# Patient Record
Sex: MALE | Race: WHITE | NOT HISPANIC OR LATINO | Employment: FULL TIME | URBAN - METROPOLITAN AREA
[De-identification: names, ages, dates, MRNs, and addresses within clinical notes are randomized per-mention and may not be internally consistent; named-entity substitution may affect disease eponyms.]

---

## 2017-11-18 ENCOUNTER — TRANSFERRED RECORDS (OUTPATIENT)
Dept: HEALTH INFORMATION MANAGEMENT | Facility: CLINIC | Age: 32
End: 2017-11-18

## 2019-10-02 ENCOUNTER — HEALTH MAINTENANCE LETTER (OUTPATIENT)
Age: 34
End: 2019-10-02

## 2021-01-15 ENCOUNTER — HEALTH MAINTENANCE LETTER (OUTPATIENT)
Age: 36
End: 2021-01-15

## 2021-07-07 ENCOUNTER — COMMUNICATION - HEALTHEAST (OUTPATIENT)
Dept: SCHEDULING | Facility: CLINIC | Age: 36
End: 2021-07-07

## 2021-07-11 PROBLEM — I16.0 HYPERTENSIVE URGENCY: Status: ACTIVE | Noted: 2021-07-06

## 2021-07-11 PROBLEM — I48.0 PAROXYSMAL A-FIB (H): Status: ACTIVE | Noted: 2021-07-08

## 2021-07-11 PROBLEM — I50.21 ACUTE SYSTOLIC CONGESTIVE HEART FAILURE (H): Status: ACTIVE | Noted: 2021-07-08

## 2021-07-11 PROBLEM — G47.33 OSA (OBSTRUCTIVE SLEEP APNEA): Status: ACTIVE | Noted: 2021-07-06

## 2021-07-11 PROBLEM — J81.0 ACUTE PULMONARY EDEMA (H): Status: ACTIVE | Noted: 2021-07-06

## 2021-07-11 PROBLEM — J96.01 ACUTE RESPIRATORY FAILURE WITH HYPOXIA (H): Status: ACTIVE | Noted: 2021-07-06

## 2021-07-13 ENCOUNTER — OFFICE VISIT (OUTPATIENT)
Dept: FAMILY MEDICINE | Facility: CLINIC | Age: 36
End: 2021-07-13
Payer: COMMERCIAL

## 2021-07-13 VITALS
WEIGHT: 298.6 LBS | OXYGEN SATURATION: 99 % | BODY MASS INDEX: 40.5 KG/M2 | SYSTOLIC BLOOD PRESSURE: 140 MMHG | HEART RATE: 79 BPM | DIASTOLIC BLOOD PRESSURE: 102 MMHG

## 2021-07-13 DIAGNOSIS — I10 BENIGN ESSENTIAL HYPERTENSION: ICD-10-CM

## 2021-07-13 DIAGNOSIS — G47.33 OSA (OBSTRUCTIVE SLEEP APNEA): ICD-10-CM

## 2021-07-13 DIAGNOSIS — E66.01 MORBID OBESITY (H): ICD-10-CM

## 2021-07-13 DIAGNOSIS — J81.0 ACUTE PULMONARY EDEMA (H): Primary | ICD-10-CM

## 2021-07-13 DIAGNOSIS — I50.21 ACUTE SYSTOLIC CONGESTIVE HEART FAILURE (H): ICD-10-CM

## 2021-07-13 PROBLEM — J96.01 ACUTE RESPIRATORY FAILURE WITH HYPOXIA (H): Status: RESOLVED | Noted: 2021-07-06 | Resolved: 2021-07-13

## 2021-07-13 PROBLEM — I16.0 HYPERTENSIVE URGENCY: Status: RESOLVED | Noted: 2021-07-06 | Resolved: 2021-07-13

## 2021-07-13 LAB
ANION GAP SERPL CALCULATED.3IONS-SCNC: 13 MMOL/L (ref 5–18)
BUN SERPL-MCNC: 23 MG/DL (ref 8–22)
CALCIUM SERPL-MCNC: 9.7 MG/DL (ref 8.5–10.5)
CHLORIDE BLD-SCNC: 103 MMOL/L (ref 98–107)
CO2 SERPL-SCNC: 22 MMOL/L (ref 22–31)
CREAT SERPL-MCNC: 1.14 MG/DL (ref 0.7–1.3)
GFR SERPL CREATININE-BSD FRML MDRD: 82 ML/MIN/1.73M2
GLUCOSE BLD-MCNC: 102 MG/DL (ref 70–125)
POTASSIUM BLD-SCNC: 4.4 MMOL/L (ref 3.5–5)
SODIUM SERPL-SCNC: 138 MMOL/L (ref 136–145)

## 2021-07-13 PROCEDURE — 99495 TRANSJ CARE MGMT MOD F2F 14D: CPT | Performed by: FAMILY MEDICINE

## 2021-07-13 PROCEDURE — 80048 BASIC METABOLIC PNL TOTAL CA: CPT | Performed by: FAMILY MEDICINE

## 2021-07-13 PROCEDURE — 36415 COLL VENOUS BLD VENIPUNCTURE: CPT | Performed by: FAMILY MEDICINE

## 2021-07-15 NOTE — PROGRESS NOTES
Hospital Follow-up Visit:    Hospital/Nursing Home/IP Rehab Facility: [facility]  Date of Admission: 7/6/2021  Date of Discharge: 7/8/2021  Reason(s) for Admission: pulmonary edema      Was your hospitalization related to COVID-19? No   Problems taking medications regularly:  None  Medication changes since discharge: None  Problems adhering to non-medication therapy:  None    Summary of hospitalization:  [facility] discharge summary reviewed  Diagnostic Tests/Treatments reviewed.  Follow up needed: none  Other Healthcare Providers Involved in Patient s Care:         None  Update since discharge: improved. Post Discharge Medication Reconciliation: discharge medications reconciled, continue medications without change.  Plan of care communicated with patient         Assessment & Plan     Acute pulmonary edema (H)    This is essentially resolved at this point.  He is breathing very normally and I hear no evidence of fluid in his lungs.  We will continue to observe him for now.  He continues on the furosemide 20 mg just as needed.  And is not really requiring any of that right now.    Acute systolic congestive heart failure (H)    I did do basic profile today.  He will continue the carvedilol at 37.5 mg twice a day as well as the losartan that he is using for this purpose and he can follow-up with a cardiologist as scheduled here soon.      - Basic metabolic panel    Benign essential hypertension    Again as above, with the medications that will also help with his blood pressure.  It is pretty high today even on recheck so we will need to keep an eye on that and the cardiology visit in the near future will also no doubt that take look at that number as well.    LEE ANN (obstructive sleep apnea)      Morbid obesity (H)    We briefly talked about weight today and has been struggling with that much of his adult life.  We talked briefly about trying to eat right with lower carbohydrate  intake and decreased fat and salt intake, as well as trying to exercise at some point every day if he can.      Review of external notes as documented elsewhere in note  Review of the result(s) of each unique test - labs  Ordering of each unique test  Prescription drug management  45 minutes spent on the date of the encounter doing chart review, review of outside records, review of test results, interpretation of tests, patient visit and documentation        BMI:   Estimated body mass index is 40.5 kg/m  as calculated from the following:    Height as of 7/6/21: 1.829 m (6').    Weight as of this encounter: 135.4 kg (298 lb 9.6 oz).   Weight management plan: Discussed healthy diet and exercise guidelines      Nathanael Boswell MD  [facility]    Gomez Simon is a 36 year old who presents for the following health issues     HPI     Patient is here today as a new patient who is following up from recent hospitalization.  He was seen in the hospital from July 6 to July 8 with pulmonary edema and heart failure.  He is obviously rather young for this but has had some issues with this in the past.  He had some atrial fibrillation before but this time he was not in atrial fibrillation but had an EF that was done around 30% and this affected his breathing and he went into the hospital and was found to be in heart failure and was diuresed and had his medications adjusted and he went home a couple of days later.  Is feeling much better at this point.  He is not really having any shortness of breath or chest pain or any trouble like that.  He is following up with our cardiology team here in the next couple of weeks.  He is here to get some blood checked today as well.    Patient is new patient to the clinic. Please see past medical history, surgical history, social history and family history, all of which were completed in their entirety today.   Review of Systems   Constitutional, HEENT,  cardiovascular, pulmonary, gi and gu systems are negative, except as otherwise noted.      Objective    BP (!) 140/102 (BP Location: Left arm, Patient Position: Sitting, Cuff Size: Adult Large)   Pulse 79   Wt 135.4 kg (298 lb 9.6 oz)   SpO2 99%   BMI 40.50 kg/m    Body mass index is 40.5 kg/m .  Physical Exam     Obese gentleman in no acute distress.  Vital signs as noted.  Blood pressure is still elevated.  Chest clear to auscultation today.  Heart regular rate and rhythm.  Extremities do not show any significant edema.    Results for orders placed or performed in visit on 07/13/21   Basic metabolic panel     Status: Abnormal   Result Value Ref Range    Sodium 138 136 - 145 mmol/L    Potassium 4.4 3.5 - 5.0 mmol/L    Chloride 103 98 - 107 mmol/L    Carbon Dioxide (CO2) 22 22 - 31 mmol/L    Anion Gap 13 5 - 18 mmol/L    Urea Nitrogen 23 (H) 8 - 22 mg/dL    Creatinine 1.14 0.70 - 1.30 mg/dL    Calcium 9.7 8.5 - 10.5 mg/dL    Glucose 102 70 - 125 mg/dL    GFR Estimate 82 >60 mL/min/1.73m2

## 2021-08-24 ENCOUNTER — OFFICE VISIT (OUTPATIENT)
Dept: CARDIOLOGY | Facility: CLINIC | Age: 36
End: 2021-08-24
Payer: COMMERCIAL

## 2021-08-24 VITALS
SYSTOLIC BLOOD PRESSURE: 150 MMHG | RESPIRATION RATE: 16 BRPM | BODY MASS INDEX: 41.09 KG/M2 | WEIGHT: 303 LBS | HEART RATE: 78 BPM | DIASTOLIC BLOOD PRESSURE: 102 MMHG

## 2021-08-24 DIAGNOSIS — I42.0 DILATED CARDIOMYOPATHY (H): ICD-10-CM

## 2021-08-24 DIAGNOSIS — I10 HYPERTENSION, UNCONTROLLED: Primary | ICD-10-CM

## 2021-08-24 DIAGNOSIS — I48.0 PAROXYSMAL A-FIB (H): ICD-10-CM

## 2021-08-24 DIAGNOSIS — G47.33 OSA (OBSTRUCTIVE SLEEP APNEA): ICD-10-CM

## 2021-08-24 DIAGNOSIS — I50.21 ACUTE SYSTOLIC CONGESTIVE HEART FAILURE (H): ICD-10-CM

## 2021-08-24 DIAGNOSIS — E66.01 MORBID OBESITY (H): ICD-10-CM

## 2021-08-24 PROCEDURE — 99204 OFFICE O/P NEW MOD 45 MIN: CPT | Performed by: INTERNAL MEDICINE

## 2021-08-24 RX ORDER — VALSARTAN 320 MG/1
320 TABLET ORAL DAILY
Qty: 10 TABLET | Refills: 1 | Status: SHIPPED | OUTPATIENT
Start: 2021-08-24 | End: 2021-09-14

## 2021-08-24 NOTE — PROGRESS NOTES
[facility] Heart Care Office Consult     Assessment:   (I10) Hypertension, uncontrolled  (primary encounter diagnosis)  Comment: Suspect his biggest issue, possibly from medication noncompliance.  Will check renal artery ultrasound since he has a history of renal dysfunction following ACE inhibitor administration.  If needed can do renal intervention.  We will switch losartan over to valsartan 320 mg for better efficacy.  If blood pressure not under better control would switch Toprol back over to carvedilol 37.5 mg twice a day.  Might need to add Aldactone 25 mg twice a day.    (G47.33) LEE ANN (obstructive sleep apnea)  Comment: Nightly CPAP.  Current weight loss.      (E66.01) Morbid obesity (H)  Comment: As above work on weight loss.      (I42.0) Dilated cardiomyopathy (H)  Comment: In past ejection fraction 25% with atrial fibrillation, most recently calculated 49 but estimated 40% with us, prior to that at [facility] was 60 to 65%.  Once blood pressure stabilizes we will arrange for repeat echo.    (I50.21) Acute systolic congestive heart failure (H)  Comment: No significant signs or symptoms currently but suggest furosemide as needed.  Might consider adding Aldactone in future.    (I48.0) Paroxysmal A-fib (H)  Comment: In sinus rhythm currently, had cardioversion some years ago, [id number] is at least 2, chronic Xarelto therapy.     Plan:   1.  Change losartan over to valsartan, 10-day prescription given, if he tolerates it we will get him a 90-day prescription.  2.  Renal artery ultrasound looking for renal artery stenosis and address if found.  3.  Consider the Raptor study for blood pressure management.  4.  Consider changing Toprol over to carvedilol.  5.  Follow-up with me in 3 months or sooner if needed.    History of Present Illness:   Thank you for asking the [facility] Heart Care team to see Alfred MOORE Abraham a 36 year old   male  in consultation  to evaluate heart failure and  cardiomyopathy.   Several years ago patient had a foot injury while at work and was noted have a mild cardiomyopathy with ejection fraction of 45 to 50%.  This was in 2009 and since then he is been hospitalized at least once around 2013 with atrial fibrillation and ejection fraction of 25%.  His ejection fraction was around 45 to 50% in around 2016 when he underwent coronary angiography and showing no structural lesions.  Just recently, he started CPAP, and has been feeling so well he discontinued his antihypertensive therapy.  He presented to the hospital on heart failure with pulmonary edema without any extremity edema.  He was appropriately diuresed and is here today to establish relationship with a cardiologist since he is now moved to [place].  He currently is getting along well. Patient complains of no syncope, dizziness, fatigue, fevers, chest pain, palpitations, shortness of breath, PND, orthopnea, nausea, vomiting, or edema.    Past Medical History:     Past Medical History:   Diagnosis Date     Acute respiratory failure with hypoxia (H) 7/6/2021     Atrial fibrillation (H) acceptable not valvular      Congestive heart failure (H) in the setting of mid range ejection fraction      Hypertension      Hypertensive urgency  Struct of sleep apnea  History of acute kidney injury 7/6/2021     Past history is negative for cancer, tuberculosis, diabetes mellitus, myocardial infarction,  rheumatic fever, cerebrovascular accident, chronic kidney disease, peptic ulcer disease, chronic obstructive pulmonary disease, or thyroid disorder  and no lipid disorder.    Past Surgical History:     Past Surgical History:   Procedure Laterality Date     ORTHOPEDIC SURGERY  2011    L foot metatarsal     Family History:   Family history positive hypertension in his father otherwise negative for coronary artery disease.    Social History:   He is , lives at home independently with his wife and 2 daughters, works as a   for [employer], reports that he has never smoked. He has never used smokeless tobacco. He reports previous alcohol use. He reports that he does not use drugs. The primary care physician is Nathanael Boswell    Meds:   Scheduled Meds:  Current Outpatient Medications   Medication Sig Dispense Refill     amoxicillin-clavulanate (AUGMENTIN) 875-125 MG per tablet Take 1 tablet by mouth daily       aspirin 81 mg chewable tablet [ASPIRIN 81 MG CHEWABLE TABLET] Chew 81 mg daily.       docosahexaenoic acid-epa 120-180 mg cap [DOCOSAHEXAENOIC ACID--180 MG CAP] Take 2 g by mouth daily.       furosemide (LASIX) 20 MG tablet [FUROSEMIDE (LASIX) 20 MG TABLET] Take 1 tablet (20 mg total) by mouth daily as needed (for shortness of breath or weight gain of 2 lbs or greater). 30 tablet 0     losartan (COZAAR) 100 MG tablet [LOSARTAN (COZAAR) 100 MG TABLET] Take 1 tablet (100 mg total) by mouth daily. 30 tablet 0     LOSARTAN POTASSIUM PO Take 50 mg by mouth daily       METOPROLOL TARTRATE PO Take 100 mg by mouth daily       multivitamin (MEN'S MULTI-VITAMIN) per tablet [MULTIVITAMIN (MEN'S MULTI-VITAMIN) PER TABLET] Take 1 tablet by mouth daily.       Rivaroxaban (XARELTO PO) Take 20 mg by mouth daily       rivaroxaban ANTICOAGULANT (XARELTO) 20 mg tablet [RIVAROXABAN ANTICOAGULANT (XARELTO) 20 MG TABLET] Take 1 tablet (20 mg total) by mouth daily. 30 tablet 0     valsartan (DIOVAN) 320 MG tablet Take 1 tablet (320 mg) by mouth daily 10 tablet 1     carvediloL (COREG) 25 MG tablet [CARVEDILOL (COREG) 25 MG TABLET] Take 1.5 tablets (37.5 mg total) by mouth 2 (two) times a day with meals. 90 tablet 0       PRN Meds:.    Allergies:   Patient has no known allergies.    Objective:      Physical Exam  137.4 kg (303 lb)     Body mass index is 41.09 kg/m .  BP (!) 150/102 (BP Location: Left arm, Patient Position: Sitting, Cuff Size: Adult Large)   Pulse 78   Resp 16   Wt 137.4 kg (303 lb)   BMI 41.09 kg/m      General Appearance:    Alert, cooperative and in no acute distress.   HEENT:  No scleral icterus; the mucous membranes were pink and moist.   Neck: JVP normal. No thyromegaly. No HJR   Chest: The spine was straight. The chest was symmetric.   Lungs:   Respirations unlabored; the lungs are clear to auscultation.   Cardiovascular:   S1 and S2 without murmur, clicks or rubs. Brachial, radial, carotid and posterior tibial pulses are intact and symetrical.  No carotid bruits noted   Abdomen:  No organomegaly, masses, bruits, or tenderness. Bowels sounds are present   Extremities: No cyanosis, clubbing, or edema.   Skin: No xanthelasma.   Neurologic: Mood and affect are appropriate.         Lab Reviewed Personally by myself  Lab Results   Component Value Date     07/13/2021     03/27/2012    CO2 22 07/13/2021    CO2 29 03/27/2012    BUN 23 07/13/2021    BUN 18.9 03/27/2012    BUN 19 03/27/2012     Lab Results   Component Value Date    WBC 11.7 07/07/2021    WBC 10.8 02/09/2011    HGB 15.6 07/07/2021    HGB 13.5 02/09/2011    HCT 45.4 07/07/2021    HCT 39.7 02/09/2011    MCV 86 07/07/2021    MCV 93 02/09/2011     07/07/2021     02/09/2011     Lab Results   Component Value Date    CHOL 191 02/09/2011    TRIG 162 03/27/2012    HDL 43 03/27/2012     Lab Results   Component Value Date     07/06/2021       ECG personally reviewed by myself shows this rhythm, leftward axis, left ventricular hypertrophy with repolarization changes.     Review of Systems:     Review of Systems:   Enc Vitals  BP: (!) 150/102  Pulse: 78  Resp: 16  Weight: 137.4 kg (303 lb)

## 2021-08-24 NOTE — PATIENT INSTRUCTIONS
Alfred OSCAR Rosario,  It certainly was nice to meet you today.  Per our conversation you having high blood pressures which leads to heart failure and abnormal heartbeat and the reason I am checking the ultrasound of the kidney arteries heart.  Try the VALSARTAN in place of the LOSARTAN and if no issues call [phone] to get 90 day supply.  We will call you the results of these tests.   I will plan on seeing you in 1-2 months or sooner if need be.  Harinder Fisher

## 2021-08-24 NOTE — LETTER
8/24/2021    Nathanael Boswell MD  [address] [facility] [address]    RE: Alfred Rosario       Dear Colleague,    I had the pleasure of seeing Alfred S Abraham in the [facility] Heart Care.      [facility] Heart Care Office Consult     Assessment:   (I10) Hypertension, uncontrolled  (primary encounter diagnosis)  Comment: Suspect his biggest issue, possibly from medication noncompliance.  Will check renal artery ultrasound since he has a history of renal dysfunction following ACE inhibitor administration.  If needed can do renal intervention.  We will switch losartan over to valsartan 320 mg for better efficacy.  If blood pressure not under better control would switch Toprol back over to carvedilol 37.5 mg twice a day.  Might need to add Aldactone 25 mg twice a day.    (G47.33) LEE ANN (obstructive sleep apnea)  Comment: Nightly CPAP.  Current weight loss.      (E66.01) Morbid obesity (H)  Comment: As above work on weight loss.      (I42.0) Dilated cardiomyopathy (H)  Comment: In past ejection fraction 25% with atrial fibrillation, most recently calculated 49 but estimated 40% with us, prior to that at [facility] was 60 to 65%.  Once blood pressure stabilizes we will arrange for repeat echo.    (I50.21) Acute systolic congestive heart failure (H)  Comment: No significant signs or symptoms currently but suggest furosemide as needed.  Might consider adding Aldactone in future.    (I48.0) Paroxysmal A-fib (H)  Comment: In sinus rhythm currently, had cardioversion some years ago, [id number] is at least 2, chronic Xarelto therapy.     Plan:   1.  Change losartan over to valsartan, 10-day prescription given, if he tolerates it we will get him a 90-day prescription.  2.  Renal artery ultrasound looking for renal artery stenosis and address if found.  3.  Consider the Raptor study for blood pressure management.  4.  Consider changing Toprol over to  carvedilol.  5.  Follow-up with me in 3 months or sooner if needed.    History of Present Illness:   Thank you for asking the [facility] Heart Care team to see Alfred Rosario a 36 year old   male  in consultation  to evaluate heart failure and cardiomyopathy.   Several years ago patient had a foot injury while at work and was noted have a mild cardiomyopathy with ejection fraction of 45 to 50%.  This was in 2009 and since then he is been hospitalized at least once around 2013 with atrial fibrillation and ejection fraction of 25%.  His ejection fraction was around 45 to 50% in around 2016 when he underwent coronary angiography and showing no structural lesions.  Just recently, he started CPAP, and has been feeling so well he discontinued his antihypertensive therapy.  He presented to the hospital on heart failure with pulmonary edema without any extremity edema.  He was appropriately diuresed and is here today to establish relationship with a cardiologist since he is now moved to [place].  He currently is getting along well. Patient complains of no syncope, dizziness, fatigue, fevers, chest pain, palpitations, shortness of breath, PND, orthopnea, nausea, vomiting, or edema.    Past Medical History:     Past Medical History:   Diagnosis Date     Acute respiratory failure with hypoxia (H) 7/6/2021     Atrial fibrillation (H) acceptable not valvular      Congestive heart failure (H) in the setting of mid range ejection fraction      Hypertension      Hypertensive urgency  Struct of sleep apnea  History of acute kidney injury 7/6/2021     Past history is negative for cancer, tuberculosis, diabetes mellitus, myocardial infarction,  rheumatic fever, cerebrovascular accident, chronic kidney disease, peptic ulcer disease, chronic obstructive pulmonary disease, or thyroid disorder  and no lipid disorder.    Past Surgical History:     Past Surgical History:   Procedure Laterality Date     ORTHOPEDIC SURGERY  2011    L  foot metatarsal     Family History:   Family history positive hypertension in his father otherwise negative for coronary artery disease.    Social History:   He is , lives at home independently with his wife and 2 daughters, works as a  for [employer], reports that he has never smoked. He has never used smokeless tobacco. He reports previous alcohol use. He reports that he does not use drugs. The primary care physician is Nathanael Boswell    Meds:   Scheduled Meds:  Current Outpatient Medications   Medication Sig Dispense Refill     amoxicillin-clavulanate (AUGMENTIN) 875-125 MG per tablet Take 1 tablet by mouth daily       aspirin 81 mg chewable tablet [ASPIRIN 81 MG CHEWABLE TABLET] Chew 81 mg daily.       docosahexaenoic acid-epa 120-180 mg cap [DOCOSAHEXAENOIC ACID--180 MG CAP] Take 2 g by mouth daily.       furosemide (LASIX) 20 MG tablet [FUROSEMIDE (LASIX) 20 MG TABLET] Take 1 tablet (20 mg total) by mouth daily as needed (for shortness of breath or weight gain of 2 lbs or greater). 30 tablet 0     losartan (COZAAR) 100 MG tablet [LOSARTAN (COZAAR) 100 MG TABLET] Take 1 tablet (100 mg total) by mouth daily. 30 tablet 0     LOSARTAN POTASSIUM PO Take 50 mg by mouth daily       METOPROLOL TARTRATE PO Take 100 mg by mouth daily       multivitamin (MEN'S MULTI-VITAMIN) per tablet [MULTIVITAMIN (MEN'S MULTI-VITAMIN) PER TABLET] Take 1 tablet by mouth daily.       Rivaroxaban (XARELTO PO) Take 20 mg by mouth daily       rivaroxaban ANTICOAGULANT (XARELTO) 20 mg tablet [RIVAROXABAN ANTICOAGULANT (XARELTO) 20 MG TABLET] Take 1 tablet (20 mg total) by mouth daily. 30 tablet 0     valsartan (DIOVAN) 320 MG tablet Take 1 tablet (320 mg) by mouth daily 10 tablet 1     carvediloL (COREG) 25 MG tablet [CARVEDILOL (COREG) 25 MG TABLET] Take 1.5 tablets (37.5 mg total) by mouth 2 (two) times a day with meals. 90 tablet 0       PRN Meds:.    Allergies:   Patient has no known allergies.    Objective:       Physical Exam  137.4 kg (303 lb)     Body mass index is 41.09 kg/m .  BP (!) 150/102 (BP Location: Left arm, Patient Position: Sitting, Cuff Size: Adult Large)   Pulse 78   Resp 16   Wt 137.4 kg (303 lb)   BMI 41.09 kg/m      General Appearance:   Alert, cooperative and in no acute distress.   HEENT:  No scleral icterus; the mucous membranes were pink and moist.   Neck: JVP normal. No thyromegaly. No HJR   Chest: The spine was straight. The chest was symmetric.   Lungs:   Respirations unlabored; the lungs are clear to auscultation.   Cardiovascular:   S1 and S2 without murmur, clicks or rubs. Brachial, radial, carotid and posterior tibial pulses are intact and symetrical.  No carotid bruits noted   Abdomen:  No organomegaly, masses, bruits, or tenderness. Bowels sounds are present   Extremities: No cyanosis, clubbing, or edema.   Skin: No xanthelasma.   Neurologic: Mood and affect are appropriate.         Lab Reviewed Personally by myself  Lab Results   Component Value Date     07/13/2021     03/27/2012    CO2 22 07/13/2021    CO2 29 03/27/2012    BUN 23 07/13/2021    BUN 18.9 03/27/2012    BUN 19 03/27/2012     Lab Results   Component Value Date    WBC 11.7 07/07/2021    WBC 10.8 02/09/2011    HGB 15.6 07/07/2021    HGB 13.5 02/09/2011    HCT 45.4 07/07/2021    HCT 39.7 02/09/2011    MCV 86 07/07/2021    MCV 93 02/09/2011     07/07/2021     02/09/2011     Lab Results   Component Value Date    CHOL 191 02/09/2011    TRIG 162 03/27/2012    HDL 43 03/27/2012     Lab Results   Component Value Date     07/06/2021       ECG personally reviewed by myself shows this rhythm, leftward axis, left ventricular hypertrophy with repolarization changes.     Review of Systems:     Review of Systems:   Enc Vitals  BP: (!) 150/102  Pulse: 78  Resp: 16  Weight: 137.4 kg (303 lb)                                              Thank you for allowing me to participate in the care of your  patient.      Sincerely,     OLLIE WONG MD     [facility]  cc:   No referring provider defined for this encounter.

## 2021-09-04 ENCOUNTER — HEALTH MAINTENANCE LETTER (OUTPATIENT)
Age: 36
End: 2021-09-04

## 2021-09-14 DIAGNOSIS — I16.0 HYPERTENSIVE URGENCY: ICD-10-CM

## 2021-09-14 DIAGNOSIS — I50.21 ACUTE SYSTOLIC CONGESTIVE HEART FAILURE (H): ICD-10-CM

## 2021-09-14 DIAGNOSIS — I48.0 PAROXYSMAL A-FIB (H): Primary | ICD-10-CM

## 2021-09-14 DIAGNOSIS — I10 HYPERTENSION, UNCONTROLLED: ICD-10-CM

## 2021-09-14 RX ORDER — VALSARTAN 320 MG/1
320 TABLET ORAL DAILY
Qty: 90 TABLET | Refills: 1 | Status: SHIPPED | OUTPATIENT
Start: 2021-09-14 | End: 2022-02-28

## 2021-09-14 RX ORDER — CARVEDILOL 25 MG/1
37.5 TABLET ORAL 2 TIMES DAILY WITH MEALS
Qty: 270 TABLET | Refills: 1 | Status: SHIPPED | OUTPATIENT
Start: 2021-09-14 | End: 2022-02-28

## 2021-09-14 RX ORDER — FUROSEMIDE 20 MG
20 TABLET ORAL DAILY PRN
Qty: 30 TABLET | Refills: 0 | Status: SHIPPED | OUTPATIENT
Start: 2021-09-14 | End: 2021-10-07

## 2021-10-07 DIAGNOSIS — I50.21 ACUTE SYSTOLIC CONGESTIVE HEART FAILURE (H): ICD-10-CM

## 2021-10-07 RX ORDER — FUROSEMIDE 20 MG
20 TABLET ORAL DAILY PRN
Qty: 30 TABLET | Refills: 0 | Status: SHIPPED | OUTPATIENT
Start: 2021-10-07 | End: 2021-11-01

## 2021-11-01 DIAGNOSIS — I50.21 ACUTE SYSTOLIC CONGESTIVE HEART FAILURE (H): ICD-10-CM

## 2021-11-01 RX ORDER — FUROSEMIDE 20 MG
20 TABLET ORAL DAILY PRN
Qty: 30 TABLET | Refills: 0 | Status: SHIPPED | OUTPATIENT
Start: 2021-11-01 | End: 2021-11-24

## 2022-02-19 ENCOUNTER — HEALTH MAINTENANCE LETTER (OUTPATIENT)
Age: 37
End: 2022-02-19

## 2022-05-03 ENCOUNTER — HOSPITAL ENCOUNTER (INPATIENT)
Facility: CLINIC | Age: 37
LOS: 3 days | Discharge: HOME | DRG: 871 | End: 2022-05-06
Attending: EMERGENCY MEDICINE | Admitting: INTERNAL MEDICINE
Payer: COMMERCIAL

## 2022-05-03 ENCOUNTER — APPOINTMENT (OUTPATIENT)
Dept: CT IMAGING | Facility: CLINIC | Age: 37
DRG: 871 | End: 2022-05-03
Attending: EMERGENCY MEDICINE
Payer: COMMERCIAL

## 2022-05-03 ENCOUNTER — TRANSFERRED RECORDS (OUTPATIENT)
Dept: CT IMAGING | Facility: HOSPITAL | Age: 37
End: 2022-05-03

## 2022-05-03 ENCOUNTER — APPOINTMENT (OUTPATIENT)
Dept: RADIOLOGY | Facility: CLINIC | Age: 37
DRG: 871 | End: 2022-05-03
Attending: EMERGENCY MEDICINE
Payer: COMMERCIAL

## 2022-05-03 DIAGNOSIS — R50.9 FEVER, UNSPECIFIED FEVER CAUSE: ICD-10-CM

## 2022-05-03 DIAGNOSIS — J15.9 COMMUNITY ACQUIRED BACTERIAL PNEUMONIA: ICD-10-CM

## 2022-05-03 DIAGNOSIS — R78.81 GRAM-NEGATIVE BACTEREMIA: ICD-10-CM

## 2022-05-03 DIAGNOSIS — I48.0 PAROXYSMAL A-FIB (H): Primary | ICD-10-CM

## 2022-05-03 DIAGNOSIS — A41.9 SEPSIS, DUE TO UNSPECIFIED ORGANISM, UNSPECIFIED WHETHER ACUTE ORGAN DYSFUNCTION PRESENT (H): ICD-10-CM

## 2022-05-03 DIAGNOSIS — R05.9 COUGH: ICD-10-CM

## 2022-05-03 LAB
ANION GAP SERPL CALCULATED.3IONS-SCNC: 15 MMOL/L (ref 5–18)
BNP SERPL-MCNC: 45 PG/ML (ref 0–35)
BUN SERPL-MCNC: 18 MG/DL (ref 8–22)
CALCIUM SERPL-MCNC: 9.6 MG/DL (ref 8.5–10.5)
CHLORIDE BLD-SCNC: 103 MMOL/L (ref 98–107)
CO2 SERPL-SCNC: 21 MMOL/L (ref 22–31)
CREAT SERPL-MCNC: 1.15 MG/DL (ref 0.7–1.3)
ERYTHROCYTE [DISTWIDTH] IN BLOOD BY AUTOMATED COUNT: 13.7 % (ref 10–15)
FLUAV RNA SPEC QL NAA+PROBE: NEGATIVE
FLUBV RNA RESP QL NAA+PROBE: NEGATIVE
GFR SERPL CREATININE-BSD FRML MDRD: 84 ML/MIN/1.73M2
GLUCOSE BLD-MCNC: 109 MG/DL (ref 70–125)
HCT VFR BLD AUTO: 44.4 % (ref 40–53)
HGB BLD-MCNC: 15 G/DL (ref 13.3–17.7)
HOLD SPECIMEN: NORMAL
HOLD SPECIMEN: NORMAL
LACTATE SERPL-SCNC: 2.3 MMOL/L (ref 0.7–2)
MCH RBC QN AUTO: 29.4 PG (ref 26.5–33)
MCHC RBC AUTO-ENTMCNC: 33.8 G/DL (ref 31.5–36.5)
MCV RBC AUTO: 87 FL (ref 78–100)
PLATELET # BLD AUTO: 301 10E3/UL (ref 150–450)
POTASSIUM BLD-SCNC: 3.7 MMOL/L (ref 3.5–5)
RBC # BLD AUTO: 5.1 10E6/UL (ref 4.4–5.9)
SARS-COV-2 RNA RESP QL NAA+PROBE: NEGATIVE
SODIUM SERPL-SCNC: 139 MMOL/L (ref 136–145)
TROPONIN I SERPL-MCNC: 0.01 NG/ML (ref 0–0.29)
WBC # BLD AUTO: 26.7 10E3/UL (ref 4–11)

## 2022-05-03 PROCEDURE — 258N000003 HC RX IP 258 OP 636: Performed by: EMERGENCY MEDICINE

## 2022-05-03 PROCEDURE — 87486 CHLMYD PNEUM DNA AMP PROBE: CPT | Performed by: INTERNAL MEDICINE

## 2022-05-03 PROCEDURE — 96367 TX/PROPH/DG ADDL SEQ IV INF: CPT

## 2022-05-03 PROCEDURE — 99223 1ST HOSP IP/OBS HIGH 75: CPT | Mod: AI | Performed by: INTERNAL MEDICINE

## 2022-05-03 PROCEDURE — 84145 PROCALCITONIN (PCT): CPT | Performed by: EMERGENCY MEDICINE

## 2022-05-03 PROCEDURE — 250N000011 HC RX IP 250 OP 636: Performed by: EMERGENCY MEDICINE

## 2022-05-03 PROCEDURE — 99291 CRITICAL CARE FIRST HOUR: CPT

## 2022-05-03 PROCEDURE — 96365 THER/PROPH/DIAG IV INF INIT: CPT | Mod: 59

## 2022-05-03 PROCEDURE — 83880 ASSAY OF NATRIURETIC PEPTIDE: CPT | Performed by: EMERGENCY MEDICINE

## 2022-05-03 PROCEDURE — C9803 HOPD COVID-19 SPEC COLLECT: HCPCS

## 2022-05-03 PROCEDURE — 120N000001 HC R&B MED SURG/OB

## 2022-05-03 PROCEDURE — 36415 COLL VENOUS BLD VENIPUNCTURE: CPT | Performed by: EMERGENCY MEDICINE

## 2022-05-03 PROCEDURE — 87633 RESP VIRUS 12-25 TARGETS: CPT | Performed by: INTERNAL MEDICINE

## 2022-05-03 PROCEDURE — 80048 BASIC METABOLIC PNL TOTAL CA: CPT | Performed by: EMERGENCY MEDICINE

## 2022-05-03 PROCEDURE — 93005 ELECTROCARDIOGRAM TRACING: CPT | Performed by: EMERGENCY MEDICINE

## 2022-05-03 PROCEDURE — 71046 X-RAY EXAM CHEST 2 VIEWS: CPT

## 2022-05-03 PROCEDURE — 83605 ASSAY OF LACTIC ACID: CPT | Performed by: EMERGENCY MEDICINE

## 2022-05-03 PROCEDURE — 74177 CT ABD & PELVIS W/CONTRAST: CPT

## 2022-05-03 PROCEDURE — 87077 CULTURE AEROBIC IDENTIFY: CPT | Performed by: EMERGENCY MEDICINE

## 2022-05-03 PROCEDURE — 83735 ASSAY OF MAGNESIUM: CPT | Performed by: INTERNAL MEDICINE

## 2022-05-03 PROCEDURE — 87636 SARSCOV2 & INF A&B AMP PRB: CPT | Performed by: EMERGENCY MEDICINE

## 2022-05-03 PROCEDURE — 87149 DNA/RNA DIRECT PROBE: CPT | Performed by: EMERGENCY MEDICINE

## 2022-05-03 PROCEDURE — 84484 ASSAY OF TROPONIN QUANT: CPT | Performed by: EMERGENCY MEDICINE

## 2022-05-03 PROCEDURE — 85027 COMPLETE CBC AUTOMATED: CPT | Performed by: EMERGENCY MEDICINE

## 2022-05-03 PROCEDURE — 96366 THER/PROPH/DIAG IV INF ADDON: CPT

## 2022-05-03 PROCEDURE — 250N000013 HC RX MED GY IP 250 OP 250 PS 637: Performed by: EMERGENCY MEDICINE

## 2022-05-03 RX ORDER — BENZONATATE 100 MG/1
100 CAPSULE ORAL ONCE
Status: COMPLETED | OUTPATIENT
Start: 2022-05-03 | End: 2022-05-03

## 2022-05-03 RX ORDER — PIPERACILLIN SODIUM, TAZOBACTAM SODIUM 3; .375 G/15ML; G/15ML
3.38 INJECTION, POWDER, LYOPHILIZED, FOR SOLUTION INTRAVENOUS ONCE
Status: COMPLETED | OUTPATIENT
Start: 2022-05-03 | End: 2022-05-03

## 2022-05-03 RX ORDER — IOPAMIDOL 755 MG/ML
100 INJECTION, SOLUTION INTRAVASCULAR ONCE
Status: COMPLETED | OUTPATIENT
Start: 2022-05-03 | End: 2022-05-03

## 2022-05-03 RX ORDER — ACETAMINOPHEN 325 MG/1
975 TABLET ORAL ONCE
Status: COMPLETED | OUTPATIENT
Start: 2022-05-03 | End: 2022-05-03

## 2022-05-03 RX ADMIN — PIPERACILLIN AND TAZOBACTAM 3.38 G: 3; .375 INJECTION, POWDER, LYOPHILIZED, FOR SOLUTION INTRAVENOUS at 23:04

## 2022-05-03 RX ADMIN — IOPAMIDOL 100 ML: 755 INJECTION, SOLUTION INTRAVENOUS at 22:21

## 2022-05-03 RX ADMIN — SODIUM CHLORIDE 500 ML: 9 INJECTION, SOLUTION INTRAVENOUS at 21:13

## 2022-05-03 RX ADMIN — BENZONATATE 100 MG: 100 CAPSULE ORAL at 21:12

## 2022-05-03 RX ADMIN — VANCOMYCIN HYDROCHLORIDE 2500 MG: 5 INJECTION, POWDER, LYOPHILIZED, FOR SOLUTION INTRAVENOUS at 23:42

## 2022-05-03 RX ADMIN — Medication 1 LOZENGE: at 22:32

## 2022-05-03 RX ADMIN — ACETAMINOPHEN 975 MG: 325 TABLET ORAL at 20:37

## 2022-05-03 ASSESSMENT — ENCOUNTER SYMPTOMS
COUGH: 1
HEMATURIA: 0
NAUSEA: 0
VOMITING: 0
FEVER: 1
FATIGUE: 1
SHORTNESS OF BREATH: 1
DYSURIA: 0
FREQUENCY: 0
ABDOMINAL PAIN: 0
MYALGIAS: 1
DIARRHEA: 0

## 2022-05-03 NOTE — LETTER
[address]  [address] [place] [address]  Phone: [phone]  Fax: [phone]    May 6, 2022        Alfred Rosario  [facility] [facility] [address]          To whom it may concern:    RE: Alfred Rosario was hospitalized from 5/3/22 through 5/6/22.   He may return to work on 5/16/22 Monday.    Please contact me for questions or concerns.      Sincerely,      Joni Newman MD  Internal Medicine  Hospitalist  [facility]  Phone: #[phone]  .

## 2022-05-04 ENCOUNTER — APPOINTMENT (OUTPATIENT)
Dept: CT IMAGING | Facility: CLINIC | Age: 37
DRG: 871 | End: 2022-05-04
Attending: INTERNAL MEDICINE
Payer: COMMERCIAL

## 2022-05-04 ENCOUNTER — APPOINTMENT (OUTPATIENT)
Dept: CARDIOLOGY | Facility: CLINIC | Age: 37
DRG: 871 | End: 2022-05-04
Attending: INTERNAL MEDICINE
Payer: COMMERCIAL

## 2022-05-04 ENCOUNTER — APPOINTMENT (OUTPATIENT)
Dept: ULTRASOUND IMAGING | Facility: CLINIC | Age: 37
DRG: 871 | End: 2022-05-04
Attending: EMERGENCY MEDICINE
Payer: COMMERCIAL

## 2022-05-04 LAB
ALBUMIN SERPL-MCNC: 3.5 G/DL (ref 3.5–5)
ALBUMIN UR-MCNC: 20 MG/DL
ALP SERPL-CCNC: 53 U/L (ref 45–120)
ALT SERPL W P-5'-P-CCNC: 23 U/L (ref 0–45)
ANION GAP SERPL CALCULATED.3IONS-SCNC: 13 MMOL/L (ref 5–18)
APPEARANCE UR: CLEAR
AST SERPL W P-5'-P-CCNC: 21 U/L (ref 0–40)
ATRIAL RATE - MUSE: 120 BPM
BACTERIA SPT CULT: NORMAL
BACTERIA SPT CULT: NORMAL
BASOPHILS # BLD AUTO: 0.1 10E3/UL (ref 0–0.2)
BASOPHILS NFR BLD AUTO: 0 %
BILIRUB DIRECT SERPL-MCNC: 0.3 MG/DL
BILIRUB SERPL-MCNC: 1 MG/DL (ref 0–1)
BILIRUB UR QL STRIP: NEGATIVE
BUN SERPL-MCNC: 21 MG/DL (ref 8–22)
C PNEUM DNA SPEC QL NAA+PROBE: NOT DETECTED
CALCIUM SERPL-MCNC: 8.8 MG/DL (ref 8.5–10.5)
CHLORIDE BLD-SCNC: 104 MMOL/L (ref 98–107)
CO2 SERPL-SCNC: 18 MMOL/L (ref 22–31)
COLOR UR AUTO: ABNORMAL
CREAT SERPL-MCNC: 1.54 MG/DL (ref 0.7–1.3)
DIASTOLIC BLOOD PRESSURE - MUSE: NORMAL MMHG
EOSINOPHIL # BLD AUTO: 0 10E3/UL (ref 0–0.7)
EOSINOPHIL NFR BLD AUTO: 0 %
ERYTHROCYTE [DISTWIDTH] IN BLOOD BY AUTOMATED COUNT: 14.2 % (ref 10–15)
FLUAV H1 2009 PAND RNA SPEC QL NAA+PROBE: NOT DETECTED
FLUAV H1 RNA SPEC QL NAA+PROBE: NOT DETECTED
FLUAV H3 RNA SPEC QL NAA+PROBE: NOT DETECTED
FLUAV RNA SPEC QL NAA+PROBE: NOT DETECTED
FLUBV RNA SPEC QL NAA+PROBE: NOT DETECTED
GFR SERPL CREATININE-BSD FRML MDRD: 59 ML/MIN/1.73M2
GLUCOSE BLD-MCNC: 108 MG/DL (ref 70–125)
GLUCOSE UR STRIP-MCNC: NEGATIVE MG/DL
GRAM STAIN RESULT: NORMAL
HADV DNA SPEC QL NAA+PROBE: NOT DETECTED
HCOV PNL SPEC NAA+PROBE: NOT DETECTED
HCT VFR BLD AUTO: 44.3 % (ref 40–53)
HGB BLD-MCNC: 14.5 G/DL (ref 13.3–17.7)
HGB UR QL STRIP: NEGATIVE
HMPV RNA SPEC QL NAA+PROBE: NOT DETECTED
HPIV1 RNA SPEC QL NAA+PROBE: NOT DETECTED
HPIV2 RNA SPEC QL NAA+PROBE: NOT DETECTED
HPIV3 RNA SPEC QL NAA+PROBE: NOT DETECTED
HPIV4 RNA SPEC QL NAA+PROBE: NOT DETECTED
IMM GRANULOCYTES # BLD: 1.4 10E3/UL
IMM GRANULOCYTES NFR BLD: 4 %
INTERPRETATION ECG - MUSE: NORMAL
KETONES UR STRIP-MCNC: NEGATIVE MG/DL
L PNEUMO1 AG UR QL IA: NEGATIVE
LACTATE SERPL-SCNC: 1.5 MMOL/L (ref 0.7–2)
LACTATE SERPL-SCNC: 2.2 MMOL/L (ref 0.7–2)
LEUKOCYTE ESTERASE UR QL STRIP: NEGATIVE
LVEF ECHO: NORMAL
LYMPHOCYTES # BLD AUTO: 1.6 10E3/UL (ref 0.8–5.3)
LYMPHOCYTES NFR BLD AUTO: 5 %
M PNEUMO DNA SPEC QL NAA+PROBE: NOT DETECTED
MAGNESIUM SERPL-MCNC: 1.6 MG/DL (ref 1.8–2.6)
MAGNESIUM SERPL-MCNC: 2 MG/DL (ref 1.8–2.6)
MAGNESIUM SERPL-MCNC: 2 MG/DL (ref 1.8–2.6)
MCH RBC QN AUTO: 29.7 PG (ref 26.5–33)
MCHC RBC AUTO-ENTMCNC: 32.7 G/DL (ref 31.5–36.5)
MCV RBC AUTO: 91 FL (ref 78–100)
MONOCYTES # BLD AUTO: 1.9 10E3/UL (ref 0–1.3)
MONOCYTES NFR BLD AUTO: 6 %
MRSA DNA SPEC QL NAA+PROBE: NEGATIVE
NEUTROPHILS # BLD AUTO: 29.7 10E3/UL (ref 1.6–8.3)
NEUTROPHILS NFR BLD AUTO: 85 %
NITRATE UR QL: NEGATIVE
NRBC # BLD AUTO: 0 10E3/UL
NRBC BLD AUTO-RTO: 0 /100
P AXIS - MUSE: 64 DEGREES
PH UR STRIP: 5.5 [PH] (ref 5–7)
PLATELET # BLD AUTO: 257 10E3/UL (ref 150–450)
POTASSIUM BLD-SCNC: 4.5 MMOL/L (ref 3.5–5)
PR INTERVAL - MUSE: 160 MS
PROCALCITONIN SERPL-MCNC: 13.69 NG/ML (ref 0–0.49)
PROCALCITONIN SERPL-MCNC: 28 NG/ML (ref 0–0.49)
PROT SERPL-MCNC: 6.2 G/DL (ref 6–8)
QRS DURATION - MUSE: 72 MS
QT - MUSE: 288 MS
QTC - MUSE: 407 MS
R AXIS - MUSE: 16 DEGREES
RBC # BLD AUTO: 4.88 10E6/UL (ref 4.4–5.9)
RBC URINE: 2 /HPF
RSV RNA SPEC QL NAA+PROBE: NOT DETECTED
RSV RNA SPEC QL NAA+PROBE: NOT DETECTED
RV+EV RNA SPEC QL NAA+PROBE: DETECTED
S PNEUM AG SPEC QL: NEGATIVE
SA TARGET DNA: NEGATIVE
SODIUM SERPL-SCNC: 135 MMOL/L (ref 136–145)
SP GR UR STRIP: >1.05 (ref 1–1.03)
SYSTOLIC BLOOD PRESSURE - MUSE: NORMAL MMHG
T AXIS - MUSE: 69 DEGREES
TROPONIN I SERPL-MCNC: 0.02 NG/ML (ref 0–0.29)
UROBILINOGEN UR STRIP-MCNC: <2 MG/DL
VENTRICULAR RATE- MUSE: 120 BPM
WBC # BLD AUTO: 34.8 10E3/UL (ref 4–11)
WBC URINE: 3 /HPF

## 2022-05-04 PROCEDURE — 85025 COMPLETE CBC W/AUTO DIFF WBC: CPT | Performed by: INTERNAL MEDICINE

## 2022-05-04 PROCEDURE — 81001 URINALYSIS AUTO W/SCOPE: CPT | Performed by: INTERNAL MEDICINE

## 2022-05-04 PROCEDURE — 999N000208 ECHOCARDIOGRAM COMPLETE

## 2022-05-04 PROCEDURE — 250N000013 HC RX MED GY IP 250 OP 250 PS 637: Performed by: FAMILY MEDICINE

## 2022-05-04 PROCEDURE — 96375 TX/PRO/DX INJ NEW DRUG ADDON: CPT

## 2022-05-04 PROCEDURE — 80053 COMPREHEN METABOLIC PANEL: CPT | Performed by: INTERNAL MEDICINE

## 2022-05-04 PROCEDURE — 87070 CULTURE OTHR SPECIMN AEROBIC: CPT | Performed by: INTERNAL MEDICINE

## 2022-05-04 PROCEDURE — 36415 COLL VENOUS BLD VENIPUNCTURE: CPT | Performed by: INTERNAL MEDICINE

## 2022-05-04 PROCEDURE — 83605 ASSAY OF LACTIC ACID: CPT | Performed by: FAMILY MEDICINE

## 2022-05-04 PROCEDURE — 87205 SMEAR GRAM STAIN: CPT | Performed by: INTERNAL MEDICINE

## 2022-05-04 PROCEDURE — 84145 PROCALCITONIN (PCT): CPT | Performed by: INTERNAL MEDICINE

## 2022-05-04 PROCEDURE — 87205 SMEAR GRAM STAIN: CPT | Performed by: FAMILY MEDICINE

## 2022-05-04 PROCEDURE — 36415 COLL VENOUS BLD VENIPUNCTURE: CPT | Performed by: FAMILY MEDICINE

## 2022-05-04 PROCEDURE — 99233 SBSQ HOSP IP/OBS HIGH 50: CPT | Performed by: FAMILY MEDICINE

## 2022-05-04 PROCEDURE — 120N000001 HC R&B MED SURG/OB

## 2022-05-04 PROCEDURE — 5A09357 ASSISTANCE WITH RESPIRATORY VENTILATION, LESS THAN 24 CONSECUTIVE HOURS, CONTINUOUS POSITIVE AIRWAY PRESSURE: ICD-10-PCS | Performed by: INTERNAL MEDICINE

## 2022-05-04 PROCEDURE — 82040 ASSAY OF SERUM ALBUMIN: CPT | Performed by: INTERNAL MEDICINE

## 2022-05-04 PROCEDURE — 250N000013 HC RX MED GY IP 250 OP 250 PS 637: Performed by: EMERGENCY MEDICINE

## 2022-05-04 PROCEDURE — 250N000011 HC RX IP 250 OP 636: Performed by: INTERNAL MEDICINE

## 2022-05-04 PROCEDURE — 87899 AGENT NOS ASSAY W/OPTIC: CPT | Performed by: INTERNAL MEDICINE

## 2022-05-04 PROCEDURE — 258N000003 HC RX IP 258 OP 636: Performed by: INTERNAL MEDICINE

## 2022-05-04 PROCEDURE — 255N000002 HC RX 255 OP 636: Performed by: FAMILY MEDICINE

## 2022-05-04 PROCEDURE — 96368 THER/DIAG CONCURRENT INF: CPT

## 2022-05-04 PROCEDURE — 87641 MR-STAPH DNA AMP PROBE: CPT | Performed by: INTERNAL MEDICINE

## 2022-05-04 PROCEDURE — 83735 ASSAY OF MAGNESIUM: CPT | Performed by: INTERNAL MEDICINE

## 2022-05-04 PROCEDURE — 93306 TTE W/DOPPLER COMPLETE: CPT | Mod: 26 | Performed by: GENERAL ACUTE CARE HOSPITAL

## 2022-05-04 PROCEDURE — 93970 EXTREMITY STUDY: CPT

## 2022-05-04 PROCEDURE — 250N000013 HC RX MED GY IP 250 OP 250 PS 637: Performed by: INTERNAL MEDICINE

## 2022-05-04 PROCEDURE — 83735 ASSAY OF MAGNESIUM: CPT | Performed by: FAMILY MEDICINE

## 2022-05-04 PROCEDURE — 96367 TX/PROPH/DG ADDL SEQ IV INF: CPT

## 2022-05-04 PROCEDURE — 94660 CPAP INITIATION&MGMT: CPT

## 2022-05-04 PROCEDURE — 71250 CT THORAX DX C-: CPT

## 2022-05-04 PROCEDURE — 83605 ASSAY OF LACTIC ACID: CPT | Performed by: INTERNAL MEDICINE

## 2022-05-04 PROCEDURE — 84484 ASSAY OF TROPONIN QUANT: CPT | Performed by: INTERNAL MEDICINE

## 2022-05-04 PROCEDURE — 82248 BILIRUBIN DIRECT: CPT | Performed by: INTERNAL MEDICINE

## 2022-05-04 PROCEDURE — 96361 HYDRATE IV INFUSION ADD-ON: CPT

## 2022-05-04 PROCEDURE — 999N000157 HC STATISTIC RCP TIME EA 10 MIN

## 2022-05-04 RX ORDER — MAGNESIUM SULFATE HEPTAHYDRATE 40 MG/ML
2 INJECTION, SOLUTION INTRAVENOUS ONCE
Status: COMPLETED | OUTPATIENT
Start: 2022-05-04 | End: 2022-05-04

## 2022-05-04 RX ORDER — ACETAMINOPHEN 650 MG/1
650 SUPPOSITORY RECTAL EVERY 6 HOURS PRN
Status: DISCONTINUED | OUTPATIENT
Start: 2022-05-04 | End: 2022-05-06 | Stop reason: HOSPADM

## 2022-05-04 RX ORDER — CARVEDILOL 12.5 MG/1
12.5 TABLET ORAL 2 TIMES DAILY WITH MEALS
Status: DISCONTINUED | OUTPATIENT
Start: 2022-05-04 | End: 2022-05-05

## 2022-05-04 RX ORDER — ENOXAPARIN SODIUM 100 MG/ML
40 INJECTION SUBCUTANEOUS EVERY 12 HOURS
Status: DISCONTINUED | OUTPATIENT
Start: 2022-05-04 | End: 2022-05-04

## 2022-05-04 RX ORDER — ACETAMINOPHEN 325 MG/1
650 TABLET ORAL EVERY 4 HOURS PRN
Status: DISCONTINUED | OUTPATIENT
Start: 2022-05-04 | End: 2022-05-06 | Stop reason: HOSPADM

## 2022-05-04 RX ORDER — LIDOCAINE 40 MG/G
CREAM TOPICAL
Status: DISCONTINUED | OUTPATIENT
Start: 2022-05-04 | End: 2022-05-06 | Stop reason: HOSPADM

## 2022-05-04 RX ORDER — LANOLIN ALCOHOL/MO/W.PET/CERES
3 CREAM (GRAM) TOPICAL
Status: DISCONTINUED | OUTPATIENT
Start: 2022-05-04 | End: 2022-05-06 | Stop reason: HOSPADM

## 2022-05-04 RX ORDER — VALSARTAN 80 MG/1
320 TABLET ORAL DAILY
Status: DISCONTINUED | OUTPATIENT
Start: 2022-05-04 | End: 2022-05-06 | Stop reason: HOSPADM

## 2022-05-04 RX ORDER — BENZONATATE 100 MG/1
200 CAPSULE ORAL 3 TIMES DAILY
Status: DISCONTINUED | OUTPATIENT
Start: 2022-05-04 | End: 2022-05-06 | Stop reason: HOSPADM

## 2022-05-04 RX ORDER — SODIUM CHLORIDE 9 MG/ML
INJECTION, SOLUTION INTRAVENOUS CONTINUOUS
Status: DISCONTINUED | OUTPATIENT
Start: 2022-05-04 | End: 2022-05-05

## 2022-05-04 RX ORDER — ACETAMINOPHEN 325 MG/1
650 TABLET ORAL ONCE
Status: COMPLETED | OUTPATIENT
Start: 2022-05-04 | End: 2022-05-04

## 2022-05-04 RX ORDER — PIPERACILLIN SODIUM, TAZOBACTAM SODIUM 3; .375 G/15ML; G/15ML
3.38 INJECTION, POWDER, LYOPHILIZED, FOR SOLUTION INTRAVENOUS EVERY 8 HOURS
Status: DISCONTINUED | OUTPATIENT
Start: 2022-05-04 | End: 2022-05-06 | Stop reason: HOSPADM

## 2022-05-04 RX ORDER — ASPIRIN 81 MG/1
81 TABLET, CHEWABLE ORAL DAILY
Status: DISCONTINUED | OUTPATIENT
Start: 2022-05-04 | End: 2022-05-06 | Stop reason: HOSPADM

## 2022-05-04 RX ORDER — HYDROMORPHONE HYDROCHLORIDE 1 MG/ML
0.3 INJECTION, SOLUTION INTRAMUSCULAR; INTRAVENOUS; SUBCUTANEOUS EVERY 6 HOURS PRN
Status: DISCONTINUED | OUTPATIENT
Start: 2022-05-04 | End: 2022-05-06 | Stop reason: HOSPADM

## 2022-05-04 RX ORDER — FUROSEMIDE 20 MG
20 TABLET ORAL DAILY PRN
Status: DISCONTINUED | OUTPATIENT
Start: 2022-05-04 | End: 2022-05-06 | Stop reason: HOSPADM

## 2022-05-04 RX ORDER — AZITHROMYCIN 500 MG/5ML
500 INJECTION, POWDER, LYOPHILIZED, FOR SOLUTION INTRAVENOUS EVERY 24 HOURS
Status: DISCONTINUED | OUTPATIENT
Start: 2022-05-04 | End: 2022-05-06 | Stop reason: HOSPADM

## 2022-05-04 RX ORDER — HYDROCODONE BITARTRATE AND ACETAMINOPHEN 5; 325 MG/1; MG/1
1-2 TABLET ORAL EVERY 4 HOURS PRN
Status: DISCONTINUED | OUTPATIENT
Start: 2022-05-04 | End: 2022-05-06 | Stop reason: HOSPADM

## 2022-05-04 RX ADMIN — GUAIFENESIN 10 ML: 100 SOLUTION ORAL at 02:57

## 2022-05-04 RX ADMIN — HYDROCODONE BITARTRATE AND ACETAMINOPHEN 1 TABLET: 5; 325 TABLET ORAL at 06:02

## 2022-05-04 RX ADMIN — ACETAMINOPHEN 650 MG: 325 TABLET ORAL at 02:55

## 2022-05-04 RX ADMIN — HYDROCODONE BITARTRATE AND ACETAMINOPHEN 2 TABLET: 5; 325 TABLET ORAL at 20:04

## 2022-05-04 RX ADMIN — CARVEDILOL 12.5 MG: 12.5 TABLET, FILM COATED ORAL at 17:46

## 2022-05-04 RX ADMIN — MAGNESIUM SULFATE HEPTAHYDRATE 2 G: 40 INJECTION, SOLUTION INTRAVENOUS at 05:13

## 2022-05-04 RX ADMIN — SODIUM CHLORIDE 100 ML/HR: 9 INJECTION, SOLUTION INTRAVENOUS at 12:10

## 2022-05-04 RX ADMIN — HYDROCODONE BITARTRATE AND ACETAMINOPHEN 2 TABLET: 5; 325 TABLET ORAL at 15:59

## 2022-05-04 RX ADMIN — Medication 1 LOZENGE: at 00:52

## 2022-05-04 RX ADMIN — GUAIFENESIN 10 ML: 100 SOLUTION ORAL at 20:06

## 2022-05-04 RX ADMIN — BENZONATATE 200 MG: 100 CAPSULE ORAL at 22:08

## 2022-05-04 RX ADMIN — ACETAMINOPHEN 650 MG: 325 TABLET ORAL at 00:51

## 2022-05-04 RX ADMIN — Medication 1 LOZENGE: at 03:05

## 2022-05-04 RX ADMIN — SODIUM CHLORIDE 500 ML: 9 INJECTION, SOLUTION INTRAVENOUS at 06:21

## 2022-05-04 RX ADMIN — ASPIRIN 81 MG CHEWABLE TABLET 81 MG: 81 TABLET CHEWABLE at 07:52

## 2022-05-04 RX ADMIN — RIVAROXABAN 20 MG: 20 TABLET, FILM COATED ORAL at 05:57

## 2022-05-04 RX ADMIN — BENZONATATE 200 MG: 100 CAPSULE ORAL at 07:52

## 2022-05-04 RX ADMIN — CARVEDILOL 12.5 MG: 12.5 TABLET, FILM COATED ORAL at 12:07

## 2022-05-04 RX ADMIN — PIPERACILLIN AND TAZOBACTAM 3.38 G: 3; .375 INJECTION, POWDER, LYOPHILIZED, FOR SOLUTION INTRAVENOUS at 22:08

## 2022-05-04 RX ADMIN — PIPERACILLIN AND TAZOBACTAM 3.38 G: 3; .375 INJECTION, POWDER, LYOPHILIZED, FOR SOLUTION INTRAVENOUS at 12:09

## 2022-05-04 RX ADMIN — PERFLUTREN 2 ML: 6.52 INJECTION, SUSPENSION INTRAVENOUS at 09:50

## 2022-05-04 RX ADMIN — AZITHROMYCIN MONOHYDRATE 500 MG: 500 INJECTION, POWDER, LYOPHILIZED, FOR SOLUTION INTRAVENOUS at 04:01

## 2022-05-04 RX ADMIN — Medication 3 MG: at 03:03

## 2022-05-04 RX ADMIN — BENZONATATE 200 MG: 100 CAPSULE ORAL at 13:45

## 2022-05-04 RX ADMIN — SODIUM CHLORIDE: 9 INJECTION, SOLUTION INTRAVENOUS at 07:25

## 2022-05-04 RX ADMIN — PIPERACILLIN AND TAZOBACTAM 3.38 G: 3; .375 INJECTION, POWDER, LYOPHILIZED, FOR SOLUTION INTRAVENOUS at 06:49

## 2022-05-04 ASSESSMENT — ACTIVITIES OF DAILY LIVING (ADL)
ADLS_ACUITY_SCORE: 14
ADLS_ACUITY_SCORE: 12
ADLS_ACUITY_SCORE: 14
ADLS_ACUITY_SCORE: 12
ADLS_ACUITY_SCORE: 14
ADLS_ACUITY_SCORE: 5
ADLS_ACUITY_SCORE: 14
ADLS_ACUITY_SCORE: 14
CHANGE_IN_FUNCTIONAL_STATUS_SINCE_ONSET_OF_CURRENT_ILLNESS/INJURY: NO
WEAR_GLASSES_OR_BLIND: NO
ADLS_ACUITY_SCORE: 14
DIFFICULTY_EATING/SWALLOWING: NO
ADLS_ACUITY_SCORE: 14
CONCENTRATING,_REMEMBERING_OR_MAKING_DECISIONS_DIFFICULTY: NO
ADLS_ACUITY_SCORE: 12
ADLS_ACUITY_SCORE: 14
WALKING_OR_CLIMBING_STAIRS_DIFFICULTY: NO
ADLS_ACUITY_SCORE: 12
ADLS_ACUITY_SCORE: 14
ADLS_ACUITY_SCORE: 5
TOILETING_ISSUES: NO
DOING_ERRANDS_INDEPENDENTLY_DIFFICULTY: NO
ADLS_ACUITY_SCORE: 14
FALL_HISTORY_WITHIN_LAST_SIX_MONTHS: NO
DRESSING/BATHING_DIFFICULTY: NO
ADLS_ACUITY_SCORE: 14
ADLS_ACUITY_SCORE: 14

## 2022-05-04 NOTE — PLAN OF CARE
Goal Outcome Evaluation:    Plan of Care Reviewed With: patient     Overall Patient Progress: improving    Outcome Evaluation: Remains on room air.  Some abdominal pain from coughing, controlled with prn pain medications.  Independent in room.  Procal, WBC, Creat elevated.      Problem: Plan of Care - These are the overarching goals to be used throughout the patient stay.    Goal: Plan of Care Review/Shift Note  Description: The Plan of Care Review/Shift note should be completed every shift.  The Outcome Evaluation is a brief statement about your assessment that the patient is improving, declining, or no change.  This information will be displayed automatically on your shift note.  Outcome: Ongoing, Progressing  Flowsheets (Taken 5/4/2022 1838)  Plan of Care Reviewed With: patient  Outcome Evaluation: Remains on room air.  Some abdominal pain from coughing, controlled with prn pain medications.  Independent in room.  Procal, WBC, Creat elevated.  Overall Patient Progress: improving  Goal: Absence of Hospital-Acquired Illness or Injury  Outcome: Met  Intervention: Identify and Manage Fall Risk  Recent Flowsheet Documentation  Taken 5/4/2022 1703 by Jennifer Salinas, RN  Safety Promotion/Fall Prevention: clutter free environment maintained  Goal: Optimal Comfort and Wellbeing  Outcome: Ongoing, Progressing  Goal: Readiness for Transition of Care  Intervention: Mutually Develop Transition Plan  Recent Flowsheet Documentation  Taken 5/4/2022 1800 by Jennifer Salinas, RN  Equipment Currently Used at Home: none

## 2022-05-04 NOTE — PLAN OF CARE
Problem: Infection (Pneumonia)  Goal: Resolution of Infection Signs and Symptoms  Outcome: Ongoing, Progressing     Problem: Respiratory Compromise (Pneumonia)  Goal: Effective Oxygenation and Ventilation  Outcome: Ongoing, Progressing    Patient boarding down in ED. A&Ox4, pleasant. C/o generalized achy pain and ABD pain. PRN Tylenol and one tab of Norco given. Febrile @ 100.5 on shift. Recheck= 97.9. On RA sating in mid 90's. Soft  BP's on shift, but is asymptomatic. MD notified, bolus given. Does wear home Cpap @ night. Frequent productive (at times) strong cough. Sputum sample collected on shift. Robitussin and cough drop given x1. Mag and K protocol; one bump of Mag given. Both to be rechecked this AM. Up independently. Using urinal @ bedside. Urine sample sent on shift. PIV infusing with intermittent antibiotics. LS with fine crackles in MACI/LLL. Educated on use of IS. Tele: ST on shift. Discharge pending.

## 2022-05-04 NOTE — PROGRESS NOTES
PRIMARY DIAGNOSIS: Rhinovirus  OUTPATIENT/OBSERVATION GOALS TO BE MET BEFORE DISCHARGE:  1. ADLs back to baseline: No    2. Activity and level of assistance: Ambulating independently.    3. Pain status: Improved-controlled with oral pain medications.    4. Return to near baseline physical activity: No     Discharge Planner Nurse   Safe discharge environment identified: Yes  Barriers to discharge: Yes       Entered by: Ashlie Mckinley RN 05/04/2022 4:23 PM   Pt is getting IV antibiotics.  He has pain in his abdomen and back from coughing so much 5/3.

## 2022-05-04 NOTE — PROGRESS NOTES
PRIMARY DIAGNOSIS: Rhinovirus  OUTPATIENT/OBSERVATION GOALS TO BE MET BEFORE DISCHARGE:  1. ADLs back to baseline: Yes    2. Activity and level of assistance: Ambulating independently.    3. Pain status: Improved-controlled with oral pain medications.    4. Return to near baseline physical activity: Yes     Discharge Planner Nurse   Safe discharge environment identified: Yes  Barriers to discharge: Yes       Entered by: Ashlie Mckinley RN 05/04/2022 4:26 PM   Pt is getting IV antibiotics.

## 2022-05-04 NOTE — PROVIDER NOTIFICATION
Paged MD regarding: ED 13- Patient with soft BP's, high 80's to low 90's. Had pt get up to side of bed and urinate. No c/o dizziness or lightheadedness. Asymptomatic. Drinking water well, but urine is jaime. Any new orders? Thanks! [id number]    Addedndum: 500 mL bolus. Labs ordered.

## 2022-05-04 NOTE — PLAN OF CARE
Problem: Infection (Pneumonia)  Goal: Resolution of Infection Signs and Symptoms  Outcome: Ongoing, Progressing     Problem: Respiratory Compromise (Pneumonia)  Goal: Effective Oxygenation and Ventilation  Outcome: Ongoing, Progressing  Intervention: Promote Airway Secretion Clearance  Recent Flowsheet Documentation  Taken 5/4/2022 1210 by Ashlie Segovia RN  Cough And Deep Breathing: done independently per patient  Taken 5/4/2022 0745 by Ashlie Segovia RN  Cough And Deep Breathing: done independently per patient  Intervention: Optimize Oxygenation and Ventilation  Recent Flowsheet Documentation  Taken 5/4/2022 1210 by Ashlie Segovia RN  Head of Bed (HOB) Positioning: HOB at 20-30 degrees  Taken 5/4/2022 0745 by Ashlie Segovia RN  Head of Bed (HOB) Positioning: HOB at 20-30 degrees   Goal Outcome Evaluation:    No complaints of pain.  Pt is independent in his room.  He is getting IV fluids and IV antibiotics.  He is on droplet precautions for Rhinovirus.  His tele is  to [facility].  He uses his call light appropriately for assistance.

## 2022-05-04 NOTE — PHARMACY-VANCOMYCIN DOSING SERVICE
Pharmacy Vancomycin Initial Note  Date of Service May 3, 2022  Patient's  1985  36 year old, male    Indication: Sepsis    Current estimated CrCl = Estimated Creatinine Clearance: 127.9 mL/min (based on SCr of 1.15 mg/dL).    Creatinine for last 3 days  5/3/2022:  8:16 PM Creatinine 1.15 mg/dL    Recent Vancomycin Level(s) for last 3 days  No results found for requested labs within last 72 hours.      Vancomycin IV Administrations (past 72 hours)      No vancomycin orders with administrations in past 72 hours.                Nephrotoxins and other renal medications (From now, onward)    Start     Dose/Rate Route Frequency Ordered Stop    22 220  piperacillin-tazobactam (ZOSYN) 3.375 g vial to attach to  mL bag         3.375 g  over 30 Minutes Intravenous ONCE 22  vancomycin (VANCOCIN) 2,500 mg in sodium chloride 0.9 % 500 mL intermittent infusion         2,500 mg  over 120 Minutes Intravenous ONCE 22            Contrast Orders - past 72 hours (72h ago, onward)    Start     Dose/Rate Route Frequency Stop    22 220  iopamidol (ISOVUE-370) solution 100 mL         100 mL Intravenous ONCE              Plan:  1. Start vancomycin  2500mg x1 dose in ER    Prakash Black [facility]

## 2022-05-04 NOTE — ED TRIAGE NOTES
Pt arrives with complaints of fast heart rate, fever, and cough. Cough the last few weeks. Fast heart rate and fever started today. History of a.fib and heart failure. Took Ibuprofen around 1700. Fever of 101.8 at home.

## 2022-05-04 NOTE — ED PROVIDER NOTES
"EMERGENCY DEPARTMENT ENCOUNTER      NAME: Alfred Rosario  YOB: 1985  MRN: [id number]      FINAL IMPRESSION  1. Sepsis, due to unspecified organism, unspecified whether acute organ dysfunction present (H)    2. Cough    3. Fever, unspecified fever cause        MEDICAL DECISION MAKING   Pertinent Labs & Imaging studies reviewed. (See chart for details)    Alfred Rosario is a 36 year old male who presents for evaluation of a cough, fever, elevated HR, and dyspnea. Records reviewed.  Patient has relatively complicated past medical history including cardiomyopathy with most recent EF of 40%, hypertension, paroxysmal atrial fibrillation throat.  He follows with cardiology open wound.  Today, patient presents with 2 week history of symptoms starting with a cough that has been persistent.  This evening, he spiked a fever and also noticed that his heart rate was elevated.  He endorses mild associated dyspnea and abdominal \"bloating.\"  He admits that he has not been taking his Lasix as prescribed.  Patient is fully vaccinated for COVID and did receive a booster.  He has also been vaccinated for influenza.  [place] so is not sure if he has been around anyone who is sick.  Vitals on arrival notable for low-grade temperature, and tachycardia with heart rate in the 130s.  Blood pressure stable.  No hypoxia on room air. Remainder of history and exam, as below.     I considered viral upper or lower respiratory infection, pneumonia, pulmonary edema/CHF, ACS/ischemia, anemia, electrolyte derangement, sepsis/bacteremia, occult infection, medication noncompliance. Although patient was complaining of some abdominal bloating, he has a benign abdominal exam so I have lower suspicion for acute intra-abdominal infectious process.  Outside of respiratory symptoms, have no localizing symptoms of infection to explain fever and tachycardia.  Certainly, however, vitals and history are concerning for " sepsis/bacteremia.  Discussed options for work-up and management with patient and his significant other.  We have agreed on plan to start with labs, chest x-ray, EKG,  and management of symptoms with a small fluid bolus (given history of CHF), and Tylenol for low-grade fever.  EKG obtained shortly after arrival revealed sinus tachycardia but no evidence of acute ischemia or atrial fibrillation.    ED Course as of 05/04/22 0046   Tue May 03, 2022   2112 WBC(!): 26.7  CBC notable for leukocytosis with WBC of 26.7. HGB stable. PLTs wnle.    2113 BNP(!): 45  Would expect BNP to be more elevated if symptoms 2/2 to acute CHF exacerbation.   2113 Basic metabolic panel(!)  BMP reassuring. No evidence of PALMER, acidosis, or significant electrolyte derangement.   2114 Troponin I: 0.01  Troponin negative. ACS less likely in the setting of ECG without acute ischemic changes and I do not feel delta troponin necessary given timing of symptoms and absence of CP.   2129 Lactic Acid(!): 2.3  Elevated and concerning for infection, especially given leukocytosis, fever, and tachycardia. Will plan to draw cultures, check procalcitonin, and start antibiotics.   2148 XR Chest 2 Views  Chest x-ray was unremarkable and revealed no evidence of widened mediastinum, pneumothorax, mediastinal air, infiltrate, large pleural effusion, significantly enlarged heart, pulmonary edema, or displaced rib fractures.    2148 Symptomatic; Yes; 4/19/2022 Influenza A/B & SARS-CoV2 (COVID-19) Virus PCR Multiplex Nasopharyngeal  Both influenza and COVID swabs negative.      I rechecked the patient multiple times.  He did have ongoing tachycardia and tachypnea but did not appear to be in acute distress.  He requested something to help with the cough and with this, did order Tessalon Perles as well as cough drops.  At this point, source of infection is unclear.  With this, I did add on CT abdomen/pelvis to rule out intra-abdominal process.  This was unremarkable.   Viral illness certainly possible.  I discussed options for further work-up and management with patient and his significant other.  I recommended admission given concern for sepsis/bacteremia of unclear etiology.  They were agreeable and would feel more comfortable with him staying in the hospital overnight.    When pharmacist reviewed medications with patient, he admitted that he had not been taking his Xarelto which she is on his medication list.  With this, do have some concern for PE as etiology of ongoing tachycardia and tachypnea as well as low-grade fever.  Unfortunately, patient is already received contrast for CT abdomen/pelvis so will not be able to have CT of chest this evening.    Discussed the case with Dr. Mcdonnell of [facility] medicine team who agreed to facilitate admission.  We have agreed to add on ultrasound of lower extremities given inability to get CT PE study this evening.   Unfortunately, we do not have any beds available at this time so patient will likely need to sleep in the department overnight.  [facility] medicine team has taken over management.  No acute events under my care.    Critical care: 60 minutes excluding separately billable procedures.  Includes bedside management, time reviewing test results, review of records, discussing the case with staff, documenting the medical record and time spent with family members (or surrogate decision makers) discussing specific treatment issues.        ED COURSE  8:15 PM I met with the patient, obtained history, performed an initial exam, and discussed options and plan for diagnostics and treatment here in the ED.  9:48 PM I rechecked and updated patient.  10:00 PM I rechecked and updated patient.  11:19 PM I discussed case with Dr. Mcdonnell, hospitalist, who accepts patient for admission.    MEDICATIONS GIVEN IN THE ED  Medications   vancomycin (VANCOCIN) 2,500 mg in sodium chloride 0.9 % 500 mL intermittent infusion (2,500 mg Intravenous New Bag  "5/3/22 2342)   benzocaine-menthol (CEPACOL) 15-3.6 MG lozenge 1 lozenge (1 lozenge Buccal Given 5/3/22 2232)   acetaminophen (TYLENOL) tablet 650 mg (has no administration in time range)   acetaminophen (TYLENOL) tablet 975 mg (975 mg Oral Given 5/3/22 2037)   0.9% sodium chloride BOLUS (0 mLs Intravenous Stopped 5/3/22 2231)   benzonatate (TESSALON) capsule 100 mg (100 mg Oral Given 5/3/22 2112)   piperacillin-tazobactam (ZOSYN) 3.375 g vial to attach to  mL bag (0 g Intravenous Stopped 5/3/22 2349)   iopamidol (ISOVUE-370) solution 100 mL (100 mLs Intravenous Given 5/3/22 2221)       NEW PRESCRIPTIONS STARTED AT TODAY'S VISIT  New Prescriptions    No medications on file          =================================================================    Chief Complaint   Patient presents with     Fever     Tachycardia         HPI:    Patient information was obtained from: Patient    Use of : N/A     Alfred Rosario is a 36 year old male with a history of HTN, paroxysmal atrial fibrillation, dyslipidemia, dilated cardiomyopathy, LEE ANN, morbid obesity, who presents to the ED via walk-in for the evaluation of fever and cough.    Patient reports a cough for the past two weeks and increased shortness of breath. He states that he gets a \"tickle\" in his throat then starts having coughing fits. Patient reports some associating shortness of breath but states that cough and shortness of breath improve at night with his C-pap but worsens during the day. Patient reports that he had not had any fever with symptoms until tonight, with measured temperature of 101.9 degrees. He states he took some Ibuprofen around 1700 but denies taking any other medications. Patient also notes that he had been monitoring his heart rate on his watch recently and states that his heart rate was up but does not believe he is in atrial fibrillation. He states that in the past, his cough and respiratory issues was thought to be related to " atrial fibrillation but patient does not believe that current symptoms are attributed to this.     Patient reports associating fatigue and body aches. Otherwise, he denies any chest pain, leg swelling, abdominal pain, nausea, vomiting, diarrhea, and urinary symptoms. He does admit to not taking his Lasix. He is on Xaralto. Patient's significant other believes his symptoms are similar to when he was admitted last summer for pulmonary edema. She also notes that patient seems more stressed out with work. Patient is fully vaccinated against COVID plus booster. 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 Of note, patient had COVID in fall 2021. No other complaints at this time.    RELEVANT HISTORY, MEDICATIONS, & ALLERGIES   Past medical history, surgical history, family history, medications, and allergies reviewed and pertinent noted in HPI. See end of note for comprehensive list.    REVIEW OF SYSTEMS:  Review of Systems   Constitutional: Positive for fatigue and fever.   Respiratory: Positive for cough and shortness of breath.    Cardiovascular: Negative for chest pain and leg swelling.   Gastrointestinal: Negative for abdominal pain, diarrhea, nausea and vomiting.   Genitourinary: Negative for dysuria, frequency, hematuria and urgency.   Musculoskeletal: Positive for myalgias.   All other systems reviewed and are negative.      PHYSICAL EXAM:    Vitals: BP (!) 173/92   Pulse (!) 126   Temp 100.3  F (37.9  C) (Oral)   Resp (!) 32   Ht 1.829 m (6')   Wt 140.6 kg (310 lb)   SpO2 97%   BMI 42.04 kg/m     General: Alert and interactive, comfortable appearing.  HENT: Oropharynx without erythema or exudates. MMM.   Eyes: Pupils mid-sized and equally reactive.   Neck: Full AROM.   Cardiovascular: Tachycardic, regular. Peripheral pulses 2+ bilaterally.  Chest/Pulmonary: Slightly tachypneic but no acute distress.  Intermittent dry cough. Lung sounds clear and equal throughout, no wheezes or crackles. No  chest wall tenderness or deformities.  Abdomen: Soft, nondistended. Nontender without guarding or rebound.  Back/Spine: No CVA or midline tenderness.  Extremities: Normal ROM of all major joints. No lower extremity edema.   Skin: Warm and dry. Normal skin color.   Neuro: Speech clear. CNs grossly intact. Moves all extremities appropriately. Strength and sensation grossly intact to all extremities.   Psych: Normal affect/mood, cooperative, memory appropriate.     LAB  Labs Ordered and Resulted from Time of ED Arrival to Time of ED Departure   LACTIC ACID WHOLE BLOOD - Abnormal       Result Value    Lactic Acid 2.3 (*)    BASIC METABOLIC PANEL - Abnormal    Sodium 139      Potassium 3.7      Chloride 103      Carbon Dioxide (CO2) 21 (*)     Anion Gap 15      Urea Nitrogen 18      Creatinine 1.15      Calcium 9.6      Glucose 109      GFR Estimate 84     B-TYPE NATRIURETIC PEPTIDE ([facility] ONLY) - Abnormal    BNP 45 (*)    CBC WITH PLATELETS - Abnormal    WBC Count 26.7 (*)     RBC Count 5.10      Hemoglobin 15.0      Hematocrit 44.4      MCV 87      MCH 29.4      MCHC 33.8      RDW 13.7      Platelet Count 301     TROPONIN I - Normal    Troponin I 0.01     INFLUENZA A/B & SARS-COV2 PCR MULTIPLEX - Normal    Influenza A PCR Negative      Influenza B PCR Negative      SARS CoV2 PCR Negative     PROCALCITONIN   BLOOD CULTURE   BLOOD CULTURE       RADIOLOGY  US Lower Extremity Venous Duplex Bilateral   Final Result   IMPRESSION:   1.  No deep venous thrombosis in the bilateral lower extremities.      CT Abdomen Pelvis w Contrast   Final Result   IMPRESSION:    1.  Left lower lobe pneumonia versus aspiration, recommend follow-up to resolution.   2.  Normal appendix. No obstruction, colitis, or diverticulitis.   3.  Mild atherosclerotic vascular calcifications.      XR Chest 2 Views   Final Result   IMPRESSION: Negative chest. Resolution of the previous infiltrates.          EKG  Performed at: [facility]  Emergency Department. 03-May-2022, 20:13:38.   Impression: Sinus tachycardia.  No acute ischemic changes.  Rate: 120 BPM  Rhythm: Sinus   QRS Interval: 72 ms  QTc Interval: 407 ms  Comparison: 08-Feb-2011 , 20:42.    All laboratory and imaging results and EKG's were personally reviewed and interpreted by myself prior to disposition decision.         Comprehensive outline of EPIC chart Hx  PAST MEDICAL HISTORY    Past Medical History:   Diagnosis Date     Acute respiratory failure with hypoxia (H) 7/6/2021     Atrial fibrillation (H)      Congestive heart failure (H)      Hypertension      Hypertensive urgency 7/6/2021     Past Surgical History:   Procedure Laterality Date     ORTHOPEDIC SURGERY  2011    L foot metatarsal       CURRENT MEDICATIONS    Current Outpatient Medications   Medication Instructions     amoxicillin-clavulanate (AUGMENTIN) 875-125 MG per tablet 1 tablet, Oral, DAILY     aspirin (ASA) 81 mg, DAILY     carvedilol (COREG) 37.5 mg, Oral, 2 TIMES DAILY WITH MEALS     Docosahexaenoic Acid (DHA) 2 g, DAILY     furosemide (LASIX) 20 mg, Oral, DAILY PRN     METOPROLOL TARTRATE  mg, Oral, DAILY     multivitamin (MEN'S MULTI-VITAMIN) per tablet 1 tablet, DAILY     rivaroxaban ANTICOAGULANT (XARELTO ANTICOAGULANT) 20 mg, Oral, DAILY     valsartan (DIOVAN) 320 MG tablet TAKE 1 TABLET BY MOUTH EVERY DAY       ALLERGIES    No Known Allergies    FAMILY HISTORY    Family History   Problem Relation Age of Onset     No Known Problems Mother      Hypertension Father      No Known Problems Paternal Grandfather        SOCIAL HISTORY    Social History     Socioeconomic History     Marital status:    Tobacco Use     Smoking status: Never Smoker     Smokeless tobacco: Never Used   Substance and Sexual Activity     Alcohol use: Not Currently     Drug use: Never     Sexual activity: Yes     Partners: Female   Other Topics Concern     Parent/sibling w/ CABG, MI or angioplasty before 65F 55M? No   Social  History Narrative    ** Merged History Encounter **            I, Nicky Ortega, am serving as a scribe to document services personally performed by Dr. Shelley Garcia based on my observation and the provider's statements to me. I, Shelley Garcia MD attest that Nicky Ortega is acting in a scribe capacity, has observed my performance of the services and has documented them in accordance with my direction.    Shelley Garcia M.D.  Emergency Medicine  [facility] EMERGENCY ROOM  [id number] [id number]  [phone]  Dept: [phone]     Shelley Garcia MD  05/04/22 9212

## 2022-05-04 NOTE — PHARMACY-ADMISSION MEDICATION HISTORY
Pharmacy Note - Admission Medication History    Pertinent Provider Information: patient stopped taking xarelto, his last pharmacy refill was august of 2021 for 90 days     ______________________________________________________________________    Prior To Admission (PTA) med list completed and updated in EMR.       PTA Med List   Medication Sig Last Dose     aspirin 81 mg chewable tablet [ASPIRIN 81 MG CHEWABLE TABLET] Chew 81 mg daily. 5/3/2022 at Unknown time     carvedilol (COREG) 25 MG tablet TAKE 1.5 TABLETS (37.5 MG) BY MOUTH 2 TIMES DAILY (WITH MEALS) 5/3/2022 at Unknown time     docosahexaenoic acid-epa 120-180 mg cap [DOCOSAHEXAENOIC ACID--180 MG CAP] Take 2 g by mouth daily. 5/3/2022 at Unknown time     furosemide (LASIX) 20 MG tablet TAKE 1 TABLET (20 MG) BY MOUTH DAILY AS NEEDED (INCREASE WT GAIN) Unknown at Unknown time     melatonin 5 MG tablet Take 5 mg by mouth nightly as needed for sleep 5/2/2022 at Unknown time     multivitamin (MEN'S MULTI-VITAMIN) per tablet [MULTIVITAMIN (MEN'S MULTI-VITAMIN) PER TABLET] Take 1 tablet by mouth daily. 5/3/2022 at Unknown time     valsartan (DIOVAN) 320 MG tablet TAKE 1 TABLET BY MOUTH EVERY DAY 5/3/2022 at Unknown time       Information source(s): Patient  Method of interview communication: in-person    Summary of Changes to PTA Med List  New: melatonin  Discontinued: xarelto      Patient was asked about OTC/herbal products specifically.  PTA med list reflects this.    .    Allergies were reviewed, assessed, and updated with the patient.      Patient does not anticipate needing any multi-use medications during admission.    The information provided in this note is only as accurate as the sources available at the time of the update(s).    Thank you for the opportunity to participate in the care of this patient.    Bradley Rodriguez RPH  5/3/2022 9:56 PM

## 2022-05-04 NOTE — H&P
[facility] MEDICINE ADMISSION HISTORY AND PHYSICAL       Assessment & Plan      1. Left lower lobe PNA r/o aspiration  - denies chocking. No issues with swallowing. Concerns for sepsis     - Will get dedicated CT chest to further evaluate, his chest XR - unremarkable.   - Continue Zosyn, add azithro Holding further vanco - no true MRSA risk. Will check for MRSA nasal  - AM labs  - Sputum culture, urinary antigens and respiratory panel    2. Known CHF on cardiac meds, but not sure how regular and compliant. His EF last 2021 was 40%. He also has history of afib and he stopped his xarelto last year. He has mild BNP elev. Currently in SR     Drop in EF not really sure of the cause - has history of Angiogram in 2016 showed minimal CAD, No info if need further ischemic work up    His CHADS vasc score is 2 - HTN and CHF. Prior records indicate he should be on chronic xarelto therapy. Will resume this. No contraindications from what I see. He is aware without it, he is at risk for thromboembolism.     - resume cardiac meds  - ECHO to eval EF    3. HTN - resume BP meds    4. LEE ANN on CPAP, has BMI of 42 -- The patient has morbid obesity with BMI > 40, affecting the patient s current medical condition. This has required use of extra resources, including treatment, assistance from extra staff, and the usage of bariatric equipment (wheelchair, bed, BP cuff, scales, etc.).    5. Abdominal pain  -- abdominal CT scan is negative. He said, its more on abdominal wall from coughing. Pain meds        550A -- His HR improved to 105 from 120s last night. But this AM Soft BP, low 90s to high 80s per RN. CT chest no clear evidence of CHF. BNP elev mild and could be false elev with obesity. We did talk earlier about risks of thromboembolism without xarelto, and he is OK to resume. He was informed to be compliant.     Will give 500 ml bolus now. Hold BP lowering meds due this AM. IVF rate inc to 100 ml/hr. Urine was  reported jaime, likely concentrated/dehydrated        Med reconciliation -- Done  VTE prophylaxis: SCDs  IV fluids: Per order set   Diet:  Cardiac   Code Status: Full,  COVID test result:  negative   COVID vaccination: completed   Barriers to discharge: admitting clinical condition  Discharge Disposition and goals:  Unable to determine at this point, pending clinical progress and response to treatment. Patient may need transfer to SNF or [facility] if unsafe to go home and needed treatment inappropriate at home setting OR may need home health care evaluation if care can be delivered at home settings. Consider referral to care manager/    PPE - I was wearing PPE when I met the patient - N95 mask, Surgical mask, Isolation gown, Gloves, Safety glasses.      Care plan was created based on available information provided, including patient's condition at the time of encounter.   This plan was discussed with patient and/or family members using layman's terms and have agreed to proceed.   At the end of night shift (9PM - 730A), this case will be presented to the AM Hospitalist.    It is recommended to revise care plan and review history if there is change in condition and/or new clinical information is not available during my encounter.     All or some of home medication/s were not resumed on admission due to safety reasons or contraindications. Dosing and frequency may also have been modified. Please resume/review them during your visit.     70 minutes of total visit duration and greater than 50% was spent in direct evaluation of patient and coordination of care including discussion of diagnostic test results and recommended treatment. .      Quang Mcdonnell MD, MPH, FACP, [facility]  Internal Medicine - Hospitalist        Chief Complaint SOB      HISTORY     - Met him in the ED. He was coughing incessantly. He was SOB. Its been going on for 2 weeks. He works at [employer]. He did test himself for COVID and was negative. His  cough was dry. No runny nose. He did not develop fever until in ED. I did check his temp and still 100.5 despite earlier tylenol and he is tachy to 120s in sinus rhythm    - He denies CP. Has abdominal pain but no vomiting or diarrhea. He said its from coughing. He feels its worse at night. But when he wears his CPAP he is good.      - In the ED, CT showed -                                                                  IMPRESSION:   1.  Left lower lobe pneumonia versus aspiration, recommend follow-up to resolution.  2.  Normal appendix. No obstruction, colitis, or diverticulitis.  3.  Mild atherosclerotic vascular calcifications.    - His WBC was 26 thou. He was given Vanco, Zosyn     - ROS --- No headache. No dizziness. No weakness.  No palpitations. . No nausea or vomiting. No urinary symptoms. No bleeding symptoms. No weight loss. Rest of 12 point ROS was reviewed and negative.       Past Medical History     Past Medical History:   Diagnosis Date     Acute respiratory failure with hypoxia (H) 7/6/2021     Atrial fibrillation (H)      Congestive heart failure (H)      Hypertension      Hypertensive urgency 7/6/2021         Surgical History     Past Surgical History:   Procedure Laterality Date     ORTHOPEDIC SURGERY  2011    L foot metatarsal        Family History      Family History   Problem Relation Age of Onset     No Known Problems Mother      Hypertension Father      No Known Problems Paternal Grandfather          Social History      .  Social History     Socioeconomic History     Marital status:      Spouse name: Not on file     Number of children: Not on file     Years of education: Not on file     Highest education level: Not on file   Occupational History     Not on file   Tobacco Use     Smoking status: Never Smoker     Smokeless tobacco: Never Used   Substance and Sexual Activity     Alcohol use: Not Currently     Drug use: Never     Sexual activity: Yes     Partners: Female   Other Topics  Concern     Parent/sibling w/ CABG, MI or angioplasty before 65F 55M? No   Social History Narrative    ** Merged History Encounter **          Social Determinants of Health     Financial Resource Strain: Not on file   Food Insecurity: Not on file   Transportation Needs: Not on file   Physical Activity: Not on file   Stress: Not on file   Social Connections: Not on file   Intimate Partner Violence: Not on file   Housing Stability: Not on file          Allergies      No Known Allergies      Prior to Admission Medications      No current facility-administered medications on file prior to encounter.  aspirin 81 mg chewable tablet, [ASPIRIN 81 MG CHEWABLE TABLET] Chew 81 mg daily.  carvedilol (COREG) 25 MG tablet, TAKE 1.5 TABLETS (37.5 MG) BY MOUTH 2 TIMES DAILY (WITH MEALS)  docosahexaenoic acid-epa 120-180 mg cap, [DOCOSAHEXAENOIC ACID--180 MG CAP] Take 2 g by mouth daily.  furosemide (LASIX) 20 MG tablet, TAKE 1 TABLET (20 MG) BY MOUTH DAILY AS NEEDED (INCREASE WT GAIN)  melatonin 5 MG tablet, Take 5 mg by mouth nightly as needed for sleep  multivitamin (MEN'S MULTI-VITAMIN) per tablet, [MULTIVITAMIN (MEN'S MULTI-VITAMIN) PER TABLET] Take 1 tablet by mouth daily.  valsartan (DIOVAN) 320 MG tablet, TAKE 1 TABLET BY MOUTH EVERY DAY            Review of Systems     A 12 point comprehensive review of systems was negative except as noted above in HPI.    PHYSICAL EXAMINATION       Vitals      Vitals: BP (!) 173/92   Pulse (!) 126   Temp 100.3  F (37.9  C) (Oral)   Resp (!) 32   Ht 1.829 m (6')   Wt 140.6 kg (310 lb)   SpO2 97%   BMI 42.04 kg/m    BMI= Body mass index is 42.04 kg/m .      Examination     General Appearance:  Alert, cooperative, no distress  Head:    Normocephalic, without obvious abnormality, atraumatic  EENT:  PERRL, conjunctiva/corneas clear, EOM's intact.   Neck:   Supple, symmetrical, trachea midline, no adenopathy; no NVE  Back:  Symmetric, no curvature, no CVA tenderness  Chest/Lungs:   Decreased air entry, (+) basal rales, (+) rhonchi,  No tenderness or deformity. No abdominal breathing or use of accessory muscles.   Heart:    Regular rate and rhythm, S1 and S2 normal, no murmur, rub   or gallop  Abdomen: Soft, non-tender, bowel sounds active all four quadrants, not peritoneal on palpation. Not distended  Extremities:  Extremities normal, atraumatic, no swelling   Skin:  Skin color, texture, turgor normal, no rashes or lesion  Neurologic:  Awake and alert, No lateralizing or localizing signs             Pertinent Lab     Results for orders placed or performed during the hospital encounter of 05/03/22   XR Chest 2 Views    Impression    IMPRESSION: Negative chest. Resolution of the previous infiltrates.   CT Abdomen Pelvis w Contrast    Impression    IMPRESSION:   1.  Left lower lobe pneumonia versus aspiration, recommend follow-up to resolution.  2.  Normal appendix. No obstruction, colitis, or diverticulitis.  3.  Mild atherosclerotic vascular calcifications.   Extra Green Top (Lithium Heparin) Tube   Result Value Ref Range    Hold Specimen JIC    Extra Purple Top Tube   Result Value Ref Range    Hold Specimen JIC    Lactic acid whole blood   Result Value Ref Range    Lactic Acid 2.3 (H) 0.7 - 2.0 mmol/L   Result Value Ref Range    Troponin I 0.01 0.00 - 0.29 ng/mL   Basic metabolic panel   Result Value Ref Range    Sodium 139 136 - 145 mmol/L    Potassium 3.7 3.5 - 5.0 mmol/L    Chloride 103 98 - 107 mmol/L    Carbon Dioxide (CO2) 21 (L) 22 - 31 mmol/L    Anion Gap 15 5 - 18 mmol/L    Urea Nitrogen 18 8 - 22 mg/dL    Creatinine 1.15 0.70 - 1.30 mg/dL    Calcium 9.6 8.5 - 10.5 mg/dL    Glucose 109 70 - 125 mg/dL    GFR Estimate 84 >60 mL/min/1.73m2   B-Type Natriuretic Peptide ([facility] Only)   Result Value Ref Range    BNP 45 (H) 0 - 35 pg/mL   CBC (+ platelets, no diff)   Result Value Ref Range    WBC Count 26.7 (H) 4.0 - 11.0 10e3/uL    RBC Count 5.10 4.40 - 5.90 10e6/uL    Hemoglobin 15.0 13.3  - 17.7 g/dL    Hematocrit 44.4 40.0 - 53.0 %    MCV 87 78 - 100 fL    MCH 29.4 26.5 - 33.0 pg    MCHC 33.8 31.5 - 36.5 g/dL    RDW 13.7 10.0 - 15.0 %    Platelet Count 301 150 - 450 10e3/uL   Symptomatic; Yes; 4/19/2022 Influenza A/B & SARS-CoV2 (COVID-19) Virus PCR Multiplex Nasopharyngeal    Specimen: Nasopharyngeal; Swab   Result Value Ref Range    Influenza A PCR Negative Negative    Influenza B PCR Negative Negative    SARS CoV2 PCR Negative Negative           Pertinent Radiology

## 2022-05-04 NOTE — PROGRESS NOTES
[facility] Medicine PROGRESS NOTE      Identification/Summary: Alfred Rosario is a 36 year old male with a past medical history of HTN, paroxysmal atrial fibrillation, dyslipidemia, dilated cardiomyopathy, LEE ANN, morbid obesity who presented to the ED on 5/3/22 with cough for the past 2 weeks and increasing shortness of breath. CT of the chest showed left lower lobe consolidation compatible with pneumonia or aspiration. Respiratory panel positive for rhinovirus. WBC 34.8 on admission. Procalcitonin 28. Patient on IV zosyn and azithromycin. Blood cultures pending.     Assessment and Plan:    Left lower lobe pneumonia  Rhinovirus with likely secondary bacterial infection  WBC 34.8  Procalcitonin 28  Chest CT showed left lower lobe consolidation  Pending MRSA nasal swab  Sputum culture contaminated, patient not having much sputum. Will not recollect.   Continue IV z piperacillin-tazobactam, azithro   Blood cultures pending    Sepsis, now improved with normalization of lactic acid  Secondary to pneumonia with white blood count 34,800, tachycardia and hypotension and acute kidney injury  Lactic acid has now normalized  Patient responded to fluid boluses  He reports he is feeling much improved since time of admission  Plan: Continue IV fluids and IV piperacillin-tazobactam and IV azithromycin    Chronic CHF  Not sure how compliant he is with medications  EF 2021 40%, echocardiogram today shows normal EF  BNP mildly elevated, likely falsely elevated due to obesity  Aspirin 81 mg daily  Lasix 20 mg daily prn with increase weight gain by history but with hypotension will not give this  Supposed to be on carvedilol 37.5 mg twice daily  Plan: With borderline blood pressures will restart carvedilol 12.5 mg twice daily and reassess in the morning    Paroxysmal atrial fibrillation history.  Currently in sinus rhythm  Patient stopped taking rivaroxaban last year  Nocturnist restarted rivaroxaban  CHADS vasc score 2  (HTN and CHF)    Essential HTN, with current hypotension  Patient's blood pressures low in ED  On valsartan 320 mg QD, carvedilol 37.5 mg BID PTA  Decreased carvedilol to 12.5 mg BID  Hold valsartan    LEE ANN on CPAP  Morbid obesity, BMI 42    Abdominal pain proved from admission  Abdominal CT negative  Likely abdominal wall pain from coughing      Diet: Combination Diet Low Saturated Fat Na <2400mg Diet, No Caffeine Diet  DVT Prophylaxis:   Rivaroxaban  Code Status: Full Code    Anticipated possible discharge in about 2 days once clinical improvement milestones are met.    Interval History/Subjective:  Patient reports he is feeling better. No shortness of breath, just tightness/sore. He denies chest pain. Abdominal pain from coughing improved. He notes he is coughing less frequently.  Cough started 2 weeks ago.  Denies any nausea.    Physical Exam/Objective:  Vitals I/O   Temp: 98.2  F (36.8  C) Temp  Min: 97.9  F (36.6  C)  Max: 100.5  F (38.1  C)  Resp: (!) 36   Resp  Min: 20  Max: 42  SpO2: 95 % SpO2  Min: 93 %  Max: 97 %  Pulse: 103 Pulse  Min: 101  Max: 128    No data recorded  BP: 114/58 Systolic (24hrs), Av , Min:81 , Max:173   Diastolic (24hrs), Av, Min:49, Max:108    I/O last 3 completed shifts:  In: 600 [IV Piggyback:600]  Out: 200 [Urine:200]     Body mass index is 42.04 kg/m .    General Appearance:  Alert, cooperative, no distress   Head:  Normocephalic, atraumatic   Eyes:  PERRL    Throat:  mucosa; moist   Neck: No JVD, thyromegaly   Lungs:   Decreased breath sounds in left lower, mild rales at the left base, no wheezing   Chest Wall:  No deformity   Heart:  Regular rate and rhythm, S1, S2 normal,no murmur   Abdomen:   Soft, non tender, non distended, bowel sounds present, no guarding or rigidity   Extremities: No edema, no joint swelling   Skin: Skin color, texture, turgor normal, no rashes or lesions   Neurologic: Alert and appears cognitively intact, Moves all 4 extremities       Medications:    Personally Reviewed.    aspirin  81 mg Oral Daily     azithromycin  500 mg Intravenous Q24H     benzonatate  200 mg Oral TID     carvedilol  12.5 mg Oral BID w/meals     piperacillin-tazobactam  3.375 g Intravenous Q8H     [START ON 5/5/2022] rivaroxaban ANTICOAGULANT  20 mg Oral Daily with supper     sodium chloride (PF)  3 mL Intracatheter Q8H     [Held by provider] valsartan  320 mg Oral Daily       Data reviewed today: I personally reviewed all new medications, labs, imaging/diagnostics reports over the past 24 hours.     Labs:  Most Recent 3 CBC's:Recent Labs   Lab Test 05/04/22  0629 05/03/22 2016 07/07/21  0517   WBC 34.8* 26.7* 11.7*   HGB 14.5 15.0 15.6   MCV 91 87 86    301 379     Most Recent 3 BMP's:Recent Labs   Lab Test 05/04/22  0629 05/03/22 2016 07/13/21  1542   * 139 138   POTASSIUM 4.5 3.7 4.4   CHLORIDE 104 103 103   CO2 18* 21* 22   BUN 21 18 23*   CR 1.54* 1.15 1.14   ANIONGAP 13 15 13   TAMEKA 8.8 9.6 9.7    109 102     Procalcitonin: 13.69 (5/3/22) --> 28.00 (5/4)    Imaging:   Recent Results (from the past 24 hour(s))   XR Chest 2 Views    Narrative    EXAM: XR CHEST 2 VW  LOCATION: [facility]  DATE/TIME: 5/3/2022 9:18 PM    INDICATION: cough  COMPARISON: 07/06/2021      Impression    IMPRESSION: Negative chest. Resolution of the previous infiltrates.   CT Abdomen Pelvis w Contrast    Narrative    EXAM: CT ABDOMEN PELVIS W CONTRAST  LOCATION: [facility]  DATE/TIME: 5/3/2022 10:20 PM    INDICATION: Fever, distention, bloating  COMPARISON: 07/06/2021  TECHNIQUE: CT scan of the abdomen and pelvis was performed following injection of IV contrast. Multiplanar reformats were obtained. Dose reduction techniques were used.  CONTRAST: Isovue 370 100mL    FINDINGS:   LOWER CHEST: Left lower lobe focal consolidation, may reflect pneumonia in the proper clinical context, recommend follow-up to resolution.    HEPATOBILIARY:  Normal.    PANCREAS: Normal.    SPLEEN: Normal.    ADRENAL GLANDS: Normal.    KIDNEYS/BLADDER: Normal.    BOWEL: Normal appendix. No bowel obstruction, colitis, or diverticulitis.    LYMPH NODES: Normal.    VASCULATURE: No aneurysm. Mild atherosclerotic vascular calcifications.    PELVIC ORGANS: Normal.    MUSCULOSKELETAL: Mild to moderate multilevel discogenic degenerative change. L5 spondylolysis without significant anterolisthesis.      Impression    IMPRESSION:   1.  Left lower lobe pneumonia versus aspiration, recommend follow-up to resolution.  2.  Normal appendix. No obstruction, colitis, or diverticulitis.  3.  Mild atherosclerotic vascular calcifications.   US Lower Extremity Venous Duplex Bilateral    Narrative    EXAM: US LOWER EXTREMITY VENOUS DUPLEX BILATERAL  LOCATION: [facility]  DATE/TIME: 5/3/2022 11:59 PM    INDICATION: Shortness of breath  COMPARISON: None.  TECHNIQUE: Venous Duplex ultrasound of bilateral lower extremities with and without compression, augmentation and duplex. Color flow and spectral Doppler with waveform analysis performed.    FINDINGS: Exam includes the common femoral, femoral, popliteal veins as well as segmentally visualized deep calf veins and greater saphenous vein.     RIGHT: No deep vein thrombosis. No superficial thrombophlebitis. No popliteal cyst.    LEFT: No deep vein thrombosis. No superficial thrombophlebitis. No popliteal cyst.      Impression    IMPRESSION:  1.  No deep venous thrombosis in the bilateral lower extremities.   CT Chest w/o Contrast    Narrative    EXAM: CT CHEST W/O CONTRAST  LOCATION: [facility]  DATE/TIME: 5/4/2022 1:44 AM    INDICATION: Cough, persistent  COMPARISON: Abdominal CT dated 05/03/2022, chest CT dated 07/06/2021  TECHNIQUE: CT chest without IV contrast. Multiplanar reformats were obtained. Dose reduction techniques were used.  CONTRAST: None.    FINDINGS:   LUNGS AND PLEURA: Medial  segment prominent left lower lobe airspace disease compatible with pneumonia or aspiration redemonstrated. Left upper, right upper, right middle, and right lower lobes are clear. Patent central airways.    MEDIASTINUM/AXILLAE: No significant mediastinal or hilar adenopathy.    CORONARY ARTERY CALCIFICATION: None.    UPPER ABDOMEN: Unremarkable    MUSCULOSKELETAL: No acute bony abnormalities      Impression    IMPRESSION:   1.  Left lower lobe consolidation compatible with pneumonia or aspiration. Follow-up to resolution recommended.           KASSANDRA Turner Student  Hospitalist  [facility]     Patient seen and examined  Patient discussed with physician assistant student  Agree with assessment and plan as outlined above    Jamie Martínez MD  [facility] Medicine Service

## 2022-05-05 LAB
ACINETOBACTER SPECIES: NOT DETECTED
ANION GAP SERPL CALCULATED.3IONS-SCNC: 7 MMOL/L (ref 5–18)
BASOPHILS # BLD AUTO: 0.1 10E3/UL (ref 0–0.2)
BASOPHILS NFR BLD AUTO: 0 %
BUN SERPL-MCNC: 20 MG/DL (ref 8–22)
CALCIUM SERPL-MCNC: 8.9 MG/DL (ref 8.5–10.5)
CHLORIDE BLD-SCNC: 104 MMOL/L (ref 98–107)
CITROBACTER SPECIES: NOT DETECTED
CO2 SERPL-SCNC: 24 MMOL/L (ref 22–31)
CREAT SERPL-MCNC: 1.16 MG/DL (ref 0.7–1.3)
CTX-M: NORMAL
ENTEROBACTER SPECIES: NOT DETECTED
EOSINOPHIL # BLD AUTO: 0.2 10E3/UL (ref 0–0.7)
EOSINOPHIL NFR BLD AUTO: 1 %
ERYTHROCYTE [DISTWIDTH] IN BLOOD BY AUTOMATED COUNT: 14.6 % (ref 10–15)
ESCHERICHIA COLI: NOT DETECTED
GFR SERPL CREATININE-BSD FRML MDRD: 83 ML/MIN/1.73M2
GLUCOSE BLD-MCNC: 107 MG/DL (ref 70–125)
HCT VFR BLD AUTO: 39.6 % (ref 40–53)
HGB BLD-MCNC: 13.1 G/DL (ref 13.3–17.7)
IMM GRANULOCYTES # BLD: 0.5 10E3/UL
IMM GRANULOCYTES NFR BLD: 2 %
IMP: NORMAL
KLEBSIELLA OXYTOCA: NOT DETECTED
KLEBSIELLA PNEUMONIAE: NOT DETECTED
KPC: NORMAL
LACTATE SERPL-SCNC: 1 MMOL/L (ref 0.7–2)
LYMPHOCYTES # BLD AUTO: 2.7 10E3/UL (ref 0.8–5.3)
LYMPHOCYTES NFR BLD AUTO: 10 %
MAGNESIUM SERPL-MCNC: 2.2 MG/DL (ref 1.8–2.6)
MCH RBC QN AUTO: 29.6 PG (ref 26.5–33)
MCHC RBC AUTO-ENTMCNC: 33.1 G/DL (ref 31.5–36.5)
MCV RBC AUTO: 90 FL (ref 78–100)
MONOCYTES # BLD AUTO: 1.4 10E3/UL (ref 0–1.3)
MONOCYTES NFR BLD AUTO: 5 %
NDM: NORMAL
NEUTROPHILS # BLD AUTO: 23.1 10E3/UL (ref 1.6–8.3)
NEUTROPHILS NFR BLD AUTO: 82 %
NRBC # BLD AUTO: 0 10E3/UL
NRBC BLD AUTO-RTO: 0 /100
OXA (DETECTED/NOT DETECTED): NORMAL
PLATELET # BLD AUTO: 308 10E3/UL (ref 150–450)
POTASSIUM BLD-SCNC: 4.1 MMOL/L (ref 3.5–5)
PROTEUS SPECIES: NOT DETECTED
PSEUDOMONAS AERUGINOSA: NOT DETECTED
RBC # BLD AUTO: 4.42 10E6/UL (ref 4.4–5.9)
SODIUM SERPL-SCNC: 135 MMOL/L (ref 136–145)
VIM: NORMAL
WBC # BLD AUTO: 27.9 10E3/UL (ref 4–11)

## 2022-05-05 PROCEDURE — 120N000001 HC R&B MED SURG/OB

## 2022-05-05 PROCEDURE — 258N000003 HC RX IP 258 OP 636: Performed by: INTERNAL MEDICINE

## 2022-05-05 PROCEDURE — 36415 COLL VENOUS BLD VENIPUNCTURE: CPT | Performed by: FAMILY MEDICINE

## 2022-05-05 PROCEDURE — 250N000013 HC RX MED GY IP 250 OP 250 PS 637: Performed by: FAMILY MEDICINE

## 2022-05-05 PROCEDURE — 83735 ASSAY OF MAGNESIUM: CPT | Performed by: FAMILY MEDICINE

## 2022-05-05 PROCEDURE — 99233 SBSQ HOSP IP/OBS HIGH 50: CPT | Performed by: FAMILY MEDICINE

## 2022-05-05 PROCEDURE — 83605 ASSAY OF LACTIC ACID: CPT | Performed by: FAMILY MEDICINE

## 2022-05-05 PROCEDURE — 250N000011 HC RX IP 250 OP 636: Performed by: INTERNAL MEDICINE

## 2022-05-05 PROCEDURE — 80048 BASIC METABOLIC PNL TOTAL CA: CPT | Performed by: FAMILY MEDICINE

## 2022-05-05 PROCEDURE — 250N000013 HC RX MED GY IP 250 OP 250 PS 637: Performed by: INTERNAL MEDICINE

## 2022-05-05 PROCEDURE — 85025 COMPLETE CBC W/AUTO DIFF WBC: CPT | Performed by: FAMILY MEDICINE

## 2022-05-05 RX ORDER — VALSARTAN 80 MG/1
160 TABLET ORAL DAILY
Status: DISCONTINUED | OUTPATIENT
Start: 2022-05-05 | End: 2022-05-06 | Stop reason: HOSPADM

## 2022-05-05 RX ORDER — CARVEDILOL 12.5 MG/1
12.5 TABLET ORAL ONCE
Status: COMPLETED | OUTPATIENT
Start: 2022-05-05 | End: 2022-05-05

## 2022-05-05 RX ADMIN — BENZONATATE 200 MG: 100 CAPSULE ORAL at 08:37

## 2022-05-05 RX ADMIN — BENZONATATE 200 MG: 100 CAPSULE ORAL at 21:51

## 2022-05-05 RX ADMIN — HYDROCODONE BITARTRATE AND ACETAMINOPHEN 2 TABLET: 5; 325 TABLET ORAL at 20:23

## 2022-05-05 RX ADMIN — PIPERACILLIN AND TAZOBACTAM 3.38 G: 3; .375 INJECTION, POWDER, LYOPHILIZED, FOR SOLUTION INTRAVENOUS at 21:51

## 2022-05-05 RX ADMIN — VALSARTAN 160 MG: 80 TABLET, FILM COATED ORAL at 18:44

## 2022-05-05 RX ADMIN — PIPERACILLIN AND TAZOBACTAM 3.38 G: 3; .375 INJECTION, POWDER, LYOPHILIZED, FOR SOLUTION INTRAVENOUS at 04:10

## 2022-05-05 RX ADMIN — AZITHROMYCIN MONOHYDRATE 500 MG: 500 INJECTION, POWDER, LYOPHILIZED, FOR SOLUTION INTRAVENOUS at 01:07

## 2022-05-05 RX ADMIN — RIVAROXABAN 20 MG: 10 TABLET, FILM COATED ORAL at 17:04

## 2022-05-05 RX ADMIN — PIPERACILLIN AND TAZOBACTAM 3.38 G: 3; .375 INJECTION, POWDER, LYOPHILIZED, FOR SOLUTION INTRAVENOUS at 12:39

## 2022-05-05 RX ADMIN — BENZONATATE 200 MG: 100 CAPSULE ORAL at 13:48

## 2022-05-05 RX ADMIN — ASPIRIN 81 MG CHEWABLE TABLET 81 MG: 81 TABLET CHEWABLE at 08:37

## 2022-05-05 RX ADMIN — CARVEDILOL 37.5 MG: 25 TABLET, FILM COATED ORAL at 18:44

## 2022-05-05 RX ADMIN — CARVEDILOL 12.5 MG: 12.5 TABLET, FILM COATED ORAL at 08:37

## 2022-05-05 RX ADMIN — CARVEDILOL 12.5 MG: 12.5 TABLET, FILM COATED ORAL at 12:39

## 2022-05-05 RX ADMIN — GUAIFENESIN 10 ML: 100 SOLUTION ORAL at 20:20

## 2022-05-05 RX ADMIN — HYDROCODONE BITARTRATE AND ACETAMINOPHEN 2 TABLET: 5; 325 TABLET ORAL at 01:07

## 2022-05-05 ASSESSMENT — ACTIVITIES OF DAILY LIVING (ADL)
ADLS_ACUITY_SCORE: 5

## 2022-05-05 NOTE — PROGRESS NOTES
Chart review completed per CM protocol. Per chart review, patient admitted from home where he lives with his wife, Nicky, due to a persistent cough. Patient diagnosed with Human rhinovirus so in droplet precautions. COVID test was negative on 5/3/2022. At baseline, per chart review[place].     Anticipate patient will return home at discharge.      CM will continue to monitor progression of care, team recommendations and provide discharge planning assist as needed.

## 2022-05-05 NOTE — PROGRESS NOTES
No complaints of pain.  Pt is independent. Pt on IV fluids and IV antibiotics. Droplet precautions for Rhinovirus. On tele is ST to NSR. Call light appropriately for assistance.

## 2022-05-05 NOTE — PROGRESS NOTES
[facility] PROGRESS NOTE      Identification/Summary: Alfred Rosario is a 36 year old male with a past medical history of HTN, paroxysmal atrial fibrillation, dyslipidemia, dilated cardiomyopathy, LEE ANN, morbid obesity who presented to the ED on 5/3/22 with cough for the past 2 weeks and increasing shortness of breath. CT of the chest showed left lower lobe consolidation compatible with pneumonia or aspiration. Respiratory panel positive for rhinovirus. WBC 34.8 on admission. Procalcitonin 28. Patient on IV zosyn and azithromycin. Blood cultures positive for gram negative coccobacilli.     Assessment and Plan:    Left lower lobe pneumonia  Rhinovirus with likely secondary bacterial infection  WBC 34.8--> 27.9  Procalcitonin 28 on admission  Chest CT showed left lower lobe consolidation  MRSA nasal swab negative  Sputum culture contaminated, patient not having much sputum. Will not recollect.   Continue IV piperacillin-tazobactam, azithro   Discontinue IV fluids as patient is drinking adequately by mouth     Sepsis, now markedly improved with normalization of lactic acid  Bacteremia with blood growing gram-negative organism  Secondary to pneumonia with white blood count 34,800, tachycardia and hypotension and acute kidney injury  Lactic acid has now normalized  Patient responded to fluid boluses  He reports he is feeling much improved since time of admission  Blood cultures positive for gram negative coccobacill  Continue IV piperacillin-tazobactam and IV azithromycin  Await further culture results     Chronic CHF  Not sure how compliant he is with medications  EF 2021 40%, echocardiogram today shows normal EF  BNP mildly elevated, likely falsely elevated due to obesity  Aspirin 81 mg daily  Lasix 20 mg daily prn with increase weight gain PTA  Increased carvedilol back to PTA dose of 37.5 mg (was cut to 12.5 mg yesterday due to hypotension but now most recent BP at 149/89)     Paroxysmal atrial  "fibrillation history.  Currently in sinus rhythm  Patient stopped taking rivaroxaban last year  Nocturnist restarted rivaroxaban  CHADS vasc score 2 (HTN and CHF)     Essential HTN  Patient's blood pressures low in ED, now back up  On valsartan 320 mg QD, carvedilol 37.5 mg BID PTA  Increased carvedilol back to PTA dose, no longer hypotensive  With blood pressures creeping up we will restart his valsartan at half the home dose     LEE ANN on CPAP  Morbid obesity, BMI 42     Abdominal pain improved from admission  Abdominal CT negative  Likely abdominal wall pain from coughing    Diet: Combination Diet Low Saturated Fat Na <2400mg Diet, No Caffeine Diet  DVT Prophylaxis:   Rivaroxaban  Code Status: Full Code    Anticipated possible discharge tomorrow once clinical improvement milestones are met.     Interval History/Subjective:  Patient reports feeling much better today. He denies shortness of breath, chest pain. Tightness/soreness of chest as well as abdominal pain from coughing are \"pretty much gone.\" Coughing a lot less. Denies any nausea or vomiting. Drinking a lot of fluids.     Physical Exam/Objective:  Vitals I/O   Temp: 98.4  F (36.9  C) Temp  Min: 97.8  F (36.6  C)  Max: 98.6  F (37  C)  Resp: 16 Resp  Min: 12  Max: 45  SpO2: 95 % SpO2  Min: 92 %  Max: 98 %  Pulse: 86 Pulse  Min: 85  Max: 103    No data recorded  BP: (!) 149/89   Systolic (24hrs), Av , Min:110 , Max:149   Diastolic (24hrs), Av, Min:60, Max:89    I/O last 3 completed shifts:  In: 5403.33 [P.O.:3840; I.V.:1563.33]  Out: 1675 [Urine:1675]     Body mass index is 44.96 kg/m .    General Appearance:  Alert, cooperative, no distress   Head:  Normocephalic, atraumatic   Eyes:  PERRL    Throat:  mucosa; moist   Neck: No JVD, thyromegaly   Lungs:   Decreased breath sounds in left lower, mild rales at left base. No wheezing   Heart:  Regular rate and rhythm, S1, S2 normal,no murmur   Abdomen:   Soft, non tender, non distended, bowel sounds " present, no guarding or rigidity   Extremities: No edema, no joint swelling   Skin: Skin color, texture, turgor normal, no rashes or lesions   Neurologic: Alert and oriented X 3, Moves all 4 extremities       Medications:   Personally Reviewed.    aspirin  81 mg Oral Daily     azithromycin  500 mg Intravenous Q24H     benzonatate  200 mg Oral TID     carvedilol  12.5 mg Oral BID w/meals     piperacillin-tazobactam  3.375 g Intravenous Q8H     rivaroxaban ANTICOAGULANT  20 mg Oral Daily with supper     sodium chloride (PF)  3 mL Intracatheter Q8H     [Held by provider] valsartan  320 mg Oral Daily       Data reviewed today: I personally reviewed all new medications, labs, imaging/diagnostics reports over the past 24 hours.     Labs:  Most Recent 3 CBC's:Recent Labs   Lab Test 22   WBC 27.9* 34.8* 26.7*   HGB 13.1* 14.5 15.0   MCV 90 91 87    257 301     Most Recent 3 BMP's:Recent Labs   Lab Test 22   * 135* 139   POTASSIUM 4.1 4.5 3.7   CHLORIDE 104 104 103   CO2 24 18* 21*   BUN 20 21 18   CR 1.16 1.54* 1.15   ANIONGAP 7 13 15   TAMEKA 8.9 8.8 9.6    108 109     Imaging:   Recent Results (from the past 24 hour(s))   Echocardiogram Complete   Result Value    LVEF  50-55%    Narrative    [id number]  [id number]  [id number]  [id number]^HOLLY[place] [address]     Name: KACI PIPER  MRN: [id number]  : 1985  Study Date: 2022 09:18 AM  Age: 36 yrs  Gender: Male  Patient Location: [facility]  Reason For Study: SOB  Ordering Physician: SAGAR BARRERA  Performed By: MIRANDA     BSA: 2.6 m2  Height: 72 in  Weight: 310 lb  HR: 102  BP: 114/58 mmHg  ______________________________________________________________________________  Procedure  Complete Portable Echo Adult. Definity (NDC #58460-101) given intravenously.  Technically difficult study.Extremely difficult  acoustic windows despite the  use of contrast for endcardial border definition. A prior study was performed  on 7/6/2021. Images are unavailable for comparison.  ______________________________________________________________________________  Interpretation Summary     1. Left ventricular size is normal. Moderate concentric increase in wall  thickness. Systolic function is low normal. The estimated left ventricular  ejection fraction is 50-55%.  2. Right ventricular size and systolic function are normal.  3. Mild left atrial enlargement.  4. No hemodynamically significant valvular abnormalities.  5. A prior study was performed on 7/6/2021. Images are unavailable for  comparison.  ______________________________________________________________________________  I      WMSI = 1.00     % Normal = 100     X - Cannot   0 -                      (2) - Mildly 2 -          Segments  Size  Interpret    Hyperkinetic 1 - Normal  Hypokinetic  Hypokinetic  1-2     small                                                     7 -          3-5      moderate  3 - Akinetic 4 -          5 -         6 - Akinetic Dyskinetic   6-14    large               Dyskinetic   Aneurysmal  w/scar       w/scar       15-16   diffuse     Left Ventricle  The left ventricle is normal in size. There is moderate concentric left  ventricular hypertrophy. The visual ejection fraction is 50-55%. Systolic  function is low normal. Diastolic function not assessed due to tachycardia. No  regional wall motion abnormalities noted.     Right Ventricle  Normal right ventricle size and systolic function.     Atria  The left atrium is mildly dilated. Right atrial size is normal.     Mitral Valve  Mitral valve leaflets appear normal. There is trace mitral regurgitation.  There is no mitral valve stenosis.     Tricuspid Valve  Tricuspid valve leaflets appear normal. There is trace tricuspid  regurgitation. Right ventricular systolic pressure could not be approximated  due to  inadequate tricuspid regurgitation. There is no tricuspid stenosis.     Aortic Valve  Aortic valve leaflets appear normal. The aortic valve is trileaflet. No aortic  regurgitation is present. No aortic stenosis is present.     Pulmonic Valve  The pulmonic valve is not well visualized. There is trace pulmonic valvular  regurgitation. There is no pulmonic valvular stenosis.     Vessels  The aorta root is normal. The thoracic aorta is normal. IVC diameter and  respiratory changes fall into an intermediate range suggesting an RA pressure  of 8 mmHg.     Pericardium  There is no pericardial effusion.     Rhythm  The rhythm was sinus tachycardia.     ______________________________________________________________________________  MMode/2D Measurements & Calculations  IVSd: 1.3 cm  LVIDd: 5.2 cm  LVIDs: 3.6 cm  LVPWd: 1.3 cm  FS: 29.9 %     LV mass(C)d: 277.3 grams  LV mass(C)dI: 108.0 grams/m2  Ao root diam: 3.9 cm  LA dimension: 4.2 cm  asc Aorta Diam: 3.9 cm  LA/Ao: 1.1  LVOT diam: 2.9 cm  LVOT area: 6.6 cm2  LA Volume Index (BP): 36.0 ml/m2  RWT: 0.50     Time Measurements  MM HR: 102.0 BPM     Doppler Measurements & Calculations  MV E max naeem: 91.4 cm/sec  MV dec time: 0.25 sec  Ao V2 max: 97.7 cm/sec  Ao max P.0 mmHg  Ao V2 mean: 68.9 cm/sec  Ao mean P.1 mmHg  Ao V2 VTI: 19.6 cm  MARGRET(I,D): 4.7 cm2  MARGRET(V,D): 5.9 cm2  LV V1 max PG: 3.0 mmHg  LV V1 max: 87.3 cm/sec  LV V1 VTI: 14.0 cm  SV(LVOT): 92.7 ml  SI(LVOT): 36.1 ml/m2  PA acc time: 0.10 sec  AV Naeem Ratio (DI): 0.89  MARGRET Index (cm2/m2): 1.8  E/E' av.7  Lateral E/e': 6.7  Medial E/e': 8.7     ______________________________________________________________________________  Report approved by: Dez Amaral 2022 12:12 PM               KASSANDRA Turner Student  Hospitalist  [facility]     Patient seen and examined  Patient discussed with physician assistant student  Agree with assessment and plan as outlined above    Jamie Martínez,  MD  [facility]

## 2022-05-05 NOTE — PLAN OF CARE
Problem: Plan of Care - These are the overarching goals to be used throughout the patient stay.    Goal: Optimal Comfort and Wellbeing  Outcome: Ongoing, Progressing     Problem: Plan of Care - These are the overarching goals to be used throughout the patient stay.    Goal: Readiness for Transition of Care  Outcome: Ongoing, Progressing     Problem: Airway Clearance Ineffective  Goal: Effective Airway Clearance  Outcome: Ongoing, Progressing  Intervention: Promote Airway Secretion Clearance  Recent Flowsheet Documentation  Taken 5/5/2022 0841 by Claudia Miranda RN  Cough And Deep Breathing: done independently per patient     Problem: Respiratory Compromise (Pneumonia)  Goal: Effective Oxygenation and Ventilation  Outcome: Ongoing, Progressing  Intervention: Promote Airway Secretion Clearance  Recent Flowsheet Documentation  Taken 5/5/2022 0841 by Claudia Miranda RN  Cough And Deep Breathing: done independently per patient   Goal Outcome Evaluation:        VSS on RA, uses CPAP while sleeping. Discontinued tele and IV fluid. Cardiac diet tolerating well. No c/o of pain, N/v. Up indep in room. Discharge pending improvement

## 2022-05-06 VITALS
WEIGHT: 315 LBS | HEART RATE: 88 BPM | TEMPERATURE: 99.1 F | DIASTOLIC BLOOD PRESSURE: 99 MMHG | SYSTOLIC BLOOD PRESSURE: 159 MMHG | OXYGEN SATURATION: 95 % | HEIGHT: 72 IN | RESPIRATION RATE: 18 BRPM | BODY MASS INDEX: 42.66 KG/M2

## 2022-05-06 PROCEDURE — 250N000013 HC RX MED GY IP 250 OP 250 PS 637: Performed by: FAMILY MEDICINE

## 2022-05-06 PROCEDURE — 258N000003 HC RX IP 258 OP 636: Performed by: INTERNAL MEDICINE

## 2022-05-06 PROCEDURE — 250N000011 HC RX IP 250 OP 636: Performed by: INTERNAL MEDICINE

## 2022-05-06 PROCEDURE — 250N000013 HC RX MED GY IP 250 OP 250 PS 637: Performed by: INTERNAL MEDICINE

## 2022-05-06 PROCEDURE — 99239 HOSP IP/OBS DSCHRG MGMT >30: CPT | Performed by: HOSPITALIST

## 2022-05-06 PROCEDURE — 999N000157 HC STATISTIC RCP TIME EA 10 MIN

## 2022-05-06 RX ADMIN — ASPIRIN 81 MG CHEWABLE TABLET 81 MG: 81 TABLET CHEWABLE at 09:01

## 2022-05-06 RX ADMIN — PIPERACILLIN AND TAZOBACTAM 3.38 G: 3; .375 INJECTION, POWDER, LYOPHILIZED, FOR SOLUTION INTRAVENOUS at 04:35

## 2022-05-06 RX ADMIN — CARVEDILOL 37.5 MG: 25 TABLET, FILM COATED ORAL at 09:04

## 2022-05-06 RX ADMIN — AZITHROMYCIN MONOHYDRATE 500 MG: 500 INJECTION, POWDER, LYOPHILIZED, FOR SOLUTION INTRAVENOUS at 02:03

## 2022-05-06 RX ADMIN — VALSARTAN 160 MG: 80 TABLET, FILM COATED ORAL at 09:04

## 2022-05-06 RX ADMIN — BENZONATATE 200 MG: 100 CAPSULE ORAL at 09:00

## 2022-05-06 RX ADMIN — GUAIFENESIN 10 ML: 100 SOLUTION ORAL at 04:35

## 2022-05-06 ASSESSMENT — ACTIVITIES OF DAILY LIVING (ADL)
ADLS_ACUITY_SCORE: 5

## 2022-05-06 NOTE — PROGRESS NOTES
Patient discharged home with wife via private car.  PIV removed.  After visit summary gone through and all questions answered.  Patient left with all personal belongings.

## 2022-05-06 NOTE — DISCHARGE SUMMARY
[facility] MEDICINE  DISCHARGE SUMMARY     Primary Care Physician: Nathanael Boswell  Admission Date: 5/3/2022   Discharge Provider: Joni Newman MD Discharge Date: 5/6/2022   Diet:   Active Diet and Nourishment Order   Procedures     Combination Diet Low Saturated Fat Na <2400mg Diet, No Caffeine Diet     Diet       Code Status: Full Code   Activity: DCACTIVITY: Activity as tolerated        Condition at Discharge: Good     REASON FOR PRESENTATION(See Admission Note for Details)   Alfred Rosario is a 36 year old male with a past medical history of HTN, paroxysmal atrial fibrillation, dyslipidemia, dilated cardiomyopathy, LEE ANN, morbid obesity who presented to the ED on 5/3/22 with cough for the past 2 weeks and increasing shortness of breath. CT of the chest showed left lower lobe consolidation compatible with pneumonia or aspiration.     PRINCIPAL & ACTIVE DISCHARGE DIAGNOSES     Active Problems:    Cough    Fever, unspecified fever cause    Sepsis, due to unspecified organism, unspecified whether acute organ dysfunction present (H)      PENDING LABS     Unresulted Labs Ordered in the Past 30 Days of this Admission     Date and Time Order Name Status Description    5/3/2022  9:50 PM Blood Culture Peripheral Blood Preliminary     5/3/2022  9:50 PM Blood Culture Peripheral Blood Preliminary             PROCEDURES ( this hospitalization only)          RECOMMENDATIONS TO OUTPATIENT PROVIDER FOR F/U VISIT     Follow-up Appointments     Follow-up and recommended labs and tests      Follow up with primary care provider, Nathanael Boswell, within 7 days for   hospital follow- up.             DISPOSITION     Home    SUMMARY OF HOSPITAL COURSE:      Alfred Rosario is a 36 year old male with a past medical history of HTN, paroxysmal atrial fibrillation, dyslipidemia, dilated cardiomyopathy, LEE ANN, morbid obesity who presented to the ED on 5/3/22 with cough for the past 2 weeks and increasing  shortness of breath.     Started on IV zosyn and azithromycin as CT of the chest showed left lower lobe consolidation compatible with pneumonia or aspiration. Respiratory panel positive for rhinovirus. WBC 34.8 on admission. Procalcitonin 28. Patient improved clinically on IV zosyn and azithromycin. Blood cultures positive for gram negative coccobacilli which speciated as haemophilus influenzae consistent with community associated pneumonia. Transitioned to PO augmentin at discharge to complete total 10-day course given gram-negative bacteremia. Eliquis was restarted that he was previously prescribed but ran out of prescription; CHADs-VASc was 2 and patient wished to restart anticoagulation after discussion with [facility]. PCP follow-up.     Discharge Medications with Med changes:     Current Discharge Medication List      START taking these medications    Details   amoxicillin-clavulanate (AUGMENTIN) 875-125 MG tablet Take 1 tablet by mouth 2 times daily for 7 days  Qty: 14 tablet, Refills: 0    Associated Diagnoses: Community acquired bacterial pneumonia; Gram-negative bacteremia      rivaroxaban ANTICOAGULANT (XARELTO) 20 MG TABS tablet Take 1 tablet (20 mg) by mouth daily (with dinner) Recommend starting this until follow-up with your primary care physician.  Qty: 30 tablet, Refills: 0    Comments: Resumption of previous medication he apparently ran out of.  Associated Diagnoses: Paroxysmal A-fib (H)         CONTINUE these medications which have NOT CHANGED    Details   carvedilol (COREG) 25 MG tablet TAKE 1.5 TABLETS (37.5 MG) BY MOUTH 2 TIMES DAILY (WITH MEALS)  Qty: 270 tablet, Refills: 1    Associated Diagnoses: Hypertensive urgency      docosahexaenoic acid-epa 120-180 mg cap [DOCOSAHEXAENOIC ACID--180 MG CAP] Take 2 g by mouth daily.      furosemide (LASIX) 20 MG tablet TAKE 1 TABLET (20 MG) BY MOUTH DAILY AS NEEDED (INCREASE WT GAIN)  Qty: 90 tablet, Refills: 1    Associated Diagnoses: Acute systolic  congestive heart failure (H)      melatonin 5 MG tablet Take 5 mg by mouth nightly as needed for sleep      multivitamin (MEN'S MULTI-VITAMIN) per tablet [MULTIVITAMIN (MEN'S MULTI-VITAMIN) PER TABLET] Take 1 tablet by mouth daily.      valsartan (DIOVAN) 320 MG tablet TAKE 1 TABLET BY MOUTH EVERY DAY  Qty: 90 tablet, Refills: 1    Associated Diagnoses: Hypertension, uncontrolled         STOP taking these medications       aspirin 81 mg chewable tablet Comments:   Reason for Stopping:                     Rationale for medication changes:      Augmentin for CAP PNA with GN bacteremia        Consults   PHARMACY TO DOSE VANCO    Immunizations given this encounter     Most Recent Immunizations   Administered Date(s) Administered     COVID-19,PF,Main 11/18/2021     TDAP Vaccine (Boostrix) 07/18/2016           Anticoagulation Information      Recent INR results: No results for input(s): INR in the last 168 hours.  Warfarin doses (if applicable) or name of other anticoagulant: Restarted on home Eliquis DOAC      SIGNIFICANT IMAGING FINDINGS     Results for orders placed or performed during the hospital encounter of 05/03/22   XR Chest 2 Views    Impression    IMPRESSION: Negative chest. Resolution of the previous infiltrates.   CT Abdomen Pelvis w Contrast    Impression    IMPRESSION:   1.  Left lower lobe pneumonia versus aspiration, recommend follow-up to resolution.  2.  Normal appendix. No obstruction, colitis, or diverticulitis.  3.  Mild atherosclerotic vascular calcifications.   US Lower Extremity Venous Duplex Bilateral    Impression    IMPRESSION:  1.  No deep venous thrombosis in the bilateral lower extremities.   CT Chest w/o Contrast    Impression    IMPRESSION:   1.  Left lower lobe consolidation compatible with pneumonia or aspiration. Follow-up to resolution recommended.     Echocardiogram Complete   Result Value Ref Range    LVEF  50-55%        SIGNIFICANT LABORATORY FINDINGS     Most Recent 3  CBC's:Recent Labs   Lab Test 05/05/22 0622 05/04/22  0629 05/03/22 2016   WBC 27.9* 34.8* 26.7*   HGB 13.1* 14.5 15.0   MCV 90 91 87    257 301     Most Recent 3 BMP's:Recent Labs   Lab Test 05/05/22 0622 05/04/22  0629 05/03/22 2016   * 135* 139   POTASSIUM 4.1 4.5 3.7   CHLORIDE 104 104 103   CO2 24 18* 21*   BUN 20 21 18   CR 1.16 1.54* 1.15   ANIONGAP 7 13 15   TAMEKA 8.9 8.8 9.6    108 109     Most Recent 2 LFT's:Recent Labs   Lab Test 05/04/22 0629   AST 21   ALT 23   ALKPHOS 53   BILITOTAL 1.0     Most Recent 3 INR's:Recent Labs   Lab Test 07/06/21  1310   INR 0.99       Discharge Orders        Reason for your hospital stay    Community associated pneumonia.     Follow-up and recommended labs and tests    Follow up with primary care provider, Nathanael Boswell, within 7 days for hospital follow- up.     Activity    Your activity upon discharge: activity as tolerated and no driving for today     Diet    Follow this diet upon discharge: Orders Placed This Encounter      Combination Diet Low Saturated Fat Na <2400mg Diet, No Caffeine Diet         Examination   Physical Exam   Temp:  [98.4  F (36.9  C)-99.8  F (37.7  C)] 98.9  F (37.2  C)  Pulse:  [] 88  Resp:  [16-18] 18  BP: (134-165)/(76-98) 150/80  SpO2:  [93 %-95 %] 93 %  Wt Readings from Last 1 Encounters:   05/06/22 148.1 kg (326 lb 6.4 oz)       GENERAL:  Alert, appears comfortable, in no acute distress, appears stated age   HEAD:  Normocephalic, without obvious abnormality, atraumatic   EYES:  PERRL, conjunctiva/corneas clear, no scleral icterus, EOM's intact   NOSE: Nares normal, septum midline, mucosa normal, no drainage   THROAT: Lips, mucosa, and tongue normal; teeth and gums normal, mouth moist   NECK: Supple, symmetrical, trachea midline   BACK:   Symmetric, no curvature, ROM normal   LUNGS:   Clear to auscultation bilaterally anteriorly; poster LLL basilar rhonchi, no rales, or wheezing, symmetric chest rise on inhalation,  respirations unlabored   CHEST WALL:  No tenderness or deformity   HEART:  Regular rate and rhythm, S1 and S2 normal, no murmur, rub, or gallop    ABDOMEN:   Soft, non-tender, bowel sounds active all four quadrants, no masses, no organomegaly, no rebound or guarding   EXTREMITIES: Extremities normal, atraumatic, no cyanosis or edema    SKIN: Dry to touch, no exanthems in the visualized areas   NEURO: Alert, oriented x 4, moves all four extremities freely/spontaneously   PSYCH: Cooperative, behavior is appropriate          Please see EMR for more detailed significant labs, imaging, consultant notes etc.    I, Joni Newman MD, personally saw the patient today and spent greater than 30 minutes discharging this patient.    Joni Newman MD  Internal Medicine  [facility]  Phone: #[phone]    CC:Nathanael Boswell

## 2022-05-06 NOTE — PLAN OF CARE
Problem: Respiratory Compromise (Pneumonia)  Goal: Effective Oxygenation and Ventilation  Intervention: Promote Airway Secretion Clearance  Recent Flowsheet Documentation  Taken 5/5/2022 1705 by Mansoor Spivey RN  Cough And Deep Breathing: done independently per patient     Problem: Infection (Pneumonia)  Goal: Resolution of Infection Signs and Symptoms  Intervention: Prevent Infection Progression  Recent Flowsheet Documentation  Taken 5/5/2022 1705 by Mansoor Spivey RN  Isolation Precautions: droplet precautions maintained     Problem: Plan of Care - These are the overarching goals to be used throughout the patient stay.    Goal: Readiness for Transition of Care  Outcome: Ongoing, Progressing   Goal Outcome Evaluation: The patient's lung sounds were clear, but diminished. The patient did not require any supplemental oxygen during the evening shift, but is using his CPAP at night, though this is baseline. The patient was able to take a shower, and he is independent in the room. The patient continues to receive IV zosyn, and he was vitally stable.

## 2022-05-06 NOTE — PLAN OF CARE
Problem: Infection (Pneumonia)  Goal: Resolution of Infection Signs and Symptoms  Outcome: Ongoing, Progressing  Intervention: Prevent Infection Progression  Recent Flowsheet Documentation  Taken 5/5/2022 2345 by Cleve Sinclair, RN  Isolation Precautions: droplet precautions maintained   Goal Outcome Evaluation:      Pt afebrile.   BC positive.   Human Rhinovirus positive - continue droplet precautions  Continue with IV abx.       Problem: Respiratory Compromise (Pneumonia)  Goal: Effective Oxygenation and Ventilation  Outcome: Ongoing, Progressing  Intervention: Promote Airway Secretion Clearance  Recent Flowsheet Documentation  Taken 5/5/2022 2345 by Cleve Sinclair, RN  Cough And Deep Breathing: done independently per patient     Pt denies any SOB even with activity.   Lung sounds are diminished and course in the LLL  Pt on RA sating 93% or above.   Pt has productive cough - using IS can get up to 2500  Utilize prn guaffenasin to ease cough at night.

## 2022-05-06 NOTE — PROGRESS NOTES
Care Management Discharge Note    Discharge Date: 05/06/2022       Discharge Disposition: Home    Discharge Services: None    Discharge DME: None    Discharge Transportation: family or friend will provide (wife to transport)    Private pay costs discussed: None indicated.     PAS Confirmation Code: N/A  Patient/family educated on Medicare website which has current facility and service quality ratings:  (N/A)    Education Provided on the Discharge Plan:  Patient confirms plan to discharge home with wife. No CM needs. AVS per bedside RN.   Persons Notified of Discharge Plans: patient   Patient/Family in Agreement with the Plan: yes    Handoff Referral Completed: No CM needs. Wife to transport home.   Additional Information:  Chart reviewed. CM spoke with patient via phone due to droplet precautions. He denies needs from CM. States wife will be picking up from hospital about 11am.     [facility] referral sent per protocol.     Sharmaine Preciado RN

## 2022-05-09 ENCOUNTER — PATIENT OUTREACH (OUTPATIENT)
Dept: CARE COORDINATION | Facility: CLINIC | Age: 37
End: 2022-05-09
Payer: COMMERCIAL

## 2022-05-09 NOTE — LETTER
[facility] CARE COORDINATION  [address] [facility] [address]    May 11, 2022    Alfred Rosario  [address]   [facility] [address]      Dear Alfred,    I am a clinic community health worker who works with Nathanael Boswell MD at [facility]. I have been trying to reach you recently to introduce Clinic Care Coordination and to see if there was anything I could assist you with.  Below is a description of clinic care coordination and how I can further assist you.      The clinic care coordination team is made up of a registered nurse,  and community health worker who understand the health care system. The goal of clinic care coordination is to help you manage your health and improve access to the health care system in the most efficient manner. The team can assist you in meeting your health care goals by providing education, coordinating services, strengthening the communication among your providers and supporting you with any resource needs.    Please feel free to contact me at [phone] with any questions or concerns. We are focused on providing you with the highest-quality healthcare experience possible and that all starts with you.     Sincerely,     Sheridan Lopez  Community Health Worker  [facility]  Clinic Care Coordination   Office: [phone]

## 2022-05-09 NOTE — PROGRESS NOTES
Clinic Care Coordination Contact  [facility]/Voicemail    Clinical Data: Care Coordinator Outreach  Outreach attempted x 1.  Left message on patient's voicemail with call back information and requested return call.  Plan: Care Coordinator will try to reach patient again in 1-2 business days.    TCM Referral  5/3/2022 - 5/6/2022 (3 days)  [facility]    [facility]  [facility]  Clinic Care Coordination   Office: [phone]

## 2022-05-11 NOTE — PROGRESS NOTES
Clinic Care Coordination Contact  [facility]/Voicemail    Clinical Data: Care Coordinator Outreach  Outreach attempted x 2.  Left message on patient's voicemail with call back information and requested return call.  Plan: Care Coordinator sent care coordination introduction letter on 5/11/22 via [employer]. Care Coordinator will do no further outreaches at this time.    Sheridan Lopez  [facility] Health Worker  [facility] Care Coordination   Office: [phone]

## 2022-05-13 ENCOUNTER — OFFICE VISIT (OUTPATIENT)
Dept: FAMILY MEDICINE | Facility: CLINIC | Age: 37
End: 2022-05-13
Payer: COMMERCIAL

## 2022-05-13 VITALS
WEIGHT: 307 LBS | OXYGEN SATURATION: 99 % | RESPIRATION RATE: 18 BRPM | SYSTOLIC BLOOD PRESSURE: 134 MMHG | TEMPERATURE: 99 F | BODY MASS INDEX: 41.64 KG/M2 | HEART RATE: 85 BPM | DIASTOLIC BLOOD PRESSURE: 90 MMHG

## 2022-05-13 DIAGNOSIS — J18.9 PNEUMONIA DUE TO INFECTIOUS ORGANISM, UNSPECIFIED LATERALITY, UNSPECIFIED PART OF LUNG: Primary | ICD-10-CM

## 2022-05-13 DIAGNOSIS — I10 HYPERTENSION, UNCONTROLLED: ICD-10-CM

## 2022-05-13 DIAGNOSIS — E66.01 MORBID OBESITY (H): ICD-10-CM

## 2022-05-13 PROBLEM — I50.21 ACUTE SYSTOLIC CONGESTIVE HEART FAILURE (H): Status: RESOLVED | Noted: 2021-07-08 | Resolved: 2022-05-13

## 2022-05-13 PROBLEM — R05.9 COUGH: Status: RESOLVED | Noted: 2022-05-03 | Resolved: 2022-05-13

## 2022-05-13 PROBLEM — J81.0 ACUTE PULMONARY EDEMA (H): Status: RESOLVED | Noted: 2021-07-06 | Resolved: 2022-05-13

## 2022-05-13 PROBLEM — R50.9 FEVER, UNSPECIFIED FEVER CAUSE: Status: RESOLVED | Noted: 2022-05-03 | Resolved: 2022-05-13

## 2022-05-13 PROBLEM — A41.9 SEPSIS, DUE TO UNSPECIFIED ORGANISM, UNSPECIFIED WHETHER ACUTE ORGAN DYSFUNCTION PRESENT (H): Status: RESOLVED | Noted: 2022-05-03 | Resolved: 2022-05-13

## 2022-05-13 LAB
BACTERIA BLD CULT: ABNORMAL
BACTERIA BLD CULT: ABNORMAL

## 2022-05-13 PROCEDURE — 99495 TRANSJ CARE MGMT MOD F2F 14D: CPT | Performed by: FAMILY MEDICINE

## 2022-05-13 RX ORDER — IBUPROFEN 800 MG/1
TABLET, FILM COATED ORAL
COMMUNITY
Start: 2021-11-06 | End: 2022-06-07 | Stop reason: SINTOL

## 2022-05-13 RX ORDER — VALSARTAN 320 MG/1
320 TABLET ORAL DAILY
Qty: 90 TABLET | Refills: 3 | Status: SHIPPED | OUTPATIENT
Start: 2022-05-13 | End: 2023-06-15

## 2022-05-13 RX ORDER — CARVEDILOL 25 MG/1
37.5 TABLET ORAL 2 TIMES DAILY WITH MEALS
Qty: 270 TABLET | Refills: 3 | Status: SHIPPED | OUTPATIENT
Start: 2022-05-13 | End: 2023-06-14

## 2022-05-17 LAB
BACTERIA BLD CULT: ABNORMAL
BACTERIA BLD CULT: ABNORMAL

## 2022-05-17 NOTE — PROGRESS NOTES
Assessment & Plan     Pneumonia due to infectious organism, unspecified laterality, unspecified part of lung    Is doing better, now, off of the antibiotic and feels that he is getting back to normal.  He continues on the anticoagulant as he has had some history of atrial fibrillation in the past.  He continues with the other medications as noted.    Hypertension, uncontrolled    - valsartan (DIOVAN) 320 MG tablet; Take 1 tablet (320 mg) by mouth daily    Morbid obesity (H)    - Comprehensive Weight Management; Future    Review of external notes as documented elsewhere in note  Prescription drug management         BMI:   Estimated body mass index is 41.64 kg/m  as calculated from the following:    Height as of 5/3/22: 1.829 m (6').    Weight as of this encounter: 139.3 kg (307 lb).   Weight management plan: Patient referred to endocrine and/or weight management specialty        No follow-ups on file.    Nathanael Boswell MD  [facility] EDITA Simon is a 37 year old who presents for the following health issues     HPI       Hospital Follow-up Visit:    Hospital/Nursing Home/ Rehab Facility: [facility]  Date of Admission: 5/3/2022  Date of Discharge: 5/6/2022  Reason(s) for Admission: pneumonia, sepsis      Was your hospitalization related to COVID-19? No   Problems taking medications regularly:  None  Medication changes since discharge: None  Problems adhering to non-medication therapy:  None    Summary of hospitalization:  [facility] discharge summary reviewed  Diagnostic Tests/Treatments reviewed.  Follow up needed: none  Other Healthcare Providers Involved in Patient s Care:         None  Update since discharge: improved. Post Discharge Medication Reconciliation: discharge medications reconciled, continue medications without change.  Plan of care communicated with patient            Review of Systems   Constitutional, HEENT,  cardiovascular, pulmonary, gi and gu systems are negative, except as otherwise noted.      Objective    BP (!) 134/90 (BP Location: Left arm, Patient Position: Sitting, Cuff Size: Adult Large)   Pulse 85   Temp 99  F (37.2  C) (Oral)   Resp 18   Wt 139.3 kg (307 lb)   SpO2 99%   BMI 41.64 kg/m    Body mass index is 41.64 kg/m .  Physical Exam   GENERAL: healthy, alert and no distress  NECK: no adenopathy, no asymmetry, masses, or scars and thyroid normal to palpation  RESP: lungs clear to auscultation - no rales, rhonchi or wheezes  CV: regular rate and rhythm, normal S1 S2, no S3 or S4, no murmur, click or rub, no peripheral edema and peripheral pulses strong  ABDOMEN: soft, nontender, no hepatosplenomegaly, no masses and bowel sounds normal  MS: no gross musculoskeletal defects noted, no edema

## 2022-05-31 NOTE — PROGRESS NOTES
Alfred Rosario is 37 year old  male who presents for a billable video visit today.    How would you like to obtain your AVS? MyChart  If dropped from the video visit, the video invitation should be resent by: Text to cell phone: [phone]   Will anyone else be joining your video visit? No      Video Start Time: 10:30AM    Are there any specific questions or needs that you would like addressed at your visit today? New 24 wk program        New Medical Weight Management Consult    PATIENT:  Alfred Rosario  MRN:         [id number]  :         1985  STEVEN:         2022    Dear Dr. Boswell,    I had the pleasure of seeing your patient, Alfred Rosario. Full intake/assessment was done to determine barriers to weight loss success and develop a treatment plan. Alfred Rosario is a 37 year old male interested in treatment of medical problems associated with excess weight.  He is 6' tall and 307# and his BMI is 42.04  Mr. Rosario was diagnosed with atrial fibrillation and had cardioversion, subsequent CPAP for his previously undiagnosed sleep apnea and is on anticoagulation. He had a recent pneumonia and CT mentioned mild atherosclerotic vascular calcifications. His lipid panel in the remote past () showed low HDL 35 and mildly elevated . His triglycerides were normal. He has HTN and monitors his blood pressures at home-reports 120's systolic and high 80's diastolic. He has never smoked and he uses his treadmill 2X/wk at an incline for 30 minutes. [place] Half of his work day is in the office. He had an A1c of 5.8 last year. His renal studies have been normal alternating with GFRs in the 50's with creatinine 1.4-1.5 range.       ASSESSMENT  Snacking on cookies, pastas, bagel  Stress Eating  A little low on sleep chronically with weekend catch up  High Starch diet  Night Time eating    PLAN:  Discussed the importance of blood  "pressure, cholesterol and blood sugar control, knowing that at this young age he has early atherosclerotic changes despite being a never smoker and otherwise relatively healthy. This can be considered an advantage-having this information early and preventing future buildup with excellent preventive care. He will likely start a statin. Working with the dietitian will be helpful. His wife is also participating with the 24 week plan.  His father had stents placed.     Labs including A1c, Lipid panel, TSH, B-12, CMP, CBC  24 week program  Wellbutrin/Naltrexone combination  Discussed GLP-1 RA. He will read about them and consider  Metformin might be helpful long term for weight loss maintenance    He has the following co-morbidities:       6/1/2022   I have the following health issues associated with obesity: High Blood Pressure, Sleep Apnea   I have the following symptoms associated with obesity: None of the above       Patient Goals 6/1/2022   I am interested in having a healthier weight to diminish current health problems: Yes   I am interested in having a healthier weight in order to prevent future health problems: Yes   I am interested in having a healthier weight in order to have a future surgery: No       Referring Provider 6/1/2022   Please name the provider who referred you to Medical Weight Management.  If you do not know, please answer: \"I Don't Know\". Dr Nathanael Boswell       Weight History 6/1/2022   How concerned are you about your weight? Very Concerned   Would you describe your weight gain as gradual? Yes   I became overweight: As a Child   The following factors have contributed to my weight gain:  Eating Too Much, Lack of Exercise, Genetic (Runs in the Family), Stress   I have tried the following methods to lose weight: Watching Portions or Calories, Exercise   My lowest weight since age 17 was: 189   My highest weight since age 17 was: 315   The most weight I have ever lost was: (lbs) 80   I have the " following family history of obesity/being overweight:  Many of my relatives are overweight   Has anyone in your family had weight loss surgery? No   How has your weight changed over the last year?  Gained   How many pounds? 15-20       Diet Recall Review with Patient 6/1/2022   Do you typically eat breakfast? No   Do you typically eat lunch? Yes   If you do eat lunch, what types of food do you typically eat?  Chicken pasta rice   Do you typically eat supper? Yes   If you do eat supper, what types of food do you typically eat? meats and starches and some veggies   Do you typically eat snacks? Yes   If you do snack, what types of food do you typically eat? Cheese, chips   Do you like vegetables?  Yes   Do you drink water? Yes   How many glasses of juice do you drink in a typical day? 0   How many of glasses of milk do you drink in a typical day? 2   How many 8oz glasses of sugar containing drinks such as Chad-Aid/sweet tea do you drink in a day? 0   How many cans/bottles of sugar pop/soda/tea/sports drinks do you drink in a day? 0   How many cans/bottles of diet pop/soda/tea or sports drink do you drink in a day? 2   How often do you have a drink of alcohol? Never       Eating Habits 6/1/2022   Generally, my meals include foods like these: bread, pasta, rice, potatoes, corn, crackers, sweet dessert, pop, or juice. Almost Everyday   Generally, my meals include foods like these: fried meats, brats, burgers, french fries, pizza, cheese, chips, or ice cream. A Few Times a Week   Eat fast food (like McDonalds, Burger Enmanuel, Taco Bell). Less Than Weekly   Eat at a buffet or sit-down restaurant. Less Than Weekly   Eat most of my meals in front of the TV or computer. Less Than Weekly   Often skip meals, eat at random times, have no regular eating times. Almost Everyday   Rarely sit down for a meal but snack or graze throughout.  Everyday   Eat extra snacks between meals. Almost Everyday   Eat most of my food at the end of the  day. A Few Times a Week   Eat in the middle of the night or wake up at night to eat. Never   Eat extra snacks to prevent or correct low blood sugar. Never   Eat to prevent acid reflux or stomach pain. Never   Worry about not having enough food to eat. Never   Have you been to the food shelf at least a few times this year? No   I eat when I am depressed. Less Than Weekly   I eat when I am stressed. Everyday   I eat when I am bored. Once a Week   I eat when I am anxious. Everyday   I eat when I am happy or as a reward. Almost Everyday   I feel hungry all the time even if I just have eaten. Less Than Weekly   Feeling full is important to me. Less Than Weekly   I eat/snack without noticing that I am eating. Less Than Weekly   I eat when I am preparing the meal. Once a Week   I eat more than usual when I see others eating. Less Than Weekly   I have trouble not eating sweets, ice cream, cookies, or chips if they are around the house. Everyday   I think about food all day. Once a Week   What foods, if any, do you crave? Sweets/Candy/Chocolate       Amount of Food 6/1/2022   I make myself vomit what I have eaten or use laxatives to get rid of food. Never   I eat a large amount of food, like a loaf of bread, a box of cookies, a pint/quart of ice cream, all at once. Monthly   I eat a large amount of food even when I am not hungry. Monthly   I eat rapidly. Everyday   I eat alone because I feel embarrassed and do not want others to see how much I have eaten. Almost Everyday   I eat until I am uncomfortably full. Monthly   I feel bad, disgusted, or guilty after I overeat. Almost Everyday   I make myself vomit what I have eaten or use laxatives to get rid of food. Never       Activity/Exercise History 6/1/2022   How much of a typical 12 hour day do you spend sitting? Half the Day   How much of a typical 12 hour day do you spend lying down? Less Than Half the Day   How much of a typical day do you spend walking/standing? Half the  Day   How many hours (not including work) do you spend on the TV/Video Games/Computer/Tablet/Phone? 2-3 Hours   What keeps you from being more active? Too tired   How many total minutes do you spend doing some activity for the purpose of exercising when you exercise? 15-30 Minutes       PAST MEDICAL HISTORY:  Past Medical History:   Diagnosis Date     Acute respiratory failure with hypoxia (H) 07/06/2021     Atrial fibrillation (H) 2016    cardioversion. and CPAP     Congestive heart failure (H)      Dyslipidemia      Hypertension      Hypertensive urgency 07/06/2021     Metabolic syndrome      Morbid obesity with BMI of 40.0-44.9, adult (H)      Obstructive sleep apnea syndrome      Pre-diabetes        Work/Social History Reviewed With Patient 6/1/2022   My employment status is: Full-Time   My job is: [employer]    How much of your job is spent on the computer or phone? 50%   How many hours do you spend commuting to work daily?  1   What is your marital status? /In a Relationship   If in a relationship, is your significant other overweight? Yes   Do you have children? Yes   If you have children, are they overweight? No   Who do you live with?  spouse and children   Are they supportive of your health goals? Yes   Who does the food shopping?  both wife and I       Mental Health History Reviewed With Patient 6/1/2022   Have you ever been physically or sexually abused? No   How often in the past 2 weeks have you felt little interest or pleasure in doing things? Not at all   Over the past 2 weeks how often have you felt down, depressed, or hopeless? Not at all       Sleep History Reviewed With Patient 6/1/2022   How many hours do you sleep at night? 6   Do you think that you snore loudly or has anybody ever heard you snore loudly (louder than talking or so loud it can be heard behind a shut door)? No   Has anyone seen or heard you stop breathing during your sleep? Yes   Do you often feel tired,  fatigued, or sleepy during the day? No   Do you have a TV/Computer in your bedroom? No       MEDICATIONS:   Current Outpatient Medications   Medication Sig Dispense Refill     buPROPion (WELLBUTRIN SR) 150 MG 12 hr tablet Take 1 tablet (150 mg) by mouth 2 times daily 180 tablet 3     naltrexone (DEPADE/REVIA) 50 MG tablet Take 1 tablet (50 mg) by mouth daily 90 tablet 3     carvedilol (COREG) 25 MG tablet Take 1.5 tablets (37.5 mg) by mouth 2 times daily (with meals) 270 tablet 3     docosahexaenoic acid-epa 120-180 mg cap [DOCOSAHEXAENOIC ACID--180 MG CAP] Take 2 g by mouth daily.       furosemide (LASIX) 20 MG tablet TAKE 1 TABLET (20 MG) BY MOUTH DAILY AS NEEDED (INCREASE WT GAIN) 90 tablet 1     ibuprofen (ADVIL/MOTRIN) 800 MG tablet        melatonin 5 MG tablet Take 5 mg by mouth nightly as needed for sleep       multivitamin (MEN'S MULTI-VITAMIN) per tablet [MULTIVITAMIN (MEN'S MULTI-VITAMIN) PER TABLET] Take 1 tablet by mouth daily.       rivaroxaban ANTICOAGULANT (XARELTO) 20 MG TABS tablet Take 1 tablet (20 mg) by mouth daily (with dinner) Recommend starting this until follow-up with your primary care physician. 30 tablet 0     valsartan (DIOVAN) 320 MG tablet Take 1 tablet (320 mg) by mouth daily 90 tablet 3       ALLERGIES:   No Known Allergies    PHYSICAL EXAM:  Ht 1.829 m (6')   Wt 139.3 kg (307 lb)   BMI 41.64 kg/m    Pleasant and in no distress  Normal respiratory effort  Alert and oriented  Euthymic    FOLLOW-UP:   Labs -am draw for T and fasting for Lipids.  Dietitian monthly  Health  weekly  [employer] exercise program  August f/u naltrexone/wellbutrin/consider GLP-1 RA    TIME: 60 min spent on evaluation, management, counseling, education, & motivational interviewing with greater than 50 % of the total time was spent on counseling and coordinating care    Sincerely,    Bailey Knox MD          Video-Visit Details    Type of service:  Video Visit    Video End Time (time  video stopped): 11:30  Originating Location (pt. Location): Home    Distant Location (provider location):  [facility]     Platform used for Video Visit: Osorio

## 2022-06-01 ENCOUNTER — VIRTUAL VISIT (OUTPATIENT)
Dept: SURGERY | Facility: CLINIC | Age: 37
End: 2022-06-01
Payer: COMMERCIAL

## 2022-06-01 VITALS — HEIGHT: 72 IN | BODY MASS INDEX: 41.58 KG/M2 | WEIGHT: 307 LBS

## 2022-06-01 DIAGNOSIS — E66.01 MORBID OBESITY (H): ICD-10-CM

## 2022-06-01 PROBLEM — K76.0 HEPATIC STEATOSIS: Status: ACTIVE | Noted: 2022-06-01

## 2022-06-01 PROBLEM — E88.810 METABOLIC SYNDROME: Status: ACTIVE | Noted: 2022-06-01

## 2022-06-01 PROBLEM — E78.5 DYSLIPIDEMIA: Status: ACTIVE | Noted: 2022-06-01

## 2022-06-01 PROBLEM — R73.03 PRE-DIABETES: Status: ACTIVE | Noted: 2022-06-01

## 2022-06-01 PROCEDURE — 99215 OFFICE O/P EST HI 40 MIN: CPT | Mod: GT | Performed by: FAMILY MEDICINE

## 2022-06-01 RX ORDER — NALTREXONE HYDROCHLORIDE 50 MG/1
50 TABLET, FILM COATED ORAL DAILY
Qty: 90 TABLET | Refills: 3 | Status: SHIPPED | OUTPATIENT
Start: 2022-06-01 | End: 2022-10-12

## 2022-06-01 RX ORDER — BUPROPION HYDROCHLORIDE 150 MG/1
150 TABLET, EXTENDED RELEASE ORAL 2 TIMES DAILY
Qty: 180 TABLET | Refills: 3 | Status: SHIPPED | OUTPATIENT
Start: 2022-06-01 | End: 2022-10-12

## 2022-06-01 NOTE — PATIENT INSTRUCTIONS
[facility] Bariatric Basics    Remember to:    -Eat 3 meals a day (not 2, not 5) Chew your food well/SLOW down  -Eat your protein first  -Be a water drinker/Minize liquid calories (no regular pop, no juice) skim or 1% milk OK  -Sleep 7-8 hours each night. Address sleep if problematic  -Stress management is important. Address if problematic  -Move-8000 steps daily Muscle: maintain your muscle mass (strength training 2X/wk)  -Wheat, not white (bread, pasta, crackers, nik, bagels, tortillas, rice)  -Limit restaurant, cafeteria, take out, drive through to 2 times per week or less  -Minimize caffeine, alcohol, and night-time snacking  -Consider keeping a food diary (i.e. My Fitness Pal, Lose It, or other food tracker)  -Follow up with the dietitian    MEDICATIONS FOR WEIGHT LOSS    PHENTERMINE (Adipex): approved in 1959 for appetite suppression.  It has stimulant effects and cannot be used with Ritalin, Concerta, or other stimulants.  It is not addictive although it's chemically related to amphetamines.  Amphetamines are addictive. The most common side effects are dry mouth, increased energy and concentration, increased pulse, and constipation.  You should not take phentermine if you have glaucoma, hyperthyroidism, or uncontrolled/untreated hypertension.  $24-$30 for 90 tablets    PHENDIMETRAZINE (Bontril): Appetite suppressant/sympathomimetic.  Controlled substance.  Side effects and contraindications similar to phentermine.  $45-$60 for 3 month supply    TOPIRAMATE (Topamax): Anti-seizure medication, also used to prevent migraines.  Side effects include paresthesia, glaucoma, altered concentration, attention difficulties, memory and speech problems, metabolic acidosis, depression, increase in body temperature and decrease sweating, kidney stones, and weight loss.  Do not take Topamax while taking Depakote as this can cause high ammonia levels.  You must have reliable birth control as Topamax can cause birth defects.   Discontinue slowly to avoid seizure.  Insurance usually covers Topiramate.    QSYMIA (Phentermine + Topamax):  See above information about phentermine and Topamax.  Most common side effects are paresthesia, dizziness, distortion of taste, insomnia, constipation, and dry mouth.  $150-$220 per month    DIETHYLPROPION (Tenuate): Sympathomimetic amine.  Appetite suppressant.  Doses 25 mg before meals or 75 mg per day.  Most common side effects are hypertension, palpitations, EKG changes, and increased seizures in epileptics.  There can be a possible adverse reaction with alcohol.  $70-$90 per 3 months    XENICAL(Orlistat) (Samy OTC): Approved in 1999.  A fat-blocker.  It reduces absorption of fat by approximately 30%.  It has beneficial effects on lipid levels.  Side effects include diarrhea, abdominal cramping, fecal incontinence, oily spotting, and flatus with discharge.  Side effects are minimized if the patient limits their dietary fat to no more than 30% of their diet.  Patients must take a multivitamin daily to avoid vitamin D, E, A, and K deficiency.  $120 per month    CONTRAVE (naltrexone/bupropion): Approved in 2014.  It is a combination pill including an opioid receptor blocker and a long-standing antidepressant.  Most common side effects include nausea, constipation, headache, vomiting, dizziness, trouble sleeping, dry mouth, and diarrhea.  With all antidepressants watch for mood changes and suicide ideation.  Bupropion has been known to lower the seizure threshold in those prone to seizures.  It should not be used in a patient with a recent history of bulimia. It has been associated with liver damage from taking higher than recommended doses.  Do not use countrave if you have taken opioid medications or opioid street drugs in the past 7-10 days, if you are currently on opioids, methadone, or if you are pregnant.  Do not use contrave if you have recently stopped using alcohol or benzodiazepines.  Taper off  contrave slowly.  Dosing: titrate up to 2 tablets twice daily of the Naltrexone 8 mg/ Bupropion 90 mg tablets.  $200 for 90 tablets    SAXENDA (Liraglutide): A daily injectable (3mg daily) medication used for type 2 diabetes. Glucagon-like peptide-1 (GLP-1) agonist. Contraindications include personal or family history of medullary thyroid cancer or MEN type 2. Acute pancreatitis has been observed in patients taking liraglutide. Liraglutide causes C-cell tumors in rats and mice. It is unknown whether liraglutide causes tumors in humans. Start at 0.6mg, increasing the dose weekly up to 3mg.     VYVANSE (Lisdexamfetamine dimesylate): a CNS stimulant used to treat ADHD. Indicated for the treatment of moderate to severe Binge Eating Disorder in Adults. Contraindicated in patients with known heart disease, structural abnormalities of the heart, serious heart arrhythmias or unexplained syncope. CNS stimulants such as vyvanse may cause manic or psychotic symptoms in patients with BPAD or pre-existing psychosis. Use with caution in patients with Raynaud's phenomenon. Most common side effects include dry mouth, insomnia, decreased appetite, increased heart rate, jittery feeling, constipation and anxiety.     WEGOVY (Semaglutide): A weekly injectable (2.4mg weekly) medication used for type 2 diabetes. Glucagon-like peptide-1 (GLP-1) agonist. Contraindications include personal or family history of medullary thyroid cancer or MEN type 2. Acute pancreatitis has been observed in patients taking Semaglutide. Semaglutide causes C-cell tumors in rats and mice. It is unknown whether Semaglutide causes tumors in humans. Start at 0.25mg, increasing to 0.5, 1.0, 1.7 then 2.4 monthly.        **Some lean proteins: chicken, turkey, tuna, salmon, crab, fish, shrimp, scallops, lobster, lean cuts of beef and pork, luncheon meats, veggie burgers, beans (black, lima, garbanzo, orr, kidney, refried), chile, cottage cheese, string cheese, other  cheese, eggs, tofu, peanut butter, nuts, vegan crumbles, greek yogurt

## 2022-06-01 NOTE — LETTER
2022         RE: Alfred Rosario  [address] [place] [address]        Dear Colleague,    Thank you for referring your patient, Alfred Rosario, to the [facility] SURG[facility] AND BARIATRICS CARE [place]. Please see a copy of my visit note below.    Alfred Rosario is 37 year old  male who presents for a billable video visit today.    How would you like to obtain your AVS? MyChart  If dropped from the video visit, the video invitation should be resent by: Text to cell phone: [phone]   Will anyone else be joining your video visit? No      Video Start Time: 10:30AM    Are there any specific questions or needs that you would like addressed at your visit today? New 24 wk program        New Medical Weight Management Consult    PATIENT:  Alfred Rosario  MRN:         [id number]  :         1985  STEVEN:         2022    Dear Dr. Boswell,    I had the pleasure of seeing your patient, Alfred Rosario. Full intake/assessment was done to determine barriers to weight loss success and develop a treatment plan. Alfred Rosario is a 37 year old male interested in treatment of medical problems associated with excess weight.  He is 6' tall and 307# and his BMI is 42.04  Mr. Rosario was diagnosed with atrial fibrillation and had cardioversion, subsequent CPAP for his previously undiagnosed sleep apnea and is on anticoagulation. He had a recent pneumonia and CT mentioned mild atherosclerotic vascular calcifications. His lipid panel in the remote past () showed low HDL 35 and mildly elevated . His triglycerides were normal. He has HTN and monitors his blood pressures at home-reports 120's systolic and high 80's diastolic. He has never smoked and he uses his treadmill 2X/wk at an incline for 30 minutes. [place] Half of his work day is in the office. He had an A1c of 5.8 last year. His renal studies have  "been normal alternating with GFRs in the 50's with creatinine 1.4-1.5 range.       ASSESSMENT  Snacking on cookies, pastas, bagel  Stress Eating  A little low on sleep chronically with weekend catch up  High Starch diet  Night Time eating    PLAN:  Discussed the importance of blood pressure, cholesterol and blood sugar control, knowing that at this young age he has early atherosclerotic changes despite being a never smoker and otherwise relatively healthy. This can be considered an advantage-having this information early and preventing future buildup with excellent preventive care. He will likely start a statin. Working with the dietitian will be helpful. His wife is also participating with the 24 week plan.  His father had stents placed.     Labs including A1c, Lipid panel, TSH, B-12, CMP, CBC  24 week program  Wellbutrin/Naltrexone combination  Discussed GLP-1 RA. He will read about them and consider  Metformin might be helpful long term for weight loss maintenance    He has the following co-morbidities:       6/1/2022   I have the following health issues associated with obesity: High Blood Pressure, Sleep Apnea   I have the following symptoms associated with obesity: None of the above       Patient Goals 6/1/2022   I am interested in having a healthier weight to diminish current health problems: Yes   I am interested in having a healthier weight in order to prevent future health problems: Yes   I am interested in having a healthier weight in order to have a future surgery: No       Referring Provider 6/1/2022   Please name the provider who referred you to Medical Weight Management.  If you do not know, please answer: \"I Don't Know\". Dr Nathanael Boswell       Weight History 6/1/2022   How concerned are you about your weight? Very Concerned   Would you describe your weight gain as gradual? Yes   I became overweight: As a Child   The following factors have contributed to my weight gain:  Eating Too Much, Lack of Exercise, " Genetic (Runs in the Family), Stress   I have tried the following methods to lose weight: Watching Portions or Calories, Exercise   My lowest weight since age 17 was: 189   My highest weight since age 17 was: 315   The most weight I have ever lost was: (lbs) 80   I have the following family history of obesity/being overweight:  Many of my relatives are overweight   Has anyone in your family had weight loss surgery? No   How has your weight changed over the last year?  Gained   How many pounds? 15-20       Diet Recall Review with Patient 6/1/2022   Do you typically eat breakfast? No   Do you typically eat lunch? Yes   If you do eat lunch, what types of food do you typically eat?  Chicken pasta rice   Do you typically eat supper? Yes   If you do eat supper, what types of food do you typically eat? meats and starches and some veggies   Do you typically eat snacks? Yes   If you do snack, what types of food do you typically eat? Cheese, chips   Do you like vegetables?  Yes   Do you drink water? Yes   How many glasses of juice do you drink in a typical day? 0   How many of glasses of milk do you drink in a typical day? 2   How many 8oz glasses of sugar containing drinks such as Chad-Aid/sweet tea do you drink in a day? 0   How many cans/bottles of sugar pop/soda/tea/sports drinks do you drink in a day? 0   How many cans/bottles of diet pop/soda/tea or sports drink do you drink in a day? 2   How often do you have a drink of alcohol? Never       Eating Habits 6/1/2022   Generally, my meals include foods like these: bread, pasta, rice, potatoes, corn, crackers, sweet dessert, pop, or juice. Almost Everyday   Generally, my meals include foods like these: fried meats, brats, burgers, french fries, pizza, cheese, chips, or ice cream. A Few Times a Week   Eat fast food (like McDonalds, [employer], Taco Bell). Less Than Weekly   Eat at a buffet or sit-down restaurant. Less Than Weekly   Eat most of my meals in front of the TV or  computer. Less Than Weekly   Often skip meals, eat at random times, have no regular eating times. Almost Everyday   Rarely sit down for a meal but snack or graze throughout.  Everyday   Eat extra snacks between meals. Almost Everyday   Eat most of my food at the end of the day. A Few Times a Week   Eat in the middle of the night or wake up at night to eat. Never   Eat extra snacks to prevent or correct low blood sugar. Never   Eat to prevent acid reflux or stomach pain. Never   Worry about not having enough food to eat. Never   Have you been to the food shelf at least a few times this year? No   I eat when I am depressed. Less Than Weekly   I eat when I am stressed. Everyday   I eat when I am bored. Once a Week   I eat when I am anxious. Everyday   I eat when I am happy or as a reward. Almost Everyday   I feel hungry all the time even if I just have eaten. Less Than Weekly   Feeling full is important to me. Less Than Weekly   I eat/snack without noticing that I am eating. Less Than Weekly   I eat when I am preparing the meal. Once a Week   I eat more than usual when I see others eating. Less Than Weekly   I have trouble not eating sweets, ice cream, cookies, or chips if they are around the house. Everyday   I think about food all day. Once a Week   What foods, if any, do you crave? Sweets/Candy/Chocolate       Amount of Food 6/1/2022   I make myself vomit what I have eaten or use laxatives to get rid of food. Never   I eat a large amount of food, like a loaf of bread, a box of cookies, a pint/quart of ice cream, all at once. Monthly   I eat a large amount of food even when I am not hungry. Monthly   I eat rapidly. Everyday   I eat alone because I feel embarrassed and do not want others to see how much I have eaten. Almost Everyday   I eat until I am uncomfortably full. Monthly   I feel bad, disgusted, or guilty after I overeat. Almost Everyday   I make myself vomit what I have eaten or use laxatives to get rid of  food. Never       Activity/Exercise History 6/1/2022   How much of a typical 12 hour day do you spend sitting? Half the Day   How much of a typical 12 hour day do you spend lying down? Less Than Half the Day   How much of a typical day do you spend walking/standing? Half the Day   How many hours (not including work) do you spend on the TV/Video Games/Computer/Tablet/Phone? 2-3 Hours   What keeps you from being more active? Too tired   How many total minutes do you spend doing some activity for the purpose of exercising when you exercise? 15-30 Minutes       PAST MEDICAL HISTORY:  Past Medical History:   Diagnosis Date     Acute respiratory failure with hypoxia (H) 07/06/2021     Atrial fibrillation (H) 2016    cardioversion. and CPAP     Congestive heart failure (H)      Dyslipidemia      Hypertension      Hypertensive urgency 07/06/2021     Metabolic syndrome      Morbid obesity with BMI of 40.0-44.9, adult (H)      Obstructive sleep apnea syndrome      Pre-diabetes        Work/Social History Reviewed With Patient 6/1/2022   My employment status is: Full-Time   My job is: [employer]    How much of your job is spent on the computer or phone? 50%   How many hours do you spend commuting to work daily?  1   What is your marital status? /In a Relationship   If in a relationship, is your significant other overweight? Yes   Do you have children? Yes   If you have children, are they overweight? No   Who do you live with?  spouse and children   Are they supportive of your health goals? Yes   Who does the food shopping?  both wife and I       Mental Health History Reviewed With Patient 6/1/2022   Have you ever been physically or sexually abused? No   How often in the past 2 weeks have you felt little interest or pleasure in doing things? Not at all   Over the past 2 weeks how often have you felt down, depressed, or hopeless? Not at all       Sleep History Reviewed With Patient 6/1/2022   How many  hours do you sleep at night? 6   Do you think that you snore loudly or has anybody ever heard you snore loudly (louder than talking or so loud it can be heard behind a shut door)? No   Has anyone seen or heard you stop breathing during your sleep? Yes   Do you often feel tired, fatigued, or sleepy during the day? No   Do you have a TV/Computer in your bedroom? No       MEDICATIONS:   Current Outpatient Medications   Medication Sig Dispense Refill     buPROPion (WELLBUTRIN SR) 150 MG 12 hr tablet Take 1 tablet (150 mg) by mouth 2 times daily 180 tablet 3     naltrexone (DEPADE/REVIA) 50 MG tablet Take 1 tablet (50 mg) by mouth daily 90 tablet 3     carvedilol (COREG) 25 MG tablet Take 1.5 tablets (37.5 mg) by mouth 2 times daily (with meals) 270 tablet 3     docosahexaenoic acid-epa 120-180 mg cap [DOCOSAHEXAENOIC ACID--180 MG CAP] Take 2 g by mouth daily.       furosemide (LASIX) 20 MG tablet TAKE 1 TABLET (20 MG) BY MOUTH DAILY AS NEEDED (INCREASE WT GAIN) 90 tablet 1     ibuprofen (ADVIL/MOTRIN) 800 MG tablet        melatonin 5 MG tablet Take 5 mg by mouth nightly as needed for sleep       multivitamin (MEN'S MULTI-VITAMIN) per tablet [MULTIVITAMIN (MEN'S MULTI-VITAMIN) PER TABLET] Take 1 tablet by mouth daily.       rivaroxaban ANTICOAGULANT (XARELTO) 20 MG TABS tablet Take 1 tablet (20 mg) by mouth daily (with dinner) Recommend starting this until follow-up with your primary care physician. 30 tablet 0     valsartan (DIOVAN) 320 MG tablet Take 1 tablet (320 mg) by mouth daily 90 tablet 3       ALLERGIES:   No Known Allergies    PHYSICAL EXAM:  Ht 1.829 m (6')   Wt 139.3 kg (307 lb)   BMI 41.64 kg/m    Pleasant and in no distress  Normal respiratory effort  Alert and oriented  Euthymic    FOLLOW-UP:   Labs -am draw for T and fasting for Lipids.  Dietitian monthly  Health  weekly  Wellbeats exercise program  August f/u naltrexone/wellbutrin/consider GLP-1 RA    TIME: 60 min spent on evaluation,  management, counseling, education, & motivational interviewing with greater than 50 % of the total time was spent on counseling and coordinating care    Sincerely,    Bailey Knox MD          Video-Visit Details    Type of service:  Video Visit    Video End Time (time video stopped): 11:30  Originating Location (pt. Location): Home    Distant Location (provider location):  [facility] SURGERY [facility] AND BARIATRICS [facility]     Platform used for Video Visit: Osorio      Again, thank you for allowing me to participate in the care of your patient.        Sincerely,        Bailey Knox MD

## 2022-06-06 ENCOUNTER — VIRTUAL VISIT (OUTPATIENT)
Dept: SURGERY | Facility: CLINIC | Age: 37
End: 2022-06-06
Payer: COMMERCIAL

## 2022-06-06 DIAGNOSIS — E66.01 MORBID OBESITY WITH BMI OF 40.0-44.9, ADULT (H): Primary | ICD-10-CM

## 2022-06-06 PROCEDURE — 97802 MEDICAL NUTRITION INDIV IN: CPT | Mod: GT | Performed by: DIETITIAN, REGISTERED

## 2022-06-06 NOTE — PROGRESS NOTES
Alfred Rosario is a 37 year old who is being evaluated via a billable video visit.      How would you like to obtain your AVS? MyChart  If the video visit is dropped, the invitation should be resent by: Send to e-mail at: [email or web address]  Will anyone else be joining your video visit? No       Video Start Time: 2:29 PM    Medical Weight Loss Initial Diet Evaluation  Assessment:  Alfred is presenting today for a new weight management nutrition consultation. Pt has had an initial appointment with Dr. Knox.  Weight loss medication: Wellbutrin/Naltrexone combination.     Anthropometrics:  Initial weight: 307  BMI: There is no height or weight on file to calculate BMI.   Ideal body weight: 77.6 kg (171 lb 1.2 oz)  Adjusted ideal body weight: 102.3 kg (225 lb 7.1 oz)  Estimated RMR (Headrick-St Jeor equation):  2353 kcals x 1.2 (sedentary) = 2824 kcals (for weight maintenance)  2353 kcals x 1.3 (light active) = 3236 kcals (for weight maintenance)    Recommended Protein Intake: 120-140 grams of protein/day    Medical History:  Patient Active Problem List   Diagnosis     Hypertension, uncontrolled     Dilated cardiomyopathy (H)     LEE ANN (obstructive sleep apnea)     Morbid obesity with BMI of 40.0-44.9, adult (H)     Paroxysmal A-fib (H)     Benign essential hypertension     Dyslipidemia     Metabolic syndrome     Pre-diabetes     Hepatic steatosis      Diabetes: No  HbA1c:  No results found for: HGBA1C    Nutrition History:   Food allergies/intolerances/cultural or religous food customs: No  Weight loss history: Watching Portions or Calories, Exercise    Dietary Recall:  Starts work really early - starts work at 3 am  Breakfast: Caffeine - diet coke, 7-8 am chicken/beef dish  Lunch: snacks throughout the day  10-11 am   When he gets home (2 pm) will have a snack, usually carbs - bagel, chip  Dinner: 5 pm - sometimes a meal or grazing  Snacking after dinner: ice cream, chips  Tries to goes to sleep at 8 pm      Beverages:   Milk: whole milk  Water  Small can diet coke    Does not drink regular soda and juice    Exercise:   Not discussed this visit    Nutrition Diagnosis (PES statement):   Overweight/Obesity (NC 3.3) related to excessive calorie intake as evidenced by BMI 41    Nutrition Intervention  1. Food and/or Nutrient Delivery   a. Placed emphasis on importance of developing a healthy meal routine, aiming for 3 meals a day    2. Nutrition Education   a. Discussed with patient how to build a meal: the importance of including a lean/low fat protein at each meal.  b. Educated on sources of lean protein, portion sizes, the amount of grams found in each source. Recommend patient to aim for 30-40 g protein at each meal.  c. Discussed the importance of adequate hydration, with emphasis on drinking 64oz of water or zero calorie beverages per day.  3. Nutrition Counseling   a. Encouraged importance of developing routine exercise for health benefits and weight loss.  b. Discussed mindful eating techniques such as eating off smaller places/bowls, taking 20-30 minutes to eat in a calm/relaxed environment without distractions.    Goals established by patient:   1. Have a protein source at each meal  2. Meal prep for 3 meals per day  3. Limit sweets intake    Assessment/Plan:    Pt will follow up with health  and 1.5 month(s) with dietitian.     Video-Visit Details    Type of service:  Video Visit    Video End Time:2:47 pm    Originating Location (pt. Location): Home    Distant Location (provider location):  [facility] SURGERY CLINIC AND BARIATRICS CARE [place]     Platform used for Video Visit: Osorio Terrell

## 2022-06-06 NOTE — LETTER
6/6/2022         RE: Alfred Rosario  [address] [place] [address]        Dear Colleague,    Thank you for referring your patient, Alfred Rosario, to the [facility] SURGERY CLINIC AND BARIATRICS CARE [place]. Please see a copy of my visit note below.    Alfred Rosario is a 37 year old who is being evaluated via a billable video visit.      How would you like to obtain your AVS? MyChart  If the video visit is dropped, the invitation should be resent by: Send to e-mail at: [email or web address]  Will anyone else be joining your video visit? No       Video Start Time: 2:29 PM    Medical Weight Loss Initial Diet Evaluation  Assessment:  Alfred is presenting today for a new weight management nutrition consultation. Pt has had an initial appointment with Dr. Knox.  Weight loss medication: Wellbutrin/Naltrexone combination.     Anthropometrics:  Initial weight: 307  BMI: There is no height or weight on file to calculate BMI.   Ideal body weight: 77.6 kg (171 lb 1.2 oz)  Adjusted ideal body weight: 102.3 kg (225 lb 7.1 oz)  Estimated RMR (Colquitt-St Jeor equation):  2353 kcals x 1.2 (sedentary) = 2824 kcals (for weight maintenance)  2353 kcals x 1.3 (light active) = 3236 kcals (for weight maintenance)    Recommended Protein Intake: 120-140 grams of protein/day    Medical History:  Patient Active Problem List   Diagnosis     Hypertension, uncontrolled     Dilated cardiomyopathy (H)     LEE ANN (obstructive sleep apnea)     Morbid obesity with BMI of 40.0-44.9, adult (H)     Paroxysmal A-fib (H)     Benign essential hypertension     Dyslipidemia     Metabolic syndrome     Pre-diabetes     Hepatic steatosis      Diabetes: No  HbA1c:  No results found for: HGBA1C    Nutrition History:   Food allergies/intolerances/cultural or religous food customs: No  Weight loss history: Watching Portions or Calories, Exercise    Dietary Recall:  Starts work really early - starts work at 3 am  Breakfast:  Caffeine - diet coke, 7-8 am chicken/beef dish  Lunch: snacks throughout the day  10-11 am   When he gets home (2 pm) will have a snack, usually carbs - bagel, chip  Dinner: 5 pm - sometimes a meal or grazing  Snacking after dinner: ice cream, chips  Tries to goes to sleep at 8 pm     Beverages:   Milk: whole milk  Water  Small can diet coke    Does not drink regular soda and juice    Exercise:   Not discussed this visit    Nutrition Diagnosis (PES statement):   Overweight/Obesity (NC 3.3) related to excessive calorie intake as evidenced by BMI 41    Nutrition Intervention  1. Food and/or Nutrient Delivery   a. Placed emphasis on importance of developing a healthy meal routine, aiming for 3 meals a day    2. Nutrition Education   a. Discussed with patient how to build a meal: the importance of including a lean/low fat protein at each meal.  b. Educated on sources of lean protein, portion sizes, the amount of grams found in each source. Recommend patient to aim for 30-40 g protein at each meal.  c. Discussed the importance of adequate hydration, with emphasis on drinking 64oz of water or zero calorie beverages per day.  3. Nutrition Counseling   a. Encouraged importance of developing routine exercise for health benefits and weight loss.  b. Discussed mindful eating techniques such as eating off smaller places/bowls, taking 20-30 minutes to eat in a calm/relaxed environment without distractions.    Goals established by patient:   1. Have a protein source at each meal  2. Meal prep for 3 meals per day  3. Limit sweets intake    Assessment/Plan:    Pt will follow up with health  and 1.5 month(s) with dietitian.     Video-Visit Details    Type of service:  Video Visit    Video End Time:2:47 pm    Originating Location (pt. Location): Home    Distant Location (provider location):  [facility] SURGERY CLINIC AND BARIATRICS CARE [place]     Platform used for Video Visit: Osorio Terrell      Again, thank  you for allowing me to participate in the care of your patient.        Sincerely,        Linda Terrlel

## 2022-06-07 ENCOUNTER — TELEPHONE (OUTPATIENT)
Dept: CARDIOLOGY | Facility: CLINIC | Age: 37
End: 2022-06-07

## 2022-06-07 ENCOUNTER — OFFICE VISIT (OUTPATIENT)
Dept: CARDIOLOGY | Facility: CLINIC | Age: 37
End: 2022-06-07
Attending: INTERNAL MEDICINE
Payer: COMMERCIAL

## 2022-06-07 VITALS
BODY MASS INDEX: 41.45 KG/M2 | DIASTOLIC BLOOD PRESSURE: 90 MMHG | RESPIRATION RATE: 16 BRPM | HEART RATE: 76 BPM | HEIGHT: 72 IN | WEIGHT: 306 LBS | SYSTOLIC BLOOD PRESSURE: 130 MMHG

## 2022-06-07 DIAGNOSIS — I42.0 DILATED CARDIOMYOPATHY (H): Primary | ICD-10-CM

## 2022-06-07 DIAGNOSIS — G47.33 OSA (OBSTRUCTIVE SLEEP APNEA): ICD-10-CM

## 2022-06-07 DIAGNOSIS — E78.5 DYSLIPIDEMIA: ICD-10-CM

## 2022-06-07 DIAGNOSIS — I48.0 PAROXYSMAL A-FIB (H): ICD-10-CM

## 2022-06-07 DIAGNOSIS — E66.01 MORBID OBESITY WITH BMI OF 40.0-44.9, ADULT (H): ICD-10-CM

## 2022-06-07 DIAGNOSIS — I10 BENIGN ESSENTIAL HYPERTENSION: ICD-10-CM

## 2022-06-07 PROCEDURE — 99214 OFFICE O/P EST MOD 30 MIN: CPT | Performed by: INTERNAL MEDICINE

## 2022-06-07 NOTE — TELEPHONE ENCOUNTER
Harinder Fisher MD Caswell, Mallory J, RN  Caller: Unspecified (Today,  2:26 PM)  No calcium score necessary, just really want a CTA.   LF        CCTA ordered; await patient call back to discuss Entresto. -[facility] [place]

## 2022-06-07 NOTE — LETTER
6/7/2022    Nathanael Boswell MD  [address] [facility]] [address]    RE: Alfred Rosario       Dear Colleague,     I had the pleasure of seeing Alfred Rosario in the [facility].      [facility]  Heart Care Clinic Follow-up Note    Assessment & Plan        (I42.0) Dilated cardiomyopathy (H)  (primary encounter diagnosis)  Comment: In past ejection fraction 25% with atrial fibrillation, most recently calculated 49 but estimated 40% with us, prior to that at [facility] was 60 to 65%.  Most recent echo 50 to 55% and suspect due to systemic hypertension, will arrange for outpatient coronary CTA in about a month.     (I10) Benign essential hypertension  Comment: I rechecked myself and the large thigh cuff and still elevated.We will try to prior authorize a change from valsartan to Entresto.  If not allowed we will consider increasing valsartan, carvedilol, or most likely add Aldactone 12 and half milligrams p.o. twice daily.    (I48.0) Paroxysmal A-fib (H)  Comment: Initially had this, in sinus rhythm, [id number] score is 2 and chronic Xarelto currently.  Once he stabilizes we will look at possibly getting event monitor and if no recurrent A. fib discontinue Xarelto.    (G47.33) LEE ANN (obstructive sleep apnea)  Comment: On CPAP nightly and doing well.    (E66.01,  Z68.41) Morbid obesity with BMI of 40.0-44.9, adult (H)  Comment: Seeing bariatric program as well as nutritionist and working on weight loss.    (E78.5) Dyslipidemia  Comment: Total cholesterol 191 with LDL of 114 which is acceptable.    Sepsis-patient had elevated lactic acid level, positive for rhinovirus as well as blood culture for haemophilus influenza, elevated white blood cell count was hospitalized for this.    Anxiety-defer to mental health provider, was placed on Wellbutrin.    Plan  1.  Try to prior authorize Entresto.  Uses in place of losartan, if not allowed would then go to Aldactone.  2.  We will arrange  for coronary CTA once above done.  3.  We will arrange for repeat echo in about 3 to 4 months and then follow-up with me thereafter.  4.  Work on weight loss.    Subjective  CC: 37-year-old white gentleman here for post hospital discharge follow-up with his wife present.  He was hospitalized due to community-acquired pneumonia, was septic.  Apparently stated home for about a month.  [place]  Tells me doing well without any fatigue, syncope, dizziness, chest discomfort, palpitations, significant shortness of breath, PND, orthopnea or peripheral edema.    Medications  Current Outpatient Medications   Medication Sig Dispense Refill     carvedilol (COREG) 25 MG tablet Take 1.5 tablets (37.5 mg) by mouth 2 times daily (with meals) 270 tablet 3     docosahexaenoic acid-epa 120-180 mg cap [DOCOSAHEXAENOIC ACID--180 MG CAP] Take 2 g by mouth daily.       furosemide (LASIX) 20 MG tablet TAKE 1 TABLET (20 MG) BY MOUTH DAILY AS NEEDED (INCREASE WT GAIN) 90 tablet 1     melatonin 5 MG tablet Take 5 mg by mouth nightly as needed for sleep       multivitamin (MEN'S MULTI-VITAMIN) per tablet [MULTIVITAMIN (MEN'S MULTI-VITAMIN) PER TABLET] Take 1 tablet by mouth daily.       naltrexone (DEPADE/REVIA) 50 MG tablet Take 1 tablet (50 mg) by mouth daily 90 tablet 3     rivaroxaban ANTICOAGULANT (XARELTO) 20 MG TABS tablet Take 1 tablet (20 mg) by mouth daily (with dinner) Recommend starting this until follow-up with your primary care physician. 30 tablet 0     valsartan (DIOVAN) 320 MG tablet Take 1 tablet (320 mg) by mouth daily 90 tablet 3     buPROPion (WELLBUTRIN SR) 150 MG 12 hr tablet Take 1 tablet (150 mg) by mouth 2 times daily (Patient not taking: Reported on 6/7/2022) 180 tablet 3       Objective  BP (!) 130/90 (BP Location: Right arm, Patient Position: Sitting, Cuff Size: Thigh)   Pulse 76   Resp 16   Ht 1.829 m (6')   Wt 138.8 kg (306 lb)   BMI 41.50 kg/m      General Appearance:     Alert, cooperative, no distress, appears stated age   Head:    Normocephalic, without obvious abnormality, atraumatic   Throat:   Lips, mucosa, and tongue normal; teeth and gums normal   Neck:   Supple, symmetrical, trachea midline, no adenopathy;        thyroid:  No enlargement/tenderness/nodules; no carotid    bruit or JVD   Back:     Symmetric, no curvature, ROM normal, no CVA tenderness   Lungs:     Clear to auscultation bilaterally, respirations unlabored   Chest wall:    No tenderness or deformity   Heart:    Regular rate and rhythm, S1 and S2 normal, no murmur, rub   or gallop   Abdomen:     Soft, non-tender, bowel sounds active all four quadrants,     no masses, no organomegaly   Extremities:   Normal, atraumatic, no cyanosis or edema   Pulses:   2+ and symmetric all extremities   Skin:   Skin color, texture, turgor normal, no rashes or lesions     Results    Lab Results personally reviewed   Lab Results   Component Value Date    CHOL 191 02/09/2011     Lab Results   Component Value Date    HDL 43 03/27/2012    HDL 35 (L) 02/09/2011     No components found for: LDLCALC  Lab Results   Component Value Date    TRIG 162 (H) 03/27/2012    TRIG 123 02/09/2011     Lab Results   Component Value Date    WBC 27.9 (H) 05/05/2022    HGB 13.1 (L) 05/05/2022    HCT 39.6 (L) 05/05/2022     05/05/2022     Lab Results   Component Value Date    BUN 20 05/05/2022     (L) 05/05/2022    CO2 24 05/05/2022       Reviewed electrocardiogram sinus tachycardia, possible old septal Q wave MI type pattern.            Thank you for allowing me to participate in the care of your patient.      Sincerely,   OLLIE FISHER MD   [facility]  cc:   Ollie Fisher MD  [address] [place] [address]

## 2022-06-07 NOTE — PATIENT INSTRUCTIONS
Mr Alfred OSCAR Rosario,  I enjoyed visiting with you again today.  I am glad to hear you are doing well.  Per our conversation let me see if I can get ENTRESTO.  I will plan on seeing you in a few months with repeat echo.  Harinder Fisher

## 2022-06-07 NOTE — TELEPHONE ENCOUNTER
----- Message from Harinder Fisher MD sent at 6/7/2022  8:27 AM CDT -----  Can we try to prior authorize Entresto highest dose 200 mg p.o. twice daily?  This would replace the valsartan.  Once that is taken care of I would want to get a coronary CTA on him if you can help arrange that.  A few months thereafter would need to get an echo as well.  LF          Per 6/7/22 OV note with LBF:     Plan  1.  Try to prior authorize Entresto.  Uses in place of losartan, if not allowed would then go to Aldactone.  2.  We will arrange for coronary CTA once above done.  3.  We will arrange for repeat echo in about 3 to 4 months and then follow-up with me thereafter.  4.  Work on weight loss.        ==left message to discuss Entresto and PA process. Once patient calls back and verbalized understanding, will then send in Entresto to make sure he understands that this will replace his Valsartan if approved. Order for CCTA - these are booked out until mid-July. Echo for in 3 months. -[facility]        Dr. Fisher,  For the pending CCTA after Entresto approval- do you want a calcium score?  Thanks,  Mal

## 2022-06-07 NOTE — PROGRESS NOTES
[facility]  Heart Care Clinic Follow-up Note    Assessment & Plan        (I42.0) Dilated cardiomyopathy (H)  (primary encounter diagnosis)  Comment: In past ejection fraction 25% with atrial fibrillation, most recently calculated 49 but estimated 40% with us, prior to that at [facility] was 60 to 65%.  Most recent echo 50 to 55% and suspect due to systemic hypertension, will arrange for outpatient coronary CTA in about a month.     (I10) Benign essential hypertension  Comment: I rechecked myself and the large thigh cuff and still elevated.We will try to prior authorize a change from valsartan to Entresto.  If not allowed we will consider increasing valsartan, carvedilol, or most likely add Aldactone 12 and half milligrams p.o. twice daily.    (I48.0) Paroxysmal A-fib (H)  Comment: Initially had this, in sinus rhythm, [id number]-[id number] score is 2 and chronic Xarelto currently.  Once he stabilizes we will look at possibly getting event monitor and if no recurrent A. fib discontinue Xarelto.    (G47.33) LEE ANN (obstructive sleep apnea)  Comment: On CPAP nightly and doing well.    (E66.01,  Z68.41) Morbid obesity with BMI of 40.0-44.9, adult (H)  Comment: Seeing bariatric program as well as nutritionist and working on weight loss.    (E78.5) Dyslipidemia  Comment: Total cholesterol 191 with LDL of 114 which is acceptable.    Sepsis-patient had elevated lactic acid level, positive for rhinovirus as well as blood culture for haemophilus influenza, elevated white blood cell count was hospitalized for this.    Anxiety-defer to mental health provider, was placed on Wellbutrin.    Plan  1.  Try to prior authorize Entresto.  Uses in place of losartan, if not allowed would then go to Aldactone.  2.  We will arrange for coronary CTA once above done.  3.  We will arrange for repeat echo in about 3 to 4 months and then follow-up with me thereafter.  4.  Work on weight loss.    Subjective  CC: 37-year-old white gentleman  here for post hospital discharge follow-up with his wife present.  He was hospitalized due to community-acquired pneumonia, was septic.  Apparently stated home for about a month.  [place]  Tells me doing well without any fatigue, syncope, dizziness, chest discomfort, palpitations, significant shortness of breath, PND, orthopnea or peripheral edema.    Medications  Current Outpatient Medications   Medication Sig Dispense Refill     carvedilol (COREG) 25 MG tablet Take 1.5 tablets (37.5 mg) by mouth 2 times daily (with meals) 270 tablet 3     docosahexaenoic acid-epa 120-180 mg cap [DOCOSAHEXAENOIC ACID--180 MG CAP] Take 2 g by mouth daily.       furosemide (LASIX) 20 MG tablet TAKE 1 TABLET (20 MG) BY MOUTH DAILY AS NEEDED (INCREASE WT GAIN) 90 tablet 1     melatonin 5 MG tablet Take 5 mg by mouth nightly as needed for sleep       multivitamin (MEN'S MULTI-VITAMIN) per tablet [MULTIVITAMIN (MEN'S MULTI-VITAMIN) PER TABLET] Take 1 tablet by mouth daily.       naltrexone (DEPADE/REVIA) 50 MG tablet Take 1 tablet (50 mg) by mouth daily 90 tablet 3     rivaroxaban ANTICOAGULANT (XARELTO) 20 MG TABS tablet Take 1 tablet (20 mg) by mouth daily (with dinner) Recommend starting this until follow-up with your primary care physician. 30 tablet 0     valsartan (DIOVAN) 320 MG tablet Take 1 tablet (320 mg) by mouth daily 90 tablet 3     buPROPion (WELLBUTRIN SR) 150 MG 12 hr tablet Take 1 tablet (150 mg) by mouth 2 times daily (Patient not taking: Reported on 6/7/2022) 180 tablet 3       Objective  BP (!) 130/90 (BP Location: Right arm, Patient Position: Sitting, Cuff Size: Thigh)   Pulse 76   Resp 16   Ht 1.829 m (6')   Wt 138.8 kg (306 lb)   BMI 41.50 kg/m      General Appearance:    Alert, cooperative, no distress, appears stated age   Head:    Normocephalic, without obvious abnormality, atraumatic   Throat:   Lips, mucosa, and tongue normal; teeth and gums normal   Neck:   Supple,  symmetrical, trachea midline, no adenopathy;        thyroid:  No enlargement/tenderness/nodules; no carotid    bruit or JVD   Back:     Symmetric, no curvature, ROM normal, no CVA tenderness   Lungs:     Clear to auscultation bilaterally, respirations unlabored   Chest wall:    No tenderness or deformity   Heart:    Regular rate and rhythm, S1 and S2 normal, no murmur, rub   or gallop   Abdomen:     Soft, non-tender, bowel sounds active all four quadrants,     no masses, no organomegaly   Extremities:   Normal, atraumatic, no cyanosis or edema   Pulses:   2+ and symmetric all extremities   Skin:   Skin color, texture, turgor normal, no rashes or lesions     Results    Lab Results personally reviewed   Lab Results   Component Value Date    CHOL 191 02/09/2011     Lab Results   Component Value Date    HDL 43 03/27/2012    HDL 35 (L) 02/09/2011     No components found for: LDLCALC  Lab Results   Component Value Date    TRIG 162 (H) 03/27/2012    TRIG 123 02/09/2011     Lab Results   Component Value Date    WBC 27.9 (H) 05/05/2022    HGB 13.1 (L) 05/05/2022    HCT 39.6 (L) 05/05/2022     05/05/2022     Lab Results   Component Value Date    BUN 20 05/05/2022     (L) 05/05/2022    CO2 24 05/05/2022       Reviewed electrocardiogram sinus tachycardia, possible old septal Q wave MI type pattern.

## 2022-06-10 RX ORDER — SACUBITRIL AND VALSARTAN 97; 103 MG/1; MG/1
1 TABLET, FILM COATED ORAL 2 TIMES DAILY
Qty: 60 TABLET | Refills: 11 | Status: SHIPPED | OUTPATIENT
Start: 2022-06-10 | End: 2023-06-14

## 2022-06-10 NOTE — TELEPHONE ENCOUNTER
Received a call back from Alfred. He is willing to switch from valsartan to Entresto and we will start PA process and submit medication. He will not start until discussion with writer and again, verbalized understanding that this will replace Valsartan. $10 co-pay card will be mailed out for future use as it requires patient activation. -[facility]

## 2022-06-13 ENCOUNTER — VIRTUAL VISIT (OUTPATIENT)
Dept: SURGERY | Facility: CLINIC | Age: 37
End: 2022-06-13
Payer: COMMERCIAL

## 2022-06-13 DIAGNOSIS — E66.9 OBESITY: Primary | ICD-10-CM

## 2022-06-13 PROCEDURE — 99207 PR MEDICAL WEIGHT MANAGEMENT PROGRAM ENROLLMENT: CPT

## 2022-06-13 PROCEDURE — 2894A PR MEDICAL WEIGHT MANAGEMENT PROGRAM ENROLLMENT: CPT

## 2022-06-13 NOTE — LETTER
2022         RE: Alfred Rosario  [address] [place] [address]        Dear Colleague,    Thank you for referring your patient, Alfred Roasrio, to the [facility] AND [facility] [place]. Please see a copy of my visit note below.    Reason for Visit: 24-Week Healthy Lifestyle Plan Initial Visit - Health Coaching    Progress Notes:  New 24-Week Healthy Lifestyle Plan Weight Management Health Coaching Note  Alfred Rosario   MRN: [id number]  : 1985  STEVEN: 2022    Initial Health  Visit #1    ASSESSMENT:  Initial Start Date:  2022  Graduation Date:  2022  [employer] (online resource) enrollment date(s):  2022  Physician:  Dr. Knox  Referred by: PCP - Dr. Boswell  Initial Weight (lbs):  lbs.  Current Weight (lbs): 307 lbs.  Average Daily Water (ounces): - oz/day  Weight Loss Medication: Naltrexone  Nutrition Plan: real whole foods     PATIENT INFORMATION PROVIDED via email sent by Health :  1.) 24 Week Healthy Lifestyle Plan Overview:  Explained the structure of the 24-week plan, reviewed scheduling information including the main scheduling phone number [phone], discussed all coaching sessions will be done via phone.  2.) [employer] - Online resource available to patient on the 1st day of the month following start date with Health .  Patient will receive a Welcome email from [employer] with their user name and password.  Patient will have full access to [employer] for a duration of 7 months.  This online resource will be continued if patient purchases the 24-week Extension which includes 3, 30-min. Health and Wellness Coaching appointments.  3.) Support Group monthly topics, dates/times - Flyer with more information provided by the Health  (see end of note for the current list)  4.) Explain the role of a Health  & the FOUR Pillars of Health (Nutrition, Exercise/Activity/Movement, Sleep and Stress  Management)      INITIAL INTAKE: will complete at visit #2 on 22  The four pillars of well-being may impact your ability to manage weight.   Level of Satisfaction:  Rate your current level of satisfaction on a scale of 1-10 in each pillar.     Nutrition (1 -10):     Movement/Activity (1 -10):     Sleep (1 -10):     Stress Management (1 -10):       WELLNESS VISION: 5 minute Visioning Exercise (may be completed at visit #2/#3)    You may choose to close your eyes for this exercise. Start with three full breaths to bring your attention inward.    In your mind s eye, see yourself in the near future. You are your healthiest, happiest, and most true version of yourself. What do you see? What do you notice?     Invite patient to expand on this vision, and/or start  trying on  this vision this week.    Goals set by patient with ARMEN Mckeon on 22:  1. Have a protein source at each meal  2. Meal prep for 3 meals per day  3. Limit sweets intake  4. Time structured eatinam-5pm window to eat/consume (helps with late night snacking)      NOTES:    Works full-time for [employer]  Health issues - blood pressure, cardiac  Experience with losing weight in the past - has lost 80lbs - physical health related   to wife with 1.5 year old and 4 year old (2 girls)  1 dog - most consistent exercise is walking the dog 1-1.5 mile e/o day - helpful  Making time to exercise can be a struggle      FOLLOW-Up:   with [facility] #2 assessment   with ARMEN Mckeon f/u   with [facility] #3  8/10 with Dr. Knox f/u    Reminder:  Monthly Healthy Lifestyle Support Groups offered virtually via TEAMS.  Contact Maya Vieira ([email or web address]) to be added to the invite list.  Upcoming:  (, 12:30pm to 1:30pm):     ~ Setting Limits and BoundariesMaya, National Board Certified Health    ~ Open Forum   ~ Special Guest Registered Dietician Angela Saldana   ~ Open Forum   ~  Gratitude Alka Pepper National Board Certified Health    ~ Navigating How to Eat Around the Holidays with ARMEN Rios   ~ Changing your relationship with movement with  Marlyn National Board Certified Health      SIGNATURE:  MARYCHUY Brooks, [facility]  National Board-Certified Health &   24 Week Healthy Lifestyle Program Health   [facility] Weight Management Program  email:  [email or web address]  appointment [phone]      Again, thank you for allowing me to participate in the care of your patient.        Sincerely,        Alka Guerrier

## 2022-06-13 NOTE — PROGRESS NOTES
Reason for Visit: 24-Week Healthy Lifestyle Plan Initial Visit - Health Coaching    Progress Notes:  New 24-Week Healthy Lifestyle Plan Weight Management Health Coaching Note  Alfred Rosario   MRN: [id number]  : 1985  STEVEN: 2022    Initial Health  Visit #1    ASSESSMENT:  Initial Start Date:  2022  Graduation Date:  2022  [employer] (online resource) enrollment date(s):  2022  Physician:  Dr. Knox  Referred by: PCP - Dr. Boswell  Initial Weight (lbs):  lbs.  Current Weight (lbs): 307 lbs.  Average Daily Water (ounces): - oz/day  Weight Loss Medication: Naltrexone  Nutrition Plan: real whole foods     PATIENT INFORMATION PROVIDED via email sent by Health :  1.) 24 Week Healthy Lifestyle Plan Overview:  Explained the structure of the 24-week plan, reviewed scheduling information including the main scheduling phone number [phone], discussed all coaching sessions will be done via phone.  2.) [employer] - Online resource available to patient on the 1st day of the month following start date with Health .  Patient will receive a Welcome email from [employer] with their user name and password.  Patient will have full access to [employer] for a duration of 7 months.  This online resource will be continued if patient purchases the 24-week Extension which includes 3, 30-min. Health and Wellness Coaching appointments.  3.) Support Group monthly topics, dates/times - Flyer with more information provided by the Health  (see end of note for the current list)  4.) Explain the role of a Health  & the FOUR Pillars of Health (Nutrition, Exercise/Activity/Movement, Sleep and Stress Management)      INITIAL INTAKE: will complete at visit #2 on 22  The four pillars of well-being may impact your ability to manage weight.   Level of Satisfaction:  Rate your current level of satisfaction on a scale of 1-10 in each pillar.     Nutrition (1 -10):     Movement/Activity (1  -10):     Sleep ( -10):     Stress Management ( -10):       WELLNESS VISION: 5 minute Visioning Exercise (may be completed at visit #2/#3)    You may choose to close your eyes for this exercise. Start with three full breaths to bring your attention inward.    In your mind s eye, see yourself in the near future. You are your healthiest, happiest, and most true version of yourself. What do you see? What do you notice?     Invite patient to expand on this vision, and/or start  trying on  this vision this week.    Goals set by patient with ARMEN Mckeon on 22:  1. Have a protein source at each meal  2. Meal prep for 3 meals per day  3. Limit sweets intake  4. Time structured eatinam-5pm window to eat/consume (helps with late night snacking)      NOTES:    Works full-time for [employer]  Health issues - blood pressure, cardiac  Experience with losing weight in the past - has lost 80lbs - physical health related   to wife with 1.5 year old and 4 year old (2 girls)  1 dog - most consistent exercise is walking the dog 1-1.5 mile e/o day - helpful  Making time to exercise can be a struggle      FOLLOW-Up:   with [facility] #2 assessment   with ARMEN Mckeon f/u   with [facility] #3  8/10 with Dr. Knox f/u    Reminder:  Monthly Healthy Lifestyle Support Groups offered virtually via TEAMS.  Contact Maya Vieira ([email or web address]) to be added to the invite list.  Upcoming:  (, 12:30pm to 1:30pm):     ~ Setting Limits and BoundariesMaya National Board Certified Health    ~ Open Forum   ~ Special Guest Registered Dietician Angela Saldana   ~ Open Forum   ~ Gratitude Practices, Alka National Board Certified Health    ~ Navigating How to Eat Around the Holidays with ARMEN Rios   ~ Changing your relationship with movement with  Marlyn National Board Certified Health      SIGNATURE:  MARYCHUY Brooks,  [facility]  National Board-Certified Health &   24 Week Healthy Lifestyle Program Health   [facility]  email:  [email or web address]  appointment [phone]

## 2022-06-17 NOTE — TELEPHONE ENCOUNTER
Central Prior Authorization Team   Phone: [phone]    PA Initiation    Medication: Entresto 97-103MG tablets    Insurance Company: [employer] ([facility]) - Phone [phone] Fax [phone]  Pharmacy Filling the Rx: [id number] - [place], [place] - [address]  Filling Pharmacy Phone: [phone]  Filling Pharmacy Fax:    Start Date: 6/17/2022

## 2022-06-17 NOTE — TELEPHONE ENCOUNTER
PRIOR AUTHORIZATION DENIED    Medication: Entresto 97-103MG tablets      Denial Date: 6/17/2022    Denial Rational:       Appeal Information: This medication was denied. If physician would like to appeal because patient has contraindication or allergy to covered medication please write letter of medical necessity and route back to PA team to initiate.  If no further action is needed please close encounter thank you.

## 2022-06-17 NOTE — TELEPHONE ENCOUNTER
===View-only below this line===  ----- Message -----  From: Claudia Goff  Sent: 6/17/2022   3:54 PM CDT  To: Zena Becker RN    ----- Message from Claudia Goff sent at 6/17/2022  3:54 PM CDT -----  Hello,   If patient does have diagnosis of heart failure NYHA class II or above let me know and I can possibly resubmit. Thanks!        Thong Jacobs Dr.sto was denied due to below reasons in PA team's note- looks like no dx of specific class of heart failure and/or on a stabilized dose of beta blocker. Your note mentioned if it were to be denied you would use aldactone with his valsartan. Recommendations?  Thanks,  Tee

## 2022-06-20 NOTE — TELEPHONE ENCOUNTER
Resubmitted with diagnosis given by provider below:    Mariluz Banks, RN routed conversation to You 4 hours ago (6:50 AM)     Harinder Fisher MD Caswell, Mallory J RN 3 days ago     LF    Can we label as acute on chronic heart failure in the setting of diminished ejection fraction of 20%, I would say he is with NYHA class II-III.  Medication helps but this is resolved and thus I would use a diagnosis.   LF    Message text

## 2022-06-21 NOTE — TELEPHONE ENCOUNTER
Prior Authorization Approval    Authorization Effective Date: 6/20/2022  Authorization Expiration Date: 6/20/2023  Medication: Entresto 97-103MG tablets    Approved Dose/Quantity:   Reference #:     Insurance Company: [employer] ([facility]) - Phone [phone] Fax [phone]  Expected CoPay:       CoPay Card Available:      Foundation Assistance Needed:    Which Pharmacy is filling the prescription (Not needed for infusion/clinic administered): [id number] - [place], [place] - [address]  Pharmacy Notified: No  Patient Notified: No

## 2022-06-21 NOTE — TELEPHONE ENCOUNTER
Per pharmacy- they do not have this in stock.  Will order for tomorrow- pharmacy will let patient know when this is ready for .

## 2022-07-06 ENCOUNTER — TELEPHONE (OUTPATIENT)
Dept: FAMILY MEDICINE | Facility: CLINIC | Age: 37
End: 2022-07-06

## 2022-07-06 NOTE — TELEPHONE ENCOUNTER
Call attempt x2 for health and wellness coaching appointment #2 as part of the 24-week Healthy Lifestyle Plan.    Sent email and mychart message reminding patient of upcoming appointments with health , dietitian and physician.     May call the main scheduling line for assistance as well:  [phone].     SIGNATURE:  MARYCHUY Brooks, [facility]  National Board-Certified Health &   24-Week Healthy Lifestyle Plan Health   [facility]  email:  [email or web address]  appointment [phone]

## 2022-07-25 ENCOUNTER — VIRTUAL VISIT (OUTPATIENT)
Dept: SURGERY | Facility: CLINIC | Age: 37
End: 2022-07-25
Payer: COMMERCIAL

## 2022-07-25 DIAGNOSIS — E66.9 OBESITY: Primary | ICD-10-CM

## 2022-07-25 PROCEDURE — 99207 PR MWM HEALTH COACH NO CHARGE: CPT

## 2022-07-25 NOTE — LETTER
"    2022         RE: Alfred Rosario  [address] [place] [address]        Dear Colleague,    Thank you for referring your patient, Alfred Rosario, to the [facility] AND BARIATRICS CARE [place]. Please see a copy of my visit note below.    Reason for Visit: 24-Week Healthy Lifestyle Plan Follow up Visit - Health Coaching    Progress Notes:  Return 24-Week Healthy Lifestyle Plan Weight Management Health Coaching Note  Alfred Rosario   MRN: [id number]  : 1985  STEVEN: 2022  Health  Follow-up Visit #: 2a    ASSESSMENT:  Initial Start Date:  2022  Graduation Date:  2022  [employer] (online resource) enrollment date(s):  2022  Physician:  Dr. Knox  Referred by: PCP - Dr. Boswell  Initial Weight (lbs):  lbs.  Current Weight (lbs): 307 lbs.  Average Daily Water (ounces): - oz/day  Weight Loss Medication: Naltrexone  Nutrition Plan: real whole foods      note: Sent message to patient:  Moving today's appointment to 2 weeks from today: Monday, August 15 at 3:00 pm.  Several Monday's at 3pm \"on hold\" for Patient to determine which work best.  Patient cancelled appt with ARMEN on . Does he wish to reschedule that?       Previous Goals set by patient with ARMEN Mckeon on 22:  1. Have a protein source at each meal  2. Meal prep for 3 meals per day  3. Limit sweets intake  4. Time structured eatinam-5pm window to eat/consume (helps with late night snacking)    NOTES:  Works full-time for [employer]  Health issues - blood pressure, cardiac  Experience with losing weight in the past - has lost 80lbs - physical health related   to wife with 1.5 year old and 4 year old (2 girls)  1 dog - most consistent exercise is walking the dog 1-1.5 mile e/o day - helpful  Making time to exercise can be a struggle        FOLLOW-Up:   - cancelled with ARMEN Mckeon - reschedule?  8/10 with Dr. Knox f/u  8/15 with Alka  #2 " assessment      Reminder:  Monthly Healthy Lifestyle Support Groups offered virtually via TEAMS.  Contact Maya Vieira ([email or web address]) to be added to the invite list.  Upcoming:  (, 12:30pm to 1:30pm):     ~ Open Forum   ~ Special Guest Registered Dietician Angela Saldana   ~ Open Forum   ~ Gratitude Alka Pepper National Board Certified Health    ~ Navigating How to Eat Around the Holidays with ARMEN Doah Gabriel   ~ Changing your relationship with movement with  Marlyn National Board Certified Health        SIGNATURE:  MARYCHUY Brooks, [facility]  National Board-Certified Health &   24-Week Healthy Lifestyle Plan Health   [place] [facility]  email:  [email or web address]  appointment [phone]      Again, thank you for allowing me to participate in the care of your patient.        Sincerely,        Alka Guerrier

## 2022-07-25 NOTE — PROGRESS NOTES
"Reason for Visit: 24-Week Healthy Lifestyle Plan Follow up Visit - Health Coaching    Progress Notes:  Return 24-Week Healthy Lifestyle Plan Weight Management Health Coaching Note  Alfred Rosario   MRN: [id number]  : 1985  STEVEN: 2022  Health  Follow-up Visit #: 2a    ASSESSMENT:  Initial Start Date:  2022  Graduation Date:  2022  [facility] (online resource) enrollment date(s):  2022  Physician:  Dr. Knox  Referred by: PCP - Dr. Boswell  Initial Weight (lbs):  lbs.  Current Weight (lbs): 307 lbs.  Average Daily Water (ounces): - oz/day  Weight Loss Medication: Naltrexone  Nutrition Plan: real whole foods      note: Sent message to patient:  Moving today's appointment to 2 weeks from today: Monday, August 15 at 3:00 pm.  Several Monday's at 3pm \"on hold\" for Patient to determine which work best.  Patient cancelled appt with ARMEN on . Does he wish to reschedule that?       Previous Goals set by patient with ARMEN Mckeon on 22:  1. Have a protein source at each meal  2. Meal prep for 3 meals per day  3. Limit sweets intake  4. Time structured eatinam-5pm window to eat/consume (helps with late night snacking)    NOTES:  Works full-time for [employer]  Health issues - blood pressure, cardiac  Experience with losing weight in the past - has lost 80lbs - physical health related   to wife with 1.5 year old and 4 year old (2 girls)  1 dog - most consistent exercise is walking the dog 1-1.5 mile e/o day - helpful  Making time to exercise can be a struggle        FOLLOW-Up:   - cancelled with ARMEN Mckeon - reschedule?  8/10 with Dr. Knox f/u  8/15 with Alka ZAMORA #2 assessment      Reminder:  Monthly Healthy Lifestyle Support Groups offered virtually via TEAMS.  Contact Maya Vieira ([email or web address]) to be added to the invite list.  Upcoming:  (, 12:30pm to 1:30pm):     ~ Open Forum   ~ Special Guest Registered Dietician Angela" Les   ~ Open Forum   ~ Gratitude Alka Pepper National Board Certified Health    ~ Navigating How to Eat Around the Holidays with ARMEN Honey Gabriel   ~ Changing your relationship with movement with  Marlyn National Board Certified Health        SIGNATURE:  MARYCHUY Brooks, [facility]  National Board-Certified Health &   24-Week Healthy Lifestyle Plan Health   [place] [facility]  email:  [email or web address]  appointment [phone]

## 2022-08-15 ENCOUNTER — VIRTUAL VISIT (OUTPATIENT)
Dept: SURGERY | Facility: CLINIC | Age: 37
End: 2022-08-15
Payer: COMMERCIAL

## 2022-08-15 DIAGNOSIS — E66.9 OBESITY: Primary | ICD-10-CM

## 2022-08-15 PROCEDURE — 99207 PR MWM HEALTH COACH NO CHARGE: CPT

## 2022-08-15 NOTE — LETTER
"    8/15/2022         RE: Alfred Rosario  [address] [place] [address]        Dear Colleague,    Thank you for referring your patient, Alfred Rosario, to the [facility]RICS [facility] [place]. Please see a copy of my visit note below.    Reason for Visit: 24-Week Healthy Lifestyle Plan Follow up Visit - Health Coaching    Progress Notes:  Return 24-Week Healthy Lifestyle Plan Weight Management Health Coaching Note  Alfred Rosario   MRN: [id number]  : 1985  STEVEN: 08/15/2022  Health  Follow-up Visit #: 2b    ASSESSMENT:  Initial Start Date:  2022  Graduation Date:  2022  [employer] (online resource) enrollment date(s):  2022  Physician:  Dr. Knox  Referred by: PCP - Dr. Boswell  Initial Weight (lbs): 307 lbs.  Current Weight (lbs): 293 lbs weighs every couple days (15 lbs lost to date 8/15/22 ljs)  Average Daily Water (ounces): - oz/day  Weight Loss Medication: Naltrexone - discontinued due to not feeling well on it (more mandel) and not feeling like it was very effective. May try again at a future date.  Nutrition Plan: real whole foods       Discussion:  Nutrition: making small changes: cut out refined sugars and sweets. Still enjoys/likes carbs. Very few things like M&Ms. Work is the downfall as there is always sweets at work.  Exercise/Movement/Activity: treadmill sometimes but busy schedule with work and 2 kids  Sleep: sleeping better since losing 15 lbs., less tossing and turning  Other: feels better overall, general overall \"calm\"      Previous Goal(s) review:  Previous Goals set by patient with ARMEN Mckeon on 22:  1. Have a protein source at each meal  2. Meal prep for 3 meals per day  3. Limit sweets intake  4. Time structured eatinam-5pm window to eat/consume (helps with late night snacking)    GOALS established today by client:  Exercise/Movement: walks with his dog (older dog) so walk over by park, not " strenuous  Treadmill in their home - convienent. Choosing the day each week  Nutrition: planning ahead, frozen meals  Window of eatinam-4/5pm - looking at getting back to this in the coming weeks      NOTES:  Works full-time for [employer]  Health issues - blood pressure, cardiac  Experience with losing weight in the past - has lost 80lbs - physical health related   to wife with 1.5 year old and 4 year old (2 girls)  1 dog - most consistent exercise is walking the dog 1-1.5 mile e/o day - helpful  Making time to exercise can be a struggle      Follow-up appointments:     with Alka, HC #3   with ARMEN Mckeon f/u  Holding additional Mon or Tues at 3pm..  10/15 with Dr. Knox f/u        Reminder:  Monthly Healthy Lifestyle Support Groups offered virtually via TEAMS.  Contact Maya Vieira ([email or web address]) to be added to the invite list.  Upcoming:  (, 12:30pm to 1:30pm):     ~ Special Guest Registered Dietician Angela Saldana   ~ Open Forum   ~ Gratitude Alka Pepper National Board Certified Health    ~ Navigating How to Eat Around the Holidays with ARMEN Rios   ~ Changing your relationship with movement with  Marlyn National Board Certified Health        SIGNATURE:  MARYCHUY Brooks, [facility]  National Board-Certified Health &   24-Week Healthy Lifestyle Plan Health   [place] [facility]  email:  [email or web address]  appointment [phone]      Again, thank you for allowing me to participate in the care of your patient.        Sincerely,        Alka Guerrier

## 2022-08-15 NOTE — PROGRESS NOTES
"Reason for Visit: 24-Week Healthy Lifestyle Plan Follow up Visit - Health Coaching    Progress Notes:  Return 24-Week Healthy Lifestyle Plan Weight Management Health Coaching Note  Alfred Rosario   MRN: [id number]  : 1985  STEVEN: 08/15/2022  Health  Follow-up Visit #: 2b    ASSESSMENT:  Initial Start Date:  2022  Graduation Date:  2022  [employer] (online resource) enrollment date(s):  2022  Physician:  Dr. Knox  Referred by: PCP - Dr. Boswell  Initial Weight (lbs): 307 lbs.  Current Weight (lbs): 293 lbs weighs every couple days (15 lbs lost to date 8/15/22 ljs)  Average Daily Water (ounces): - oz/day  Weight Loss Medication: Naltrexone - discontinued due to not feeling well on it (more mandel) and not feeling like it was very effective. May try again at a future date.  Nutrition Plan: real whole foods       Discussion:  Nutrition: making small changes: cut out refined sugars and sweets. Still enjoys/likes carbs. Very few things like M&Ms. Work is the downfall as there is always sweets at work.  Exercise/Movement/Activity: treadmill sometimes but busy schedule with work and 2 kids  Sleep: sleeping better since losing 15 lbs., less tossing and turning  Other: feels better overall, general overall \"calm\"      Previous Goal(s) review:  Previous Goals set by patient with ARMEN Mckeon on 22:  1. Have a protein source at each meal  2. Meal prep for 3 meals per day  3. Limit sweets intake  4. Time structured eatinam-5pm window to eat/consume (helps with late night snacking)    GOALS established today by client:  Exercise/Movement: walks with his dog (older dog) so walk over by park, not strenuous  Treadmill in their home - convienent. Choosing the day each week  Nutrition: planning ahead, frozen meals  Window of eatinam-4/5pm - looking at getting back to this in the coming weeks      NOTES:  Works full-time for [employer]  Health issues - blood pressure, cardiac  Experience " with losing weight in the past - has lost 80lbs - physical health related   to wife with 1.5 year old and 4 year old (2 girls)  1 dog - most consistent exercise is walking the dog 1-1.5 mile e/o day - helpful  Making time to exercise can be a struggle      Follow-up appointments:     with Alka, HC #3   with ARMEN Mckeon f/u  Holding additional Mon or Tues at 3pm..  10/15 with Dr. Knox f/u        Reminder:  Monthly Healthy Lifestyle Support Groups offered virtually via TEAMS.  Contact Maya Vieira ([email or web address]) to be added to the invite list.  Upcoming:  (, 12:30pm to 1:30pm):     ~ Special Guest Registered Dietician Angela Saldana   ~ Open Forum   ~ GraAlka Rich National Board Certified Health    ~ Navigating How to Eat Around the Holidays with ARMEN Rios   ~ Changing your relationship with movement with  Marlyn National Board Certified Health        SIGNATURE:  MARYCHUY Brooks, [facility]  National Board-Certified Health &   24-Week Healthy Lifestyle Plan Health   [place] [facility]  email:  [email or web address]  appointment [phone]

## 2022-09-06 ENCOUNTER — VIRTUAL VISIT (OUTPATIENT)
Dept: SURGERY | Facility: CLINIC | Age: 37
End: 2022-09-06
Payer: COMMERCIAL

## 2022-09-06 DIAGNOSIS — E66.9 OBESITY: Primary | ICD-10-CM

## 2022-09-06 PROCEDURE — 99207 PR MWM HEALTH COACH NO CHARGE: CPT

## 2022-09-06 NOTE — LETTER
2022         RE: Alfred Rosario  [address] [place] [address]        Dear Colleague,    Thank you for referring your patient, Alfred Rosario, to the [facility]GERY [facility] BARIATRICS CARE [place]. Please see a copy of my visit note below.    Reason for Visit: 24-Week Healthy Lifestyle Plan Follow up Visit - Health Coaching    Progress Notes:  Return 24-Week Healthy Lifestyle Plan Weight Management Health Coaching Note  Alfred Rosario   MRN: [id number]  : 1985  STEVEN: 2022  Health  Follow-up Visit #: 3    ASSESSMENT:  Initial Start Date:  2022  Graduation Date:  2022  [employer] (online resource) enrollment date(s):  2022  Physician:  Dr. Knox  Referred by: PCP - Dr. Boswell  Initial Weight (lbs): 307 lbs.  Current Weight (lbs): 293 lbs weighs every couple days (15 lbs lost to date 8/15/22 ljs) slow and steady   Average Daily Water (ounces): - oz/day  Weight Loss Medication: Naltrexone - discontinued due to not feeling well on it (more mandel) and not feeling like it was very effective. May try again at a future date.  Nutrition Plan: real whole foods       Discussion:  Nutrition: planning helps and makes grab and go as easy as possible. Pack lunch for work majority of the time. 4 out 5 days.  Exercise/Movement/Activity: exercising more, workout after work for 30 min., riding bike more often after kids go down for the night.  Sleep: going well, especially if exercising right before bed  Other: more energy in the morning    Previous Goals set by patient with ARMEN Mckeon on 22:  1. Have a protein source at each meal  2. Meal prep for 3 meals per day  3. Limit sweets intake  4. Time structured eatinam-5pm window to eat/consume (helps with late night snacking)      Previous goals established on 8/15:  Exercise/Movement: walks with his dog (older dog) so walk over by park, not strenuous  Treadmill in their home - convienent.  Choosing the day each week  Nutrition: planning ahead, frozen meals  Window of eatinam-4/5pm - looking at getting back to this in the coming weeks      GOALS established today by client:  Exercise/Movement: e/o - looking 5 days/week   Nutrition: planning ahead, talking with ARMEN Mckeon on  regarding carbs and keeping those in check with low carbs.      NOTES:  Works full-time for [employer]  Health issues - blood pressure, cardiac  Experience with losing weight in the past - has lost 80lbs - physical health related   to wife with 1.5 year old and 4 year old (2 girls)  Family has had Covid 2x now (2nd time in July)  1 dog - most consistent exercise is walking the dog 1-1.5 mile e/o day - helpful  Making time to exercise can be a struggle      Follow-up appointments:     with ARMEN Mckeon f/u   with Alka, HC #4  10/10 with Alka, HC #5  10/12 with Dr. Knox f/u      Reminder:  Monthly Healthy Lifestyle Support Groups offered virtually via TEAMS.  Contact Maya Vieira ([email or web address]) to be added to the invite list.  Upcoming:  (, 12:30pm to 1:30pm):     ~ Open Forum   ~ Gratitude Alka Pepper National Board Certified Health    ~ Navigating How to Eat Around the Holidays with ARMEN Doah Gabriel   ~ Changing your relationship with movement with  Marlyn National Board Certified Health        SIGNATURE:  MARYCHUY Brooks, [facility]  National Board-Certified Health &   24-Week Healthy Lifestyle Plan Health   [place] [facility]  email:  [email or web address]  appointment [phone]      Again, thank you for allowing me to participate in the care of your patient.        Sincerely,        Alka Guerrier

## 2022-09-06 NOTE — PROGRESS NOTES
Reason for Visit: 24-Week Healthy Lifestyle Plan Follow up Visit - Health Coaching    Progress Notes:  Return 24-Week Healthy Lifestyle Plan Weight Management Health Coaching Note  Alfred Rosario   MRN: [id number]  : 1985  STEVEN: 2022  Health  Follow-up Visit #: 3    ASSESSMENT:  Initial Start Date:  2022  Graduation Date:  2022  [employer] (online resource) enrollment date(s):  2022  Physician:  Dr. Knox  Referred by: PCP - Dr. Boswell  Initial Weight (lbs): 307 lbs.  Current Weight (lbs): 293 lbs weighs every couple days (15 lbs lost to date 8/15/22 ljs) slow and steady   Average Daily Water (ounces): - oz/day  Weight Loss Medication: Naltrexone - discontinued due to not feeling well on it (more mandel) and not feeling like it was very effective. May try again at a future date.  Nutrition Plan: real whole foods       Discussion:  Nutrition: planning helps and makes grab and go as easy as possible. Pack lunch for work majority of the time. 4 out 5 days.  Exercise/Movement/Activity: exercising more, workout after work for 30 min., riding bike more often after kids go down for the night.  Sleep: going well, especially if exercising right before bed  Other: more energy in the morning    Previous Goals set by patient with ARMEN Mckeon on 22:  1. Have a protein source at each meal  2. Meal prep for 3 meals per day  3. Limit sweets intake  4. Time structured eatinam-5pm window to eat/consume (helps with late night snacking)      Previous goals established on 8/15:  Exercise/Movement: walks with his dog (older dog) so walk over by park, not strenuous  Treadmill in their home - convienent. Choosing the day each week  Nutrition: planning ahead, frozen meals  Window of eatinam-4/5pm - looking at getting back to this in the coming weeks      GOALS established today by client:  Exercise/Movement: e/o - looking 5 days/week   Nutrition: planning ahead, talking with ARMEN Mckeon on   regarding carbs and keeping those in check with low carbs.      NOTES:  Works full-time for [employer]  Health issues - blood pressure, cardiac  Experience with losing weight in the past - has lost 80lbs - physical health related   to wife with 1.5 year old and 4 year old (2 girls)  Family has had Covid 2x now (2nd time in July)  1 dog - most consistent exercise is walking the dog 1-1.5 mile e/o day - helpful  Making time to exercise can be a struggle      Follow-up appointments:     with ARMEN Mckeon f/u   with Alka, HC #4  10/10 with Alka, HC #5  10/12 with Dr. Knox f/u      Reminder:  Monthly Healthy Lifestyle Support Groups offered virtually via TEAMS.  Contact Maya Vieira ([email or web address]) to be added to the invite list.  Upcoming:  (, 12:30pm to 1:30pm):     ~ Open Forum   ~ Gratitude PracticesAlka National Board Certified Health    ~ Navigating How to Eat Around the Holidays with ARMEN Rios   ~ Changing your relationship with movement with  Marlyn National Board Certified Health        SIGNATURE:  MAYRCHUY Brooks, [facility]  National Board-Certified Health &   24-Week Healthy Lifestyle Plan Health   [place] [facility]  email:  [email or web address]  appointment [phone]

## 2022-09-09 ENCOUNTER — VIRTUAL VISIT (OUTPATIENT)
Dept: SURGERY | Facility: CLINIC | Age: 37
End: 2022-09-09
Payer: COMMERCIAL

## 2022-09-09 DIAGNOSIS — E66.01 MORBID OBESITY WITH BMI OF 40.0-44.9, ADULT (H): Primary | ICD-10-CM

## 2022-09-09 PROCEDURE — 97803 MED NUTRITION INDIV SUBSEQ: CPT | Mod: GT | Performed by: DIETITIAN, REGISTERED

## 2022-09-09 NOTE — PROGRESS NOTES
Alfred Rosario is a 37 year old who is being evaluated via a billable video visit.      How would you like to obtain your AVS? MyChart  If the video visit is dropped, the invitation should be resent by: Send to e-mail at: [email or web address]  Will anyone else be joining your video visit? No  no    Video Start Time: 3:00 PM      Medical  Weight Loss Follow-Up Diet Evaluation - 24 Week Program  Assessment:  Alfred is presenting today for a follow up weight management nutrition consultation. Pt has had an initial appointment with Dr. Knox  RD visit #2  Weight loss medication: Stopped taking naltrexone.   Pt's weight is 298 lb  Initial weight: 307  Weight change: down 9 lbs    No flowsheet data found.  BMI: There is no height or weight on file to calculate BMI.  Patient weight not recorded    Estimated RMR ([place]-St Jeor equation):  2353 kcals x 1.2 (sedentary) = 2824 kcals (for weight maintenance)  2353 kcals x 1.3 (light active) = 3236 kcals (for weight maintenance)  Recommended Protein Intake: 120-140 grams of protein/day  Patient Active Problem List:  Patient Active Problem List   Diagnosis     Hypertension, uncontrolled     Dilated cardiomyopathy (H)     LEE ANN (obstructive sleep apnea)     Morbid obesity with BMI of 40.0-44.9, adult (H)     Paroxysmal A-fib (H)     Benign essential hypertension     Dyslipidemia     Metabolic syndrome     Pre-diabetes     Hepatic steatosis     Diabetes: No    Progress on goals from last visit: He has been doing more meal planning lately, more than he ever has. He is struggling with turning to high sugar foods when stressed - started working with a therapist to manage stress. Breakfast can be hit or miss, and he finds the days he misses are worse when he gets home at 2 pm from work.   1. Have a protein source at each meal  2. Meal prep for 3 meals per day  3. Limit sweets intake    Dietary Recall:  Wakes up at 3 am  Breakfast: small can of diet coke - sometimes doesn't  bring breakfast with him, and then will eat his first meal around 8-10 am. He does better during the day when he does eat breakfast - he's trying to be more consistent with this.  Lunch: Has been in the routine of taking lunch that he meal prepped  Small can diet coke sometimes in afternoon  When he gets home (2 pm) will have a snack, usually carbs - bagel, chip  Dinner: 5 pm - sometimes a meal or grazing  Beverages:   Water  Milk: whole is what they have on hand with the kids  Nutrition Diagnosis:    Overweight/Obesity (NC 3.3) related to excessive calorie intake as evidenced by BMI 41    Intervention:  1. Food and/or nutrient delivery: High protein, 3 meals per day  2. Nutrition education: Discussed breakfast options and ideas  3. Nutrition counseling: Reviewed progress with goals, goal setting    Monitoring/Evaluation:    Goals:  1. Have a protein source at each meal  2. Meal prep for 3 meals per day, work on being more consistent with breakfast  3. Limit sweets intake  4. Switch out milk for la croix at dinner time    Pt will follow up with health  and 1.5 month(s) with dietitian.     Video-Visit Details    Type of service:  Video Visit    Video End Time:3:30 pm    Originating Location (pt. Location): Home    Distant Location (provider location):  [facility] SURGERY [facility] AND BARIATRICS CARE [place]     Platform used for Video Visit: Osorio Terrell

## 2022-09-09 NOTE — LETTER
9/9/2022         RE: Alfred Rosario  [address] [place] [address]        Dear Colleague,    Thank you for referring your patient, Alfred Rosario, to the [facility] SURGERY CLINIC AND BARIATRICS CARE [place]. Please see a copy of my visit note below.    Alfred Rosario is a 37 year old who is being evaluated via a billable video visit.      How would you like to obtain your AVS? MyChart  If the video visit is dropped, the invitation should be resent by: Send to e-mail at: [email or web address]  Will anyone else be joining your video visit? No  no    Video Start Time: 3:00 PM      Medical  Weight Loss Follow-Up Diet Evaluation - 24 Week Program  Assessment:  Alfred is presenting today for a follow up weight management nutrition consultation. Pt has had an initial appointment with Dr. Knox  RD visit #2  Weight loss medication: Stopped taking naltrexone.   Pt's weight is 298 lb  Initial weight: 307  Weight change: down 9 lbs    No flowsheet data found.  BMI: There is no height or weight on file to calculate BMI.  Patient weight not recorded    Estimated RMR (Lillian-St Jeor equation):  2353 kcals x 1.2 (sedentary) = 2824 kcals (for weight maintenance)  2353 kcals x 1.3 (light active) = 3236 kcals (for weight maintenance)  Recommended Protein Intake: 120-140 grams of protein/day  Patient Active Problem List:  Patient Active Problem List   Diagnosis     Hypertension, uncontrolled     Dilated cardiomyopathy (H)     LEE ANN (obstructive sleep apnea)     Morbid obesity with BMI of 40.0-44.9, adult (H)     Paroxysmal A-fib (H)     Benign essential hypertension     Dyslipidemia     Metabolic syndrome     Pre-diabetes     Hepatic steatosis     Diabetes: No    Progress on goals from last visit: He has been doing more meal planning lately, more than he ever has. He is struggling with turning to high sugar foods when stressed - started working with a therapist to manage stress. Breakfast can  be hit or miss, and he finds the days he misses are worse when he gets home at 2 pm from work.   1. Have a protein source at each meal  2. Meal prep for 3 meals per day  3. Limit sweets intake    Dietary Recall:  Wakes up at 3 am  Breakfast: small can of diet coke - sometimes doesn't bring breakfast with him, and then will eat his first meal around 8-10 am. He does better during the day when he does eat breakfast - he's trying to be more consistent with this.  Lunch: Has been in the routine of taking lunch that he meal prepped  Small can diet coke sometimes in afternoon  When he gets home (2 pm) will have a snack, usually carbs - bagel, chip  Dinner: 5 pm - sometimes a meal or grazing  Beverages:   Water  Milk: whole is what they have on hand with the kids  Nutrition Diagnosis:    Overweight/Obesity (NC 3.3) related to excessive calorie intake as evidenced by BMI 41    Intervention:  1. Food and/or nutrient delivery: High protein, 3 meals per day  2. Nutrition education: Discussed breakfast options and ideas  3. Nutrition counseling: Reviewed progress with goals, goal setting    Monitoring/Evaluation:    Goals:  1. Have a protein source at each meal  2. Meal prep for 3 meals per day, work on being more consistent with breakfast  3. Limit sweets intake  4. Switch out milk for la croix at dinner time    Pt will follow up with health  and 1.5 month(s) with dietitian.     Video-Visit Details    Type of service:  Video Visit    Video End Time:3:30 pm    Originating Location (pt. Location): Home    Distant Location (provider location):  [facility] SURGERY CLINIC AND BARIATRICS CARE [place]     Platform used for Video Visit: Osorio Terrell      Again, thank you for allowing me to participate in the care of your patient.        Sincerely,        Linda Terrell

## 2022-10-10 ENCOUNTER — VIRTUAL VISIT (OUTPATIENT)
Dept: SURGERY | Facility: CLINIC | Age: 37
End: 2022-10-10
Payer: COMMERCIAL

## 2022-10-10 DIAGNOSIS — E66.9 OBESITY (BMI 30-39.9): Primary | ICD-10-CM

## 2022-10-10 PROCEDURE — 97803 MED NUTRITION INDIV SUBSEQ: CPT | Mod: TEL | Performed by: DIETITIAN, REGISTERED

## 2022-10-10 NOTE — PROGRESS NOTES
Alfred Rosario is a 37 year old who is being evaluated via a billable  Telephone visit.      Medical  Weight Loss Follow-Up Diet Evaluation - 24 Week Program  Assessment:  Alfred is presenting today for a follow up weight management nutrition consultation. Pt has had an initial appointment with Dr. Knox  RD visit #3  Weight loss medication: Stopped taking naltrexone.   Pt's weight is 294 lb  Initial weight: 307 lb  Weight change: down 13 lbs    No flowsheet data found.  BMI: There is no height or weight on file to calculate BMI.  Patient weight not recorded    Estimated RMR ([place]ales-St Jeor equation):  2353 kcals x 1.2 (sedentary) = 2824 kcals (for weight maintenance)  2353 kcals x 1.3 (light active) = 3236 kcals (for weight maintenance)  Recommended Protein Intake: 120-140 grams of protein/day  Patient Active Problem List:  Patient Active Problem List   Diagnosis     Hypertension, uncontrolled     Dilated cardiomyopathy (H)     LEE ANN (obstructive sleep apnea)     Morbid obesity with BMI of 40.0-44.9, adult (H)     Paroxysmal A-fib (H)     Benign essential hypertension     Dyslipidemia     Metabolic syndrome     Pre-diabetes     Hepatic steatosis     Diabetes: No    Progress on goals from last visit: Getting a lot better about breakfast. Taking food with him to work and leaving it there to eat for breakfast has been working. Struggling with eating after 2 pm when he gets home from work. We discussed having a planned snack. Stress can be a trigger and using sweets as a reward at night time after a long day. He continues to meet with therapist to work on stress related eating.    1. Have a protein source at each meal - met  2. Meal prep for 3 meals per day, work on being more consistent with breakfast - met  3. Limit sweets intake - hasn't been keeping that in the house as much  4. Switch out milk for la croix at dinner time - met    Dietary Recall:  Wakes up at 3 am, has a protein shake (premiere) at 7  am  Breakfast: yogurt with granola  Lunch: 9 am - consistent with this 4/5 days. Has been in the routine of taking lunch that he meal prepped - meat, carrots and onions  Small can diet coke sometimes in afternoon  When he gets home (2 pm) will have a snack, usually carbs - bagel, chip  Dinner: 5 pm - sometimes a meal or grazing  Snack: craves sweets before bed - in his mind he views it as a reward for a long day, he would like to get away from this  Goes to bed at 8 pm  Beverages:   Water  Milk: decreased intake to 8 oz per day  [place]  Nutrition Diagnosis:    Overweight/Obesity (NC 3.3) related to excessive calorie intake as evidenced by BMI 39.87    Intervention:  1. Food and/or nutrient delivery: High protein, 3 meals per day  2. Nutrition education: Discussed snack in the afternoon, food as reward mindset  3. Nutrition counseling: Reviewed progress with goals, goal setting    Monitoring/Evaluation:    Goals:  5. Have a protein source at each meal  6. Meal prep for 3 meals per day, work on being more consistent with breakfast  7. Limit sweets intake  8. Switch out milk for la croix at dinner time    Pt will follow up with health  and 1.5 month(s) with dietitian.     Phone call duration: 20 minutes    Linda Terrell

## 2022-10-12 ENCOUNTER — VIRTUAL VISIT (OUTPATIENT)
Dept: SURGERY | Facility: CLINIC | Age: 37
End: 2022-10-12
Payer: COMMERCIAL

## 2022-10-12 VITALS — HEIGHT: 72 IN | BODY MASS INDEX: 39.55 KG/M2 | WEIGHT: 292 LBS

## 2022-10-12 DIAGNOSIS — E66.01 MORBID OBESITY WITH BMI OF 40.0-44.9, ADULT (H): Primary | ICD-10-CM

## 2022-10-12 DIAGNOSIS — R73.09 ELEVATED HEMOGLOBIN A1C: ICD-10-CM

## 2022-10-12 DIAGNOSIS — I48.0 PAROXYSMAL A-FIB (H): ICD-10-CM

## 2022-10-12 DIAGNOSIS — E88.810 METABOLIC SYNDROME: ICD-10-CM

## 2022-10-12 PROCEDURE — 99214 OFFICE O/P EST MOD 30 MIN: CPT | Mod: GT | Performed by: FAMILY MEDICINE

## 2022-10-12 RX ORDER — PEN NEEDLE, DIABETIC 30 GX5/16"
1 NEEDLE, DISPOSABLE MISCELLANEOUS DAILY
Qty: 100 EACH | Refills: 3 | Status: SHIPPED | OUTPATIENT
Start: 2022-10-12 | End: 2022-11-29

## 2022-10-12 RX ORDER — METFORMIN HCL 500 MG
500 TABLET, EXTENDED RELEASE 24 HR ORAL 2 TIMES DAILY WITH MEALS
Qty: 180 TABLET | Refills: 3 | Status: SHIPPED | OUTPATIENT
Start: 2022-10-12 | End: 2023-10-19

## 2022-10-12 NOTE — LETTER
10/12/2022         RE: Alfred Rosario  [address] [place] [address]        Dear Colleague,    Thank you for referring your patient, Alfred Rosario, to the [facility] SURGERY CLINIC AND BARIATRICS CARE [place]. Please see a copy of my visit note below.    Alfred Rosario is 37 year old  male who presents for a billable video visit today.    How would you like to obtain your AVS? MyChart  If dropped from the video visit, the video invitation should be resent by: Send to e-mail at: [email or web address]  Will anyone else be joining your video visit? No      Video Start Time: 3:00 pm    Are there any specific questions or needs that you would like addressed at your visit today?     Weight Management. Pt was supposed to do labs but is positive for covid so he wasn't able to complete his labs. Pt doesn't need refills at this time.     Pt would like to discuss taking Naltrexone- pt feels that it effected his mood. He did stop taking it on his own about around June. He's not against taking it but he does want to re-discuss it.      Gene Flores CMA  [facility]  Surgery [facility] [place]  Weight Management Clinic [address] [address]  Office: [phone]  Fax: [phone]          Bariatric Follow-up    37 year old  male BMI:Body mass index is 39.6 kg/m .    Weight:   Wt Readings from Last 1 Encounters:   10/12/22 132.5 kg (292 lb)    pounds    Comorbidities:  Patient Active Problem List   Diagnosis     Hypertension, uncontrolled     Dilated cardiomyopathy (H)     LEE ANN (obstructive sleep apnea)     Morbid obesity with BMI of 40.0-44.9, adult (H)     Paroxysmal A-fib (H)     Benign essential hypertension     Dyslipidemia     Metabolic syndrome     Pre-diabetes     Hepatic steatosis       Interim: 15# weight loss during the 24 week program. Has COVID currently for the 3rd time. He had the shot and  "the booster pre-covid. Doing the treadmill more . On the bike for the first time in 10 years. Better with meal prep. Bringing meals to work. A lot of carrots, onion, chicken or beef in the crockpot.   Struggling with sweet cravings. Naltrexone caused moodiness. Did help decrease late night eating. Wife noticed bad moods as well. Was taking the Wellbutrin at the same time.    Plan:  DIET  Protein first, restorative sleep, stress management will help decrease cravings. If able to get GLP-1 RA, this will be particularly helpful. Continue to avoid stimulants d/t HF.    EXERCISE continue   PHARMACOTHERAPY Prefer Wegovy, however, not available. Will RX Liraglutide which is excellent though daily injection VS weekly. Once up to 3.0mg then change to Semaglutide 1.7mg for 1 month then 2.4mg ongoing In addition to his blood sugars, weight, this may positively affect his hepatic steatosis and offer cardioprotection.    Congratulaed on 15# weight loss. This is 5%. Will work toward changing from whole to lower fat milk, increasing whole foods, less processed foods, lower calorie dense snacks, monitoring physical activity and continuing to increase as able. Await. PA decision on Liraglutide. With low HDL, high triglycerides, Metformin long term will likely be beneficial. Discussed insulin resistance and importance of restorative sleep, stress management, \"taking a walk a day\" and eating protein first.      -We reviewed his medications and whether associated with weight gain.    Current Outpatient Medications:      carvedilol (COREG) 25 MG tablet, Take 1.5 tablets (37.5 mg) by mouth 2 times daily (with meals), Disp: 270 tablet, Rfl: 3     docosahexaenoic acid-epa 120-180 mg cap, [DOCOSAHEXAENOIC ACID--180 MG CAP] Take 2 g by mouth daily., Disp: , Rfl:      furosemide (LASIX) 20 MG tablet, TAKE 1 TABLET (20 MG) BY MOUTH DAILY AS NEEDED (INCREASE WT GAIN), Disp: 90 tablet, Rfl: 1     Insulin Pen Needle (PEN NEEDLES " "3/16\") 31G X 5 MM MISC, 1 Device daily, Disp: 100 each, Rfl: 3     liraglutide - Weight Management (SAXENDA) 18 MG/3ML pen, Inject 0.6 mg Subcutaneous daily for 7 days, THEN 1.2 mg daily for 7 days, THEN 1.8 mg daily for 7 days, THEN 2.4 mg daily for 7 days, THEN 3 mg daily., Disp: 45 mL, Rfl: 3     melatonin 5 MG tablet, Take 5 mg by mouth nightly as needed for sleep, Disp: , Rfl:      metFORMIN (GLUCOPHAGE XR) 500 MG 24 hr tablet, Take 1 tablet (500 mg) by mouth 2 times daily (with meals), Disp: 180 tablet, Rfl: 3     multivitamin (MEN'S MULTI-VITAMIN) per tablet, [MULTIVITAMIN (MEN'S MULTI-VITAMIN) PER TABLET] Take 1 tablet by mouth daily., Disp: , Rfl:      sacubitril-valsartan (ENTRESTO)  MG per tablet, Take 1 tablet by mouth 2 times daily, Disp: 60 tablet, Rfl: 11     valsartan (DIOVAN) 320 MG tablet, Take 1 tablet (320 mg) by mouth daily, Disp: 90 tablet, Rfl: 3     rivaroxaban ANTICOAGULANT (XARELTO) 20 MG TABS tablet, Take 1 tablet (20 mg) by mouth daily (with dinner) Recommend starting this until follow-up with your primary care physician., Disp: 30 tablet, Rfl: 0      We discussed [facility] Bariatric Basics including:  -eating 3 meals daily  -eating protein first  -eating slowly, chewing food well  -avoiding/limiting calorie containing beverages  -choosing wheat, not white with breads, crackers, pastas, nik, bagels, tortillas, rice  -limiting restaurant or cafeteria eating to twice a week or less  -We discussed the importance of restorative sleep and stress management in maintaining a healthy weight.  -We discussed insulin resistance and glycemic index as it relates to appetite and weight control  -We discussed the National Weight Control Registry healthy weight maintenance strategies and ways to optimize metabolism.  -We discussed the importance of physical activity including cardiovascular and strength training in maintaining a healthier weight and explored viable options.    Most recent " labs:  Lab Results   Component Value Date    WBC 27.9 (H) 05/05/2022    HGB 13.1 (L) 05/05/2022    HCT 39.6 (L) 05/05/2022    MCV 90 05/05/2022     05/05/2022     Lab Results   Component Value Date    CHOL 191 02/09/2011     Lab Results   Component Value Date    HDL 43 03/27/2012       Lab Results   Component Value Date    TRIG 162 (H) 03/27/2012     Lab Results   Component Value Date    ALT 23 05/04/2022    AST 21 05/04/2022    ALKPHOS 53 05/04/2022     Lab Results   Component Value Date    TSH 2.80 02/10/2011         DIETARY HISTORY  Meals Per Day: 3  Eating Protein First?: starting, yes  Food Diary: B:fruit or toast with butter and sparkling water or bagel.  L:crock pot leftovers D:chicken baked and past dish and veggies.   Snacks Per Day: sweets  Typical Snack: nuts, fruit, craisins, cheese,  Ice cream  Fluid Intake: intnetional  Portion Control: improved  Calorie Containing Beverages: no juice. No milk, cut down on whole milk  Alcohol per week: no  Typical Protein Food Choices: variety  Choosing Whole Grains: yes  Grocery Shopping is done by: himself  Food Preparation is done by: himself  Meals at Restaurant/Cafeteria/Take Out Per Week: 1  Eating at the Table: yes  TV is Off During Meals: yes    Positive Changes Since Last Visit: maintaining 15# weight loss  Struggling With: sweet cravings.     Knowledgeable in Reading Food Labels:   Getting Adequate Protein: likely  Sleeping 7-8 hours/day yes with CPAP  Stress management 24 week program    PHYSICAL ACTIVITY PATTERNS:  Cardiovascular: Biking for first time in years. Walking some.  Strength Training: ADLs    REVIEW OF SYSTEMS  GENERAL/CONSTITUTIONAL:  Fatigue: yes  CARDIOVASCULAR:  Chest Pain with Exertion: no  PULMONARY:  Dyspnea on exertion: yes  CPAP Use: yes  Tobacco Use: no  GASTROINTESTINAL:  GERD/Heartburn:   UROLOGIC:  Urinary Symptoms:   NEUROLOGIC:  Headaches:   Paresthesias:   PSYCHIATRIC:  Moods:  euthymic  MUSCULOSKELETAL/RHEUMATOLOGIC  Arthralgias: yes  Myalgias: yes  ENDOCRINE:  Monitoring Blood Sugars: no  Sugars Well Controlled: yes  DERMATOLOGIC:  Rashes:     PHYSICAL EXAM: (most recent vitals and today's stated weight)  Vitals: Ht 1.829 m (6')   Wt 132.5 kg (292 lb)   BMI 39.60 kg/m        GEN: Pleasant and in no acute distress  PSYCH: A&OX3,     I have reviewed the note as documented above.  This accurately captures the substance of my conversation with the patient.  Thank you for the opportunity to participate in the care of your patient.    Bailey Knox MD, FAAFP  [facility]  Diplomate, American Board of Obesity Medicine    Total time spent on the date of this encounter doing: chart review, review of test results, patient visit, physical exam, education, counseling, developing plan of care, and documenting = 30 minutes.          Video-Visit Details    Type of service:  Video Visit    Originating Location (pt. Location): Home    Distant Location (provider location):  [facility] SURGERY [facility] AND BARIATRICS CARE [place]     Platform used for Video Visit: Osorio        Again, thank you for allowing me to participate in the care of your patient.        Sincerely,        Bailey Knox MD

## 2022-10-12 NOTE — PATIENT INSTRUCTIONS
MEDICATIONS FOR WEIGHT LOSS    PHENTERMINE (Adipex): approved in 1959 for appetite suppression.  It has stimulant effects and cannot be used with Ritalin, Concerta, or other stimulants.  It is not addictive although it's chemically related to amphetamines.  Amphetamines are addictive. The most common side effects are dry mouth, increased energy and concentration, increased pulse, and constipation.  You should not take phentermine if you have glaucoma, hyperthyroidism, or uncontrolled/untreated hypertension.  $24-$30 for 90 tablets    PHENDIMETRAZINE (Bontril): Appetite suppressant/sympathomimetic.  Controlled substance.  Side effects and contraindications similar to phentermine.  $45-$60 for 3 month supply    TOPIRAMATE (Topamax): Anti-seizure medication, also used to prevent migraines.  Side effects include paresthesia, glaucoma, altered concentration, attention difficulties, memory and speech problems, metabolic acidosis, depression, increase in body temperature and decrease sweating, kidney stones, and weight loss.  Do not take Topamax while taking Depakote as this can cause high ammonia levels.  You must have reliable birth control as Topamax can cause birth defects.  Discontinue slowly to avoid seizure.  Insurance usually covers Topiramate.    QSYMIA (Phentermine + Topamax):  See above information about phentermine and Topamax.  Most common side effects are paresthesia, dizziness, distortion of taste, insomnia, constipation, and dry mouth.  $150-$220 per month    DIETHYLPROPION (Tenuate): Sympathomimetic amine.  Appetite suppressant.  Doses 25 mg before meals or 75 mg per day.  Most common side effects are hypertension, palpitations, EKG changes, and increased seizures in epileptics.  There can be a possible adverse reaction with alcohol.  $70-$90 per 3 months    XENICAL(Orlistat) (Samy OTC): Approved in 1999.  A fat-blocker.  It reduces absorption of fat by approximately 30%.  It has beneficial effects on  lipid levels.  Side effects include diarrhea, abdominal cramping, fecal incontinence, oily spotting, and flatus with discharge.  Side effects are minimized if the patient limits their dietary fat to no more than 30% of their diet.  Patients must take a multivitamin daily to avoid vitamin D, E, A, and K deficiency.  $120 per month    CONTRAVE (Naltrexone/Bupropion): Approved in 2014.  It is a combination pill including an opioid receptor blocker and a long-standing antidepressant.  Most common side effects include nausea, constipation, headache, vomiting, dizziness, trouble sleeping, dry mouth, and diarrhea.  With all antidepressants watch for mood changes and suicide ideation.  Bupropion has been known to lower the seizure threshold in those prone to seizures.  It should not be used in a patient with a recent history of bulimia. It has been associated with liver damage from taking higher than recommended doses.  Do not use countrave if you have taken opioid medications or opioid street drugs in the past 7-10 days, if you are currently on opioids, methadone, or if you are pregnant.  Do not use contrave if you have recently stopped using alcohol or benzodiazepines.  Taper off contrave slowly.  Dosing: titrate up to 2 tablets twice daily of the Naltrexone 8 mg/ Bupropion 90 mg tablets.  $200 for 90 tablets    SAXENDA (Liraglutide): A daily injectable (3mg daily) medication used for type 2 diabetes (Victoza). Glucagon-like peptide-1 (GLP-1) agonist. Contraindications include personal or family history of medullary thyroid cancer or MEN type 2. Acute pancreatitis has been observed in patients taking liraglutide. Liraglutide causes C-cell tumors in rats and mice. It is unknown whether liraglutide causes tumors in humans. Start at 0.6mg, increasing the dose weekly up to 3mg.     TRULICITY (Dulaglutide): A weekly injectable (4.5mg weekly) medication used for type 2 diabetes. Glucagon-like peptide-1(GLP-1) agonist.  Contraindications include personal or family history of medullary thyroid cancer or MEN type 2. Acute pancreatitis has been observed in patients taking liraglutide. Dulaglutide causes C-cell tumors in rats and mice. It is unknown whether liraglutide causes tumors in humans. Start at 0.75 mg, 1.5 mg, 3.0 mg, to 4.5 mg increasing the dose monthly.      VYVANSE (Lisdexamfetamine dimesylate): a CNS stimulant used to treat ADHD. Indicated for the treatment of moderate to severe Binge Eating Disorder in Adults. Contraindicated in patients with known heart disease, structural abnormalities of the heart, serious heart arrhythmias or unexplained syncope. CNS stimulants such as vyvanse may cause manic or psychotic symptoms in patients with BPAD or pre-existing psychosis. Use with caution in patients with Raynaud's phenomenon. Most common side effects include dry mouth, insomnia, decreased appetite, increased heart rate, jittery feeling, constipation and anxiety.     WEGOVY (Semaglutide): A weekly injectable (2.4mg weekly) medication used for type 2 diabetes (Ozempic). Glucagon-like peptide-1 (GLP-1) agonist. Contraindications include personal or family history of medullary thyroid cancer or MEN type 2. Acute pancreatitis has been observed in patients taking Semaglutide. Semaglutide causes C-cell tumors in rats and mice. It is unknown whether Semaglutide causes tumors in humans. Start at 0.25mg, increasing to 0.5, 1.0, 1.7 then 2.4 monthly.     MOUNJARO (Tirzepatide): A weekly injectable medication indicated for type 2 diabetes. Glucagon-like-peptide-1 (GLP-1) agonist and Glucose-Dependent Insulinotropic Polypeptide (GIP) agonist. Contraindications include personal or family history of medullary thyroid cancer or MEN type 2. Acute pancreatitis has been observed in patients taking Tirzepatide. Tirzepatide causes C-cell tumors in rats and mice. It is unknown whether Tirzepatide causes tumors in humans. Watch for neck mass,  difficulty swallowing, persistent hoarseness, and epigastric abdominal pain. Most common side effects include nausea, diarrhea, vomiting, constipation, dyspepsia, and abdominal pain. Start at 2.5mg, increasing to 5.0, 7.5, 10, 12.5 then 15mg monthly.

## 2022-10-12 NOTE — PROGRESS NOTES
Alfred Rosario is 37 year old  male who presents for a billable video visit today.    How would you like to obtain your AVS? MyChart  If dropped from the video visit, the video invitation should be resent by: Send to e-mail at: [email or web address]  Will anyone else be joining your video visit? No      Video Start Time: 3:00 pm    Are there any specific questions or needs that you would like addressed at your visit today?     Weight Management. Pt was supposed to do labs but is positive for covid so he wasn't able to complete his labs. Pt doesn't need refills at this time.     Pt would like to discuss taking Naltrexone- pt feels that it effected his mood. He did stop taking it on his own about around June. He's not against taking it but he does want to re-discuss it.      Gene Flores  [facility]  [facility]  Weight Management Clinic [address]  [place] [address]  Office: [phone]  Fax: [phone]          Bariatric Follow-up    37 year old  male BMI:Body mass index is 39.6 kg/m .    Weight:   Wt Readings from Last 1 Encounters:   10/12/22 132.5 kg (292 lb)    pounds    Comorbidities:  Patient Active Problem List   Diagnosis     Hypertension, uncontrolled     Dilated cardiomyopathy (H)     LEE ANN (obstructive sleep apnea)     Morbid obesity with BMI of 40.0-44.9, adult (H)     Paroxysmal A-fib (H)     Benign essential hypertension     Dyslipidemia     Metabolic syndrome     Pre-diabetes     Hepatic steatosis       Interim: 15# weight loss during the 24 week program. Has COVID currently for the 3rd time. He had the shot and the booster pre-covid. Doing the treadmill more . On the bike for the first time in 10 years. Better with meal prep. Bringing meals to work. A lot of carrots, onion, chicken or beef in the crockpot.   Struggling with sweet cravings. Naltrexone caused moodiness. Did help decrease late  "night eating. Wife noticed bad moods as well. Was taking the Wellbutrin at the same time.    Plan:  DIET  Protein first, restorative sleep, stress management will help decrease cravings. If able to get GLP-1 RA, this will be particularly helpful. Continue to avoid stimulants d/t HF.    EXERCISE continue   PHARMACOTHERAPY Prefer Wegovy, however, not available. Will RX Liraglutide which is excellent though daily injection VS weekly. Once up to 3.0mg then change to Semaglutide 1.7mg for 1 month then 2.4mg ongoing In addition to his blood sugars, weight, this may positively affect his hepatic steatosis and offer cardioprotection.    Congratulaed on 15# weight loss. This is 5%. Will work toward changing from whole to lower fat milk, increasing whole foods, less processed foods, lower calorie dense snacks, monitoring physical activity and continuing to increase as able. Await. PA decision on Liraglutide. With low HDL, high triglycerides, Metformin long term will likely be beneficial. Discussed insulin resistance and importance of restorative sleep, stress management, \"taking a walk a day\" and eating protein first.      -We reviewed his medications and whether associated with weight gain.    Current Outpatient Medications:      carvedilol (COREG) 25 MG tablet, Take 1.5 tablets (37.5 mg) by mouth 2 times daily (with meals), Disp: 270 tablet, Rfl: 3     docosahexaenoic acid-epa 120-180 mg cap, [DOCOSAHEXAENOIC ACID--180 MG CAP] Take 2 g by mouth daily., Disp: , Rfl:      furosemide (LASIX) 20 MG tablet, TAKE 1 TABLET (20 MG) BY MOUTH DAILY AS NEEDED (INCREASE WT GAIN), Disp: 90 tablet, Rfl: 1     Insulin Pen Needle (PEN NEEDLES 3/16\") 31G X 5 MM MISC, 1 Device daily, Disp: 100 each, Rfl: 3     liraglutide - Weight Management (SAXENDA) 18 MG/3ML pen, Inject 0.6 mg Subcutaneous daily for 7 days, THEN 1.2 mg daily for 7 days, THEN 1.8 mg daily for 7 days, THEN 2.4 mg daily for 7 days, THEN 3 mg daily., Disp: 45 " mL, Rfl: 3     melatonin 5 MG tablet, Take 5 mg by mouth nightly as needed for sleep, Disp: , Rfl:      metFORMIN (GLUCOPHAGE XR) 500 MG 24 hr tablet, Take 1 tablet (500 mg) by mouth 2 times daily (with meals), Disp: 180 tablet, Rfl: 3     multivitamin (MEN'S MULTI-VITAMIN) per tablet, [MULTIVITAMIN (MEN'S MULTI-VITAMIN) PER TABLET] Take 1 tablet by mouth daily., Disp: , Rfl:      sacubitril-valsartan (ENTRESTO)  MG per tablet, Take 1 tablet by mouth 2 times daily, Disp: 60 tablet, Rfl: 11     valsartan (DIOVAN) 320 MG tablet, Take 1 tablet (320 mg) by mouth daily, Disp: 90 tablet, Rfl: 3     rivaroxaban ANTICOAGULANT (XARELTO) 20 MG TABS tablet, Take 1 tablet (20 mg) by mouth daily (with dinner) Recommend starting this until follow-up with your primary care physician., Disp: 30 tablet, Rfl: 0      We discussed [facility] Bariatric Basics including:  -eating 3 meals daily  -eating protein first  -eating slowly, chewing food well  -avoiding/limiting calorie containing beverages  -choosing wheat, not white with breads, crackers, pastas, nik, bagels, tortillas, rice  -limiting restaurant or cafeteria eating to twice a week or less  -We discussed the importance of restorative sleep and stress management in maintaining a healthy weight.  -We discussed insulin resistance and glycemic index as it relates to appetite and weight control  -We discussed the National Weight Control Registry healthy weight maintenance strategies and ways to optimize metabolism.  -We discussed the importance of physical activity including cardiovascular and strength training in maintaining a healthier weight and explored viable options.    Most recent labs:  Lab Results   Component Value Date    WBC 27.9 (H) 05/05/2022    HGB 13.1 (L) 05/05/2022    HCT 39.6 (L) 05/05/2022    MCV 90 05/05/2022     05/05/2022     Lab Results   Component Value Date    CHOL 191 02/09/2011     Lab Results   Component Value Date    HDL 43 03/27/2012        Lab Results   Component Value Date    TRIG 162 (H) 03/27/2012     Lab Results   Component Value Date    ALT 23 05/04/2022    AST 21 05/04/2022    ALKPHOS 53 05/04/2022     Lab Results   Component Value Date    TSH 2.80 02/10/2011         DIETARY HISTORY  Meals Per Day: 3  Eating Protein First?: starting, yes  Food Diary: B:fruit or toast with butter and sparkling water or bagel.  L:crock pot leftovers D:chicken baked and past dish and veggies.   Snacks Per Day: sweets  Typical Snack: nuts, fruit, craisins, cheese,  Ice cream  Fluid Intake: intnetional  Portion Control: improved  Calorie Containing Beverages: no juice. No milk, cut down on whole milk  Alcohol per week: no  Typical Protein Food Choices: variety  Choosing Whole Grains: yes  Grocery Shopping is done by: himself  Food Preparation is done by: himself  Meals at Restaurant/Cafeteria/Take Out Per Week: 1  Eating at the Table: yes  TV is Off During Meals: yes    Positive Changes Since Last Visit: maintaining 15# weight loss  Struggling With: sweet cravings.     Knowledgeable in Reading Food Labels:   Getting Adequate Protein: likely  Sleeping 7-8 hours/day yes with CPAP  Stress management 24 week program    PHYSICAL ACTIVITY PATTERNS:  Cardiovascular: Biking for first time in years. Walking some.  Strength Training: ADLs    REVIEW OF SYSTEMS  GENERAL/CONSTITUTIONAL:  Fatigue: yes  CARDIOVASCULAR:  Chest Pain with Exertion: no  PULMONARY:  Dyspnea on exertion: yes  CPAP Use: yes  Tobacco Use: no  GASTROINTESTINAL:  GERD/Heartburn:   UROLOGIC:  Urinary Symptoms:   NEUROLOGIC:  Headaches:   Paresthesias:   PSYCHIATRIC:  Moods: euthymic  MUSCULOSKELETAL/RHEUMATOLOGIC  Arthralgias: yes  Myalgias: yes  ENDOCRINE:  Monitoring Blood Sugars: no  Sugars Well Controlled: yes  DERMATOLOGIC:  Rashes:     PHYSICAL EXAM: (most recent vitals and today's stated weight)  Vitals: Ht 1.829 m (6')   Wt 132.5 kg (292 lb)   BMI 39.60 kg/m        GEN: Pleasant and in no  acute distress  PSYCH: A&OX3,     I have reviewed the note as documented above.  This accurately captures the substance of my conversation with the patient.  Thank you for the opportunity to participate in the care of your patient.    Bailey Knox MD, FAAFP  [facility]  Diplomate, American Board of Obesity Medicine    Total time spent on the date of this encounter doing: chart review, review of test results, patient visit, physical exam, education, counseling, developing plan of care, and documenting = 30 minutes.          Video-Visit Details    Type of service:  Video Visit    Originating Location (pt. Location): Home    Distant Location (provider location):  [facility]     Platform used for Video Visit: Osorio

## 2022-10-14 RX ORDER — RIVAROXABAN 20 MG/1
TABLET, FILM COATED ORAL
Qty: 90 TABLET | Refills: 1 | Status: SHIPPED | OUTPATIENT
Start: 2022-10-14 | End: 2023-03-31

## 2022-10-22 ENCOUNTER — HEALTH MAINTENANCE LETTER (OUTPATIENT)
Age: 37
End: 2022-10-22

## 2022-10-24 ENCOUNTER — VIRTUAL VISIT (OUTPATIENT)
Dept: SURGERY | Facility: CLINIC | Age: 37
End: 2022-10-24
Payer: COMMERCIAL

## 2022-10-24 DIAGNOSIS — E66.9 OBESITY: Primary | ICD-10-CM

## 2022-10-24 PROCEDURE — 99207 PR MWM HEALTH COACH NO CHARGE: CPT

## 2022-10-24 NOTE — LETTER
10/24/2022         RE: Alfred Rosario  [address] [place] [address]        Dear Colleague,    Thank you for referring your patient, Alfred Rosario, to the [facility]ATR[facility] [place]. Please see a copy of my visit note below.    Reason for Visit: 24-Week Healthy Lifestyle Plan Follow up Visit - Health Coaching    Progress Notes:  Return 24-Week Healthy Lifestyle Plan Weight Management Health Coaching Note  Alfred Rosario   MRN: [id number]  : 1985  STEVEN: 10/24/2022  Health  Follow-up Visit #: 4    ASSESSMENT:  Initial Start Date:  2022  Graduation Date:  2022  [employer] (online resource) enrollment date(s):  2022  Physician:  Dr. Knox  Referred by: PCP - Dr. Boswell  Initial Weight (lbs): 307 lbs.  Current Weight (lbs): 289 lbs (reported with Dr. Knox on 10/12) weighs every couple days slow and steady   Average Daily Water (ounces): - oz/day (drinks more water at work)  Weight Loss Medication: Naltrexone - discontinued due to not feeling well on it (more mandel) and not feeling like it was very effective. May try again at a future date.  October: Started Merformin per Dr. Knox  Nutrition Plan: real whole foods       Conversation today:  Nutrition: meal prep going well. Bringing meals to work. Been eating breakfast more and more. Taking hard-boiled eggs.  Has learned a lot and following recommendations   Exercise/Movement/Activity: been doing stairs at work with co-workers 1/2 of the time      Previous Goal(s) review:  Previous goals established on 8/15:  Exercise/Movement: walks with his dog (older dog) so walk over by park, not strenuous  Treadmill in their home - convienent. Choosing the day each week  Nutrition: planning ahead, frozen meals  Window of eatinam-4/5pm - looking at getting back to this in the coming weeks        GOALS establishe previously by client:  Exercise/Movement: e/o - looking 5 days/week  "  Nutrition: planning ahead, talking with ARMEN Mckeon on 9/9 regarding carbs and keeping those in check with low carbs.      GOALS established today 10/24 by client:  Sleep: read thru Sleep resources send by health [facility]  Nutrition: continue to plan ahead, incorporate veggies and focus on protein first. Low carbs  Increase water intake at home, days off from work.  Exercise: continue with regular movement, now incorporated doing stairs at work 50% of the time with co-workers during lunch breaks    Resources sent via [employer] on 10/24 regarding Sleep:    Overview: Sleep    [email or web address]     Sleep Diary   [email or web address]     Tips for Sleep Hygiene   [email or web address]     Do NOT worry about your sleep   [email or web address]     Your Calming Toolkit   [email or web address]      Making Healthy Changes for Sleep [email or web address]       NOTES:  Works full-time for [employer]  Health issues - blood pressure, cardiac  Experience with losing weight in the past - has lost 80lbs - physical health related   to wife with 1.5 year old and 4 year old (2 girls)  Family has had Covid 2x now (2nd time in July)  1 dog - most consistent exercise is walking the dog 1-1.5 mile e/o day - helpful  Making time to exercise can be a struggle - has added in stairs at work.      Follow-up appointments:    11/8 with [facility] #5  11/28 with [facility] #6  12/5 with ARMEN Mckeon f/u  January 2023 medication check with Dr. Knox (will message earlier if needed)    Reminder:  Monthly Healthy Lifestyle Support Groups offered virtually via TEAMS.  Contact Maya Vieira ([email or web address]) to be added to the invite list.  Upcoming:  (Fridays, 12:30pm to 1:30pm):    2022 Meetings  October 28th: Guest Speaker: JANEY Brooks-[facility], Health , \"Gratitude Practices\"  November 18: Guest Speaker: Honey Rios RD Registered Dietitian, \"Navigating How to " "Eat around the Holidays\"  : Guest Speaker: Marlyn Hawkins, [facility], \"Changing Your Relationship with Movement\"     Meetings  : \"Let's Talk\" a time for the group to share  : Guest Speaker, Honey Blanco RD, Registered Dietician, \"Tips to Maximize Your Metabolism\"  : Let's Talk\" a time for the group to share  : Guest Speaker, Angela Saldana RD, Registered Dietician, \"Heart Health\"  May 19: \"Let's Talk\" a time for the group to share       SIGNATURE:  MARYCHUY Brooks, [facility]  National Board-Certified Health &   24-Week Healthy Lifestyle Plan Health   [facility] Weight Management Program  email:  [email or web address]  appointment [phone]        Again, thank you for allowing me to participate in the care of your patient.        Sincerely,        Alka Guerrier    "

## 2022-10-24 NOTE — PROGRESS NOTES
Reason for Visit: 24-Week Healthy Lifestyle Plan Follow up Visit - Health Coaching    Progress Notes:  Return 24-Week Healthy Lifestyle Plan Weight Management Health Coaching Note  Alfred Rosario   MRN: [id number]  : 1985  STEVEN: 10/24/2022  Health  Follow-up Visit #: 4    ASSESSMENT:  Initial Start Date:  2022  Graduation Date:  2022  [employer] (online resource) enrollment date(s):  2022  Physician:  Dr. Knox  Referred by: PCP - Dr. Boswell  Initial Weight (lbs): 307 lbs.  Current Weight (lbs): 289 lbs (reported with Dr. Knox on 10/12) weighs every couple days slow and steady   Average Daily Water (ounces): - oz/day (drinks more water at work)  Weight Loss Medication: Naltrexone - discontinued due to not feeling well on it (more mandel) and not feeling like it was very effective. May try again at a future date.  October: Started Merformin per Dr. Knox  Nutrition Plan: real whole foods       Conversation today:  Nutrition: meal prep going well. Bringing meals to work. Been eating breakfast more and more. Taking hard-boiled eggs.  Has learned a lot and following recommendations   Exercise/Movement/Activity: been doing stairs at work with co-workers 1/2 of the time      Previous Goal(s) review:  Previous goals established on 8/15:  Exercise/Movement: walks with his dog (older dog) so walk over by park, not strenuous  Treadmill in their home - convienent. Choosing the day each week  Nutrition: planning ahead, frozen meals  Window of eatinam-4/5pm - looking at getting back to this in the coming weeks        GOALS establishe previously by client:  Exercise/Movement: e/o - looking 5 days/week   Nutrition: planning ahead, talking with ARMEN Mckeon on  regarding carbs and keeping those in check with low carbs.      GOALS established today 10/24 by client:  Sleep: read thru Sleep resources send by health [facility]  Nutrition: continue to plan ahead, incorporate veggies and focus on  "protein first. Low carbs  Increase water intake at home, days off from work.  Exercise: continue with regular movement, now incorporated doing stairs at work 50% of the time with co-workers during lunch breaks    Resources sent via [employer] on 10/24 regarding Sleep:    Overview: Sleep    [email or web address]     Sleep Diary   [email or web address]     Tips for Sleep Hygiene   [email or web address]     Do NOT worry about your sleep   [email or web address]     Your Calming Toolkit   [email or web address]      Making Healthy Changes for Sleep [email or web address]       NOTES:  Works full-time for [employer]  Health issues - blood pressure, cardiac  Experience with losing weight in the past - has lost 80lbs - physical health related   to wife with 1.5 year old and 4 year old (2 girls)  Family has had Covid 2x now (2nd time in July)  1 dog - most consistent exercise is walking the dog 1-1.5 mile e/o day - helpful  Making time to exercise can be a struggle - has added in stairs at work.      Follow-up appointments:    11/8 with SERA Rucker #5  11/28 with [facility] HC #6  12/5 with ARMEN Mckeon f/u  January 2023 medication check with Dr. Knox (will message earlier if needed)    Reminder:  Monthly Healthy Lifestyle Support Groups offered virtually via TEAMS.  Contact Maya Vieira ([email or web address]) to be added to the invite list.  Upcoming:  (Fridays, 12:30pm to 1:30pm):    2022 Meetings  October 28th: Guest Speaker: Alka Guerrier [facility], Health , \"Gratitude Practices\"  November 18: Guest Speaker: Honey Rios RD Registered Dietitian, \"Navigating How to Eat around the Holidays\"  December 16: Guest Speaker: Marlyn Hawkins [facility], \"Changing Your Relationship with Movement\"    2023 Meetings  January 20: \"Let's Talk\" a time for the group to share  February 17: Guest Speaker, Honey Blanco RD, Registered Dietician, \"Tips to Maximize Your " "Metabolism\"  : Let's Talk\" a time for the group to share  : Guest Speaker, Angela Saldana RD, Registered Dietician, \"Heart Health\"  May 19: \"Let's Talk\" a time for the group to share       SIGNATURE:  MARYCHUY Brooks, [facility]  National Board-Certified Health &   24-Week Healthy Lifestyle Plan Health   [facility]  email:  [email or web address]  appointment [phone]    "

## 2022-11-02 ENCOUNTER — TELEPHONE (OUTPATIENT)
Dept: SURGERY | Facility: CLINIC | Age: 37
End: 2022-11-02

## 2022-11-02 NOTE — TELEPHONE ENCOUNTER
Prior Authorization Retail Medication Request    Medication/Dose: Liraglutide Inject 0.6 mg Subcutaneous daily for 7 days, THEN 1.2 mg daily for 7 days, THEN 1.8 mg daily for 7 days, THEN 2.4 mg daily for 7 days, THEN 3 mg daily  Previously Tried and Failed:  Cannot use stimulants due to his history of heart failure  Rationale:  Prefer Wegovy, however, not available. Will RX Liraglutide which is excellent though daily injection VS weekly. Once up to 3.0mg then change to Semaglutide 1.7mg for 1 month then 2.4mg ongoing In addition to his blood sugars, weight, this may positively affect his hepatic steatosis and offer cardioprotection.  Weight down 15# since intro already.    Insurance Name:  [facility] ACIS Enrollments - [employer]  Insurance ID:  [id number]      Pharmacy Information (if different than what is on RX)  Name: [facility] [place]  Phone:  [phone]

## 2022-11-03 NOTE — TELEPHONE ENCOUNTER
Central Prior Authorization Team   Phone: [phone]    PA Initiation    Medication: Saxenda 18MG/3ML pen-injectors  Insurance Company: [employer] ([facility]) - Phone [phone] Fax [phone]  Pharmacy Filling the Rx: [id number] - [place], [place] - [address]  Filling Pharmacy Phone: [phone]  Filling Pharmacy Fax:    Start Date: 11/3/2022

## 2022-11-07 NOTE — TELEPHONE ENCOUNTER
PRIOR AUTHORIZATION DENIED    Medication: Saxenda 18MG/3ML pen-injectors    Denial Date: 11/7/2022    Denial Rational: Medication is excluded

## 2022-11-08 ENCOUNTER — VIRTUAL VISIT (OUTPATIENT)
Dept: SURGERY | Facility: CLINIC | Age: 37
End: 2022-11-08
Payer: COMMERCIAL

## 2022-11-08 DIAGNOSIS — E66.9 OBESITY: Primary | ICD-10-CM

## 2022-11-08 PROCEDURE — 99207 PR MWM HEALTH COACH NO CHARGE: CPT

## 2022-11-08 NOTE — PROGRESS NOTES
Reason for Visit: 24-Week Healthy Lifestyle Plan Follow up Visit - Health Coaching Virtual Visit    Progress Notes:  Return 24-Week Healthy Lifestyle Plan Weight Management Health Coaching Note - Virtual Visit  Alfred Rosario   MRN: [id number]  : 1985  STEVEN: 2022    Health  Follow-up Visit #: 5  Provider: Alka Guerrier [facility]  Location: off-site/home office    ASSESSMENT:  Initial Start Date:  2022  Graduation Date:  2022  Tim (online resource) enrollment date(s):  2022  Physician:  Dr. Knox  Referred by: PCP - Dr. Boswell  Initial Weight (lbs): 307 lbs.  Current Weight (lbs): 289 lbs (reported with Dr. Knox on 10/12) weighs every couple days slow and steady   Average Daily Water (ounces): - oz/day (drinks more water at work)  Weight Loss Medication: Saxenda was prescribed but denied by insurance.   Discount card still had this medication with out of pocket costs in the thousands.  Naltrexone - discontinued due to not feeling well on it (more mandel) and not feeling like it was very effective.  May try again at a future date.  October: Started Merformin per Dr. Knox (impacts hydration but feeling better now)  Nutrition Plan: real whole foods       Conversation today:  Nutrition: eating is going ok. Still meal planning, taking foods to work. Halloween fell in-between last health  visit and today. Been switching up options of breads. Example now buying whole grain bread.  Exercise/Movement/Activity: been consistent with doing the stairs at work during breaks 2-3 x/week.  Thinking of adding in the treadmill after work now that the weather is turning a bit colder.  Sleep: been going ok  Other: work has been good, working overtime recently. Self-care on days off. This week has 4 days       Previous Goal(s) review:  Sleep: read thru Sleep resources send by health Alka  Nutrition: continue to plan ahead, incorporate veggies and focus on protein first. Low  Total body pain dizziness lightheadedness carbs  Increase water intake at home, days off from work.  Exercise: continue with regular movement, now incorporated doing stairs at work 50% of the time with co-workers during lunch breaks      GOALS established today by client:  Exercise/Movement: walk/jog on treadmill on days off from work. During work days, doing stairs   Nutrition: continue with current plan  Other: has an Apple watch so may use it to begin tracking  Will message Dr. Knox if he wishes to pursue an alternate med OR may wait until new calendar year if he switches to wife's insurance which may cover the meds that have been denied to date.      Resources Provided:   Resources sent via [employer] on 10/24 regarding Sleep:     Overview: Sleep     [email or web address]      Sleep Diary    [email or web address]      Tips for Sleep Hygiene    [email or web address]      Do NOT worry about your sleep    [email or web address]      Your Calming Toolkit    [email or web address]       Making Healthy Changes for Sleep [email or web address]           NOTES:  Works full-time for [employer]  Health issues - blood pressure, cardiac  Experience with losing weight in the past - has lost 80lbs - physical health related   to wife with 1.5 year old and 4 year old (2 girls)  Family has had Covid 2x now (2nd time in July)  1 dog - most consistent exercise is walking the dog 1-1.5 mile e/o day - helpful  Making time to exercise can be a struggle - has added in stairs at work.      Follow-up appointments:    12/5 with ARMEN Mckeon f/u  January 2023 medication check with Dr. Knox (will message earlier if needed)      Reminder:  Monthly Healthy Lifestyle Support Groups offered virtually via TEAMS.  Contact Maya Vieira ([email or web address]) to be added to the invite list.  Upcoming:  (Fridays, 12:30pm to 1:30pm):    2022 Meetings  November 18: Guest Speaker: Honey Rios RD Registered Dietitian,  "\"Navigating How to Eat around the Holidays\"  : Guest Speaker: Marlyn Hawkins, [facility], \"Changing Your Relationship with Movement\"     Meetings  : \"Let's Talk\" a time for the group to share  : Guest Speaker, Honey Blanco RD, Registered Dietician, \"Tips to Maximize Your Metabolism\"  : Let's Talk\" a time for the group to share  : Guest Speaker, Angela Saldana RD, Registered Dietician, \"Heart Health\"  May 19: \"Let's Talk\" a time for the group to share       SIGNATURE:  MARYCHUY Brooks, [facility]  National Board-Certified Health &   24-Week Healthy Lifestyle Plan Health   [place] [facility]  email:  [email or web address]  appointment [phone]  "

## 2022-11-08 NOTE — LETTER
2022         RE: Alfred Rosario  [address] [place] [address]        Dear Colleague,    Thank you for referring your patient, Alfred Rosario, to the [facility] [place]. Please see a copy of my visit note below.    Reason for Visit: 24-Week Healthy Lifestyle Plan Follow up Visit - Health Coaching Virtual Visit    Progress Notes:  Return 24-Week Healthy Lifestyle Plan Weight Management Health Coaching Note - Virtual Visit  Alfred Rosario   MRN: [id number]  : 1985  STEVEN: 2022    Health  Follow-up Visit #: 5  Provider: Alka Guerrier [facility]  Location: off-site/home office    ASSESSMENT:  Initial Start Date:  2022  Graduation Date:  2022  Tim (online resource) enrollment date(s):  2022  Physician:  Dr. Knox  Referred by: PCP - Dr. Boswell  Initial Weight (lbs): 307 lbs.  Current Weight (lbs): 289 lbs (reported with Dr. Knox on 10/12) weighs every couple days slow and steady   Average Daily Water (ounces): - oz/day (drinks more water at work)  Weight Loss Medication: Saxenda was prescribed but denied by insurance.   Discount card still had this medication with out of pocket costs in the thousands.  Naltrexone - discontinued due to not feeling well on it (more mandel) and not feeling like it was very effective.  May try again at a future date.  October: Started Merformin per Dr. Knox (impacts hydration but feeling better now)  Nutrition Plan: real whole foods       Conversation today:  Nutrition: eating is going ok. Still meal planning, taking foods to work. Halloween fell in-between last health  visit and today. Been switching up options of breads. Example now buying whole grain bread.  Exercise/Movement/Activity: been consistent with doing the stairs at work during breaks 2-3 x/week.  Thinking of adding in the treadmill after work now that the weather is turning a bit colder.  Sleep: been  going ok  Other: work has been good, working overtime recently. Self-care on days off. This week has 4 days       Previous Goal(s) review:  Sleep: read thru Sleep resources send by health , Alka  Nutrition: continue to plan ahead, incorporate veggies and focus on protein first. Low carbs  Increase water intake at home, days off from work.  Exercise: continue with regular movement, now incorporated doing stairs at work 50% of the time with co-workers during lunch breaks      GOALS established today by client:  Exercise/Movement: walk/jog on treadmill on days off from work. During work days, doing stairs   Nutrition: continue with current plan  Other: has an Apple watch so may use it to begin tracking  Will message Dr. Knox if he wishes to pursue an alternate med OR may wait until new calendar year if he switches to wife's insurance which may cover the meds that have been denied to date.      Resources Provided:   Resources sent via [employer] on 10/24 regarding Sleep:     Overview: Sleep     [email or web address]      Sleep Diary    [email or web address]      Tips for Sleep Hygiene    [email or web address]      Do NOT worry about your sleep    [email or web address]      Your Calming Toolkit    [email or web address]       Making Healthy Changes for Sleep [email or web address]           NOTES:  Works full-time for [employer]  Health issues - blood pressure, cardiac  Experience with losing weight in the past - has lost 80lbs - physical health related   to wife with 1.5 year old and 4 year old (2 girls)  Family has had Covid 2x now (2nd time in July)  1 dog - most consistent exercise is walking the dog 1-1.5 mile e/o day - helpful  Making time to exercise can be a struggle - has added in stairs at work.      Follow-up appointments:    12/5 with ARMEN Mckeon f/u  January 2023 medication check with Dr. Knox (will message earlier if  "needed)      Reminder:  Monthly Healthy Lifestyle Support Groups offered virtually via TEAMS.  Contact Maya Vieira ([email or web address]) to be added to the invite list.  Upcoming:  (, 12:30pm to 1:30pm):     Meetings  : Guest Speaker: Honey Rios RD Registered Dietitian, \"Navigating How to Eat around the Holidays\"  : Guest Speaker: Marlyn Hawkins, [facility], \"Changing Your Relationship with Movement\"    2023: \"Let's Talk\" a time for the group to share  : Guest Speaker, Honey Blanco RD, Registered Dietician, \"Tips to Maximize Your Metabolism\"  : Let's Talk\" a time for the group to share  : Guest Speaker, Angela Saldana RD, Registered Dietician, \"Heart Health\"  May 19: \"Let's Talk\" a time for the group to share       SIGNATURE:  MARYCHUY Brooks, [facility]  National Board-Certified Health &   24-Week Healthy Lifestyle Plan Health   [place] [facility]  email:  [email or web address]  appointment [phone]      Again, thank you for allowing me to participate in the care of your patient.        Sincerely,        Alka Guerrier    "

## 2022-11-29 ENCOUNTER — VIRTUAL VISIT (OUTPATIENT)
Dept: SURGERY | Facility: CLINIC | Age: 37
End: 2022-11-29
Payer: COMMERCIAL

## 2022-11-29 DIAGNOSIS — I42.0 DILATED CARDIOMYOPATHY (H): ICD-10-CM

## 2022-11-29 DIAGNOSIS — E66.9 OBESITY: Primary | ICD-10-CM

## 2022-11-29 DIAGNOSIS — R63.8 ABNORMAL CRAVING: Primary | ICD-10-CM

## 2022-11-29 DIAGNOSIS — E88.810 METABOLIC SYNDROME: ICD-10-CM

## 2022-11-29 DIAGNOSIS — K76.0 HEPATIC STEATOSIS: ICD-10-CM

## 2022-11-29 DIAGNOSIS — E66.01 MORBID OBESITY WITH BMI OF 40.0-44.9, ADULT (H): ICD-10-CM

## 2022-11-29 PROCEDURE — 99207 PR MWM HEALTH COACH NO CHARGE: CPT

## 2022-11-29 RX ORDER — BUPROPION HYDROCHLORIDE 150 MG/1
150 TABLET ORAL EVERY MORNING
Qty: 90 TABLET | Refills: 3 | Status: SHIPPED | OUTPATIENT
Start: 2022-11-29

## 2022-11-29 RX ORDER — NALTREXONE HYDROCHLORIDE 50 MG/1
TABLET, FILM COATED ORAL
Qty: 45 TABLET | Refills: 3 | Status: SHIPPED | OUTPATIENT
Start: 2022-11-29

## 2022-11-29 RX ORDER — SEMAGLUTIDE 0.25 MG/.5ML
0.25 INJECTION, SOLUTION SUBCUTANEOUS WEEKLY
Qty: 2 ML | Refills: 0 | Status: SHIPPED | OUTPATIENT
Start: 2022-11-29 | End: 2022-12-27

## 2022-11-29 NOTE — LETTER
"    2022         RE: Alfred Rosario  [address] [place] [address]        Dear Colleague,    Thank you for referring your patient, Alfred Rosario, to the [facility] [place]. Please see a copy of my visit note below.    Reason for Visit: 24-Week Healthy Lifestyle Plan Follow up Visit - Health Coaching Virtual Visit    Progress Notes:  Return 24-Week Healthy Lifestyle Plan Weight Management Health Coaching Note - Virtual Visit  Alfred Rosario   MRN: [id number]  : 1985  STEVEN: 2022    Health  Follow-up Visit #: 6  Provider: Alka Guerrier [facility]  Location: off-site/home office    Initial Start Date:  2022  Graduation Date:  2022  Tim (online resource) enrollment date(s):  2022  Physician:  Dr. Knox  Referred by: PCP - Dr. Boswell  Initial Weight (lbs): 307 lbs.  Current Weight (lbs): did not report today. Previously was 289 lbs (reported with Dr. Knox on 10/12) weighs every couple days slow and steady   Average Daily Water (ounces): - oz/day (drinks more water at work)  Weight Loss Medication: Saxenda was prescribed but denied by insurance.   Discount card still had this medication with out of pocket costs in the thousands.  Naltrexone - discontinued due to not feeling well on it (more mandel) and not feeling like it was very effective.  May try again at a future date.  October: Started Merformin per Dr. Knox (impacts hydration but feeling better now)  Nutrition Plan: real whole foods      Conversation today:  Nutrition: been going well. Around the holiday, felt easier to \"control\" things this year. Working on portion control.  Exercise/Movement/Activity: treadmill at home  Sleep: taking an extra day off from work and this helps to increase amount of sleep. 7-8 hours of uninterrupted rest.  Stress Management: a little more stressed with holiday schedule last week and kids   Talking with a therapist " "regarding stress and how it impacts him and his decisions.      Previous Goal(s) review:  Exercise/Movement: walk/jog on treadmill on days off from work. During work days, doing stairs   Nutrition: continue with current plan  Other: has an Apple watch so may use it to begin tracking  Will message Dr. Knox if he wishes to pursue an alternate med OR may wait until new calendar year if he switches to wife's insurance which may cover the meds that have been denied to date.      GOALS established today by client:  Exercise/Movement: main focus here over the next couple weeks:  to continue doing something (walking, stairs) etc at least 20 min.  Other: Alka to send Dr. Knox a message today regarding replying to questions re: medication      Resources Provided:  provided sleep resources at last health coaching visit.      NOTES:  Works full-time for [employer]  Health issues - blood pressure, cardiac  Experience with losing weight in the past - has lost 80lbs - physical health related   to wife with 1.5 year old and 4 year old (2 girls)  Family has had Covid 2x now (2nd time in July)  1 dog - most consistent exercise is walking the dog 1-1.5 mile e/o day - helpful  Making time to exercise can be a struggle - has added in stairs at work.      Follow-up appointments:    12/5 with ARMEN Mckeon f/u  12/19 with Alka,  #7  1/13 with Dr. Knox (med check & f/u)      Reminder:  Monthly Healthy Lifestyle Support Groups offered virtually via TEAMS.  Contact Maya Dariel ([email or web address]) to be added to the invite list.  Upcoming:  (Fridays, 12:30pm to 1:30pm):    2022 Meetings  December 16: Guest Speaker: Marlyn Hawkins [facility], \"Changing Your Relationship with Movement\"    2023 Meetings January 20: \"Let's Talk\" a time for the group to share  February 17: Guest Speaker, Honey Blanco RD, Registered Dietician, \"Tips to Maximize Your Metabolism\"  March 17: Let's Talk\" a time for the group to share  April 14: Guest Speaker, " "Angela Saldana RD, Registered Dietician, \"Heart Health\"  May 19: \"Let's Talk\" a time for the group to share       SIGNATURE:  MARYCHUY Brooks, [facility]  National Board-Certified Health &   24-Week Healthy Lifestyle Plan Health   [facility] Weight Management Program  email:  [email or web address]  appointment [phone]      Again, thank you for allowing me to participate in the care of your patient.        Sincerely,        Alka Guerrier    "

## 2022-11-29 NOTE — PROGRESS NOTES
"Reason for Visit: 24-Week Healthy Lifestyle Plan Follow up Visit - Health Coaching Virtual Visit    Progress Notes:  Return 24-Week Healthy Lifestyle Plan Weight Management Health Coaching Note - Virtual Visit  Alfred Rosario   MRN: [id number]  : 1985  STEVEN: 2022    Health  Follow-up Visit #: 6  [facility]  Location: off-site/home office    Initial Start Date:  2022  Graduation Date:  2022  Tim (online resource) enrollment date(s):  2022  Physician:  Dr. Knox  Referred by: PCP - Dr. Boswell  Initial Weight (lbs): 307 lbs.  Current Weight (lbs): did not report today. Previously was 289 lbs (reported with Dr. Knox on 10/12) weighs every couple days slow and steady   Average Daily Water (ounces): - oz/day (drinks more water at work)  Weight Loss Medication: Saxenda was prescribed but denied by insurance.   Discount card still had this medication with out of pocket costs in the thousands.  Naltrexone - discontinued due to not feeling well on it (more mandel) and not feeling like it was very effective.  May try again at a future date.  October: Started Merformin per Dr. Knox (impacts hydration but feeling better now)  Nutrition Plan: real whole foods      Conversation today:  Nutrition: been going well. Around the holiday, felt easier to \"control\" things this year. Working on portion control.  Exercise/Movement/Activity: treadmill at home  Sleep: taking an extra day off from work and this helps to increase amount of sleep. 7-8 hours of uninterrupted rest.  Stress Management: a little more stressed with holiday schedule last week and kids   Talking with a therapist regarding stress and how it impacts him and his decisions.      Previous Goal(s) review:  Exercise/Movement: walk/jog on treadmill on days off from work. During work days, doing stairs   Nutrition: continue with current plan  Other: has an Apple watch so may use it to begin tracking  Will " "message Dr. Knox if he wishes to pursue an alternate med OR may wait until new calendar year if he switches to wife's insurance which may cover the meds that have been denied to date.      GOALS established today by client:  Exercise/Movement: main focus here over the next couple weeks:  to continue doing something (walking, stairs) etc at least 20 min.  Other: Alka to send Dr. Knox a message today regarding replying to questions re: medication      Resources Provided:  provided sleep resources at last health coaching visit.      NOTES:  Works full-time for [employer]  Health issues - blood pressure, cardiac  Experience with losing weight in the past - has lost 80lbs - physical health related   to wife with 1.5 year old and 4 year old (2 girls)  Family has had Covid 2x now (2nd time in July)  1 dog - most consistent exercise is walking the dog 1-1.5 mile e/o day - helpful  Making time to exercise can be a struggle - has added in stairs at work.      Follow-up appointments:    12/5 with ARMEN Mckeon f/u  12/19 with Alka,  #7  1/13 with Dr. Knox (med check & f/u)      Reminder:  Monthly Healthy Lifestyle Support Groups offered virtually via TEAMS.  Contact Maya Nelsonine ([email or web address]) to be added to the invite list.  Upcoming:  (Fridays, 12:30pm to 1:30pm):    2022 Meetings  December 16: Guest Speaker: Marlyn Hawkins, [facility], \"Changing Your Relationship with Movement\"    2023 Meetings  January 20: \"Let's Talk\" a time for the group to share  February 17: Guest Speaker, Honey Blanco RD, Registered Dietician, \"Tips to Maximize Your Metabolism\"  March 17: Let's Talk\" a time for the group to share  April 14: Guest Speaker, Angela Saldana RD, Registered Dietician, \"Heart Health\"  May 19: \"Let's Talk\" a time for the group to share       SIGNATURE:  MARYCHUY Brooks, [facility]  National Board-Certified Health &   24-Week Healthy Lifestyle Plan Health   [facility] Weight " Management Program  email:  [email or web address]  appointment [phone]

## 2022-12-05 ENCOUNTER — TRANSFERRED RECORDS (OUTPATIENT)
Dept: HEALTH INFORMATION MANAGEMENT | Facility: CLINIC | Age: 37
End: 2022-12-05

## 2022-12-05 ENCOUNTER — VIRTUAL VISIT (OUTPATIENT)
Dept: SURGERY | Facility: CLINIC | Age: 37
End: 2022-12-05
Payer: COMMERCIAL

## 2022-12-05 DIAGNOSIS — E66.9 OBESITY (BMI 30-39.9): Primary | ICD-10-CM

## 2022-12-05 PROCEDURE — 97803 MED NUTRITION INDIV SUBSEQ: CPT | Mod: GT | Performed by: DIETITIAN, REGISTERED

## 2022-12-05 NOTE — LETTER
12/5/2022         RE: Alfred Rosario  [address] [place] [address]        Dear Colleague,    Thank you for referring your patient, Alfred Rosario, to the [facility] [place]. Please see a copy of my visit note below.    Alfred Rosario is a 37 year old who is being evaluated via a billable video visit.     Medical  Weight Loss Follow-Up Diet Evaluation - 24 Week Program  Assessment:  Alfred is presenting today for a follow up weight management nutrition consultation. Pt has had an initial appointment with Dr. Knox  RD visit #4  Weight loss medication: Naltrexone, Wegovy, Wellbutrin  Pt's weight is 289 lb  Initial weight: 307 lb  Weight change: down 18 lbs    No flowsheet data found.  BMI: There is no height or weight on file to calculate BMI.  Patient weight not recorded    Estimated RMR (Milton-St Jeor equation):  2353 kcals x 1.2 (sedentary) = 2824 kcals (for weight maintenance)  2353 kcals x 1.3 (light active) = 3236 kcals (for weight maintenance)  Recommended Protein Intake: 120-140 grams of protein/day  Patient Active Problem List:  Patient Active Problem List   Diagnosis     Hypertension, uncontrolled     Dilated cardiomyopathy (H)     LEE ANN (obstructive sleep apnea)     Morbid obesity with BMI of 40.0-44.9, adult (H)     Paroxysmal A-fib (H)     Benign essential hypertension     Dyslipidemia     Metabolic syndrome     Pre-diabetes     Hepatic steatosis     Diabetes: No    Progress on goals from last visit: Has been doing meal prepping for lunch consistently and has found this beneficial to improving his nutrition intake. But still finds himself eating foods that don't support his goals. He is still struggling with eating a breakfast at 4-5 am and still tends to feel like he needs a snack at 2-3 pm. He plans to focus more on planning and prepping foods ahead of time to have meals and snacks ready to eat on busy days. He is also  "considering meal delivery programs (e.g. Hello Fresh, Home ) to help with meal planning.  1. Have a protein source at each meal - met  2. Meal prep for 3 meals per day, work on being more consistent with breakfast - not met, not eating breakfast consistently  3. Limit sweets intake - going well overall, Thanksgiving week has been tough overall  4. Switch out milk for la croix at dinner time - met    Dietary Recall:  Wakes up at 3 am, has been skippin breakfast, wants to try to have something from 4-5 am   Protein shake (premiere) at 7 am  Lunch: 9-10 am  Has been in the routine of taking lunch that he meal prepped - meat, carrots and onions  Small can diet coke sometimes in afternoon  When he gets home (2 pm) will have a snack, cheese   Dinner: 5 pm - eats what the kids eat which \"isn't always the best\"  Snack: craves sweets before bed - in his mind he views it as a reward for a long day, he would like to get away from this  Goes to bed at 8 pm  Beverages:   Water  [place]  Nutrition Diagnosis:    Overweight/Obesity (NC 3.3) related to excessive calorie intake as evidenced by BMI 39.2    Intervention:  1. Food and/or nutrient delivery: High protein, meal planning for 3 meals per day  2. Nutrition education: Discussed meal timing, benefits to eating breakfast  3. Nutrition counseling: Reviewed progress with goals, goal setting    Monitoring/Evaluation:    Goals:  5. Have a protein source at each meal  6. Meal prep for 3 meals per day, work on being more consistent with breakfast (main focus). Consider trying meals delivery programs.  7. Limit sweets intake    Pt will follow up with health  and 3 month(s) with dietitian.     Video-Visit Details    Type of service:  Video Visit    Video Start Time (time video started): 3:07 pm    Video End Time (time video stopped): 3:28 pm    Originating Location (pt. Location): Home        Distant Location (provider location):  On-site    Mode of Communication:  Video " Conference via [facility]    Physician has received verbal consent for a Video Visit from the patient? Yes      Linda Terrell          Again, thank you for allowing me to participate in the care of your patient.        Sincerely,        Linda Terrell

## 2022-12-05 NOTE — PROGRESS NOTES
Alfred Rosario is a 37 year old who is being evaluated via a billable video visit.     Medical  Weight Loss Follow-Up Diet Evaluation - 24 Week Program  Assessment:  Alfred is presenting today for a follow up weight management nutrition consultation. Pt has had an initial appointment with Dr. Knox  RD visit #4  Weight loss medication: Naltrexone, Wegovy, Wellbutrin  Pt's weight is 289 lb  Initial weight: 307 lb  Weight change: down 18 lbs    No flowsheet data found.  BMI: There is no height or weight on file to calculate BMI.  Patient weight not recorded    Estimated RMR ([place]-St Jeor equation):  2353 kcals x 1.2 (sedentary) = 2824 kcals (for weight maintenance)  2353 kcals x 1.3 (light active) = 3236 kcals (for weight maintenance)  Recommended Protein Intake: 120-140 grams of protein/day  Patient Active Problem List:  Patient Active Problem List   Diagnosis     Hypertension, uncontrolled     Dilated cardiomyopathy (H)     LEE ANN (obstructive sleep apnea)     Morbid obesity with BMI of 40.0-44.9, adult (H)     Paroxysmal A-fib (H)     Benign essential hypertension     Dyslipidemia     Metabolic syndrome     Pre-diabetes     Hepatic steatosis     Diabetes: No    Progress on goals from last visit: Has been doing meal prepping for lunch consistently and has found this beneficial to improving his nutrition intake. But still finds himself eating foods that don't support his goals. He is still struggling with eating a breakfast at 4-5 am and still tends to feel like he needs a snack at 2-3 pm. He plans to focus more on planning and prepping foods ahead of time to have meals and snacks ready to eat on busy days. He is also considering meal delivery programs (e.g. Hello Fresh, Home ) to help with meal planning.  1. Have a protein source at each meal - met  2. Meal prep for 3 meals per day, work on being more consistent with breakfast - not met, not eating breakfast consistently  3. Limit sweets intake - going  "well overall, Thanksgiving week has been tough overall  4. Switch out milk for la croix at dinner time - met    Dietary Recall:  Wakes up at 3 am, has been skippin breakfast, wants to try to have something from 4-5 am   Protein shake (premiere) at 7 am  Lunch: 9-10 am  Has been in the routine of taking lunch that he meal prepped - meat, carrots and onions  Small can diet coke sometimes in afternoon  When he gets home (2 pm) will have a snack, cheese   Dinner: 5 pm - eats what the kids eat which \"isn't always the best\"  Snack: craves sweets before bed - in his mind he views it as a reward for a long day, he would like to get away from this  Goes to bed at 8 pm  Beverages:   Water  [place]  Nutrition Diagnosis:    Overweight/Obesity (NC 3.3) related to excessive calorie intake as evidenced by BMI 39.2    Intervention:  1. Food and/or nutrient delivery: High protein, meal planning for 3 meals per day  2. Nutrition education: Discussed meal timing, benefits to eating breakfast  3. Nutrition counseling: Reviewed progress with goals, goal setting    Monitoring/Evaluation:    Goals:  5. Have a protein source at each meal  6. Meal prep for 3 meals per day, work on being more consistent with breakfast (main focus). Consider trying meals delivery programs.  7. Limit sweets intake    Pt will follow up with health  and 3 month(s) with dietitian.     Video-Visit Details    Type of service:  Video Visit    Video Start Time (time video started): 3:07 pm    Video End Time (time video stopped): 3:28 pm    Originating Location (pt. Location): Home        Distant Location (provider location):  On-site    Mode of Communication:  Video Conference via [facility]    Physician has received verbal consent for a Video Visit from the patient? Yes      Linda Terrell      "

## 2022-12-19 ENCOUNTER — TELEPHONE (OUTPATIENT)
Dept: SURGERY | Facility: CLINIC | Age: 37
End: 2022-12-19

## 2022-12-19 DIAGNOSIS — I50.21 ACUTE SYSTOLIC CONGESTIVE HEART FAILURE (H): ICD-10-CM

## 2022-12-19 NOTE — TELEPHONE ENCOUNTER
Call attempt x2 for health and wellness coaching appointment as part of the 24-week Healthy Lifestyle Plan.    Sent email and Amp'd Mobilehart message to offer assistance in getting the appt rescheduled.    May call the main scheduling line for assistance as well:  [phone].     SIGNATURE:  MARYCHUY Brooks, [facility]  National Board-Certified Health &   24-Week Healthy Lifestyle Plan Health   [facility] Weight Management Program  email:  [email or web address]  appointment [phone]

## 2022-12-20 RX ORDER — FUROSEMIDE 20 MG
20 TABLET ORAL DAILY PRN
Qty: 90 TABLET | Refills: 1 | Status: SHIPPED | OUTPATIENT
Start: 2022-12-20 | End: 2023-06-27

## 2022-12-27 ENCOUNTER — TELEPHONE (OUTPATIENT)
Dept: FAMILY MEDICINE | Facility: CLINIC | Age: 37
End: 2022-12-27

## 2022-12-27 NOTE — TELEPHONE ENCOUNTER
Call attempt x2 for health and wellness coaching appointment #7 as part of the 24-week Healthy Lifestyle Plan.    Sent email and YouFastUnlockhart message to offer assistance in getting the appt rescheduled.    May call the main scheduling line for assistance as well:  [phone].     SIGNATURE:  MARYCHUY Brooks, [facility]  National Board-Certified Health &   24-Week Healthy Lifestyle Plan Health   [facility] Weight Management Program  email:  [email or web address]  appointment [phone]

## 2023-01-04 ENCOUNTER — VIRTUAL VISIT (OUTPATIENT)
Dept: FAMILY MEDICINE | Facility: CLINIC | Age: 38
End: 2023-01-04
Payer: COMMERCIAL

## 2023-01-04 DIAGNOSIS — E66.9 OBESITY: Primary | ICD-10-CM

## 2023-01-04 PROCEDURE — 99207 PR MWM HEALTH COACH NO CHARGE: CPT

## 2023-01-04 NOTE — PROGRESS NOTES
Reason for Visit: 24-Week Healthy Lifestyle Plan Follow up Visit - Health Coaching Virtual Visit    Progress Notes:  Return 24-Week Healthy Lifestyle Plan Weight Management Health Coaching Note - Virtual Visit  Alfred Rosario   MRN: [id number]  : 1985  STEVEN: 2023    Health  Follow-up Visit #: 7  Provider: Alka Guerrier [facility]  Location: off-site/home office    Initial Start Date:  2022  Graduation Date:  2022  Tim (online resource) enrollment date(s):  2022  Physician:  Dr. Knox  Referred by: PCP - Dr. Boswell  Initial Weight (lbs): 307 lbs.  Current Weight (lbs): 286 lbs. (7% loss)  Average Daily Water (ounces): - oz/day (drinks more water at work)  Weight Loss Medication: Saxenda was prescribed but denied by insurance.   Discount card still had this medication with out of pocket costs in the thousands.  Naltrexone - discontinued due to not feeling well on it (more mandel) and not feeling like it was very effective.  May try again at a future date.  October: Started Merformin per Dr. Knox (impacts hydration but feeling better now)  Nutrition Plan: real whole foods      Conversation today:  Nutrition: would like to snack less so planning on sitting down to eat meals  Exercise/Movement/Activity: been helping outside at work ([employer]) so getting 10,000 steps/day pretty easy  Sleep: improved sleep with weight loss. Sleeping better and less waking up thru the night.      Previous Goal(s) review:  GOALS established today by client:  Exercise/Movement: main focus here over the next couple weeks:  to continue doing something (walking, stairs) etc at least 20 min. - def been getting steps in at work as spending time outside to help as short staffed. Unsure of his exact baseline of average steps/day but some days are exceeding 10,000/day  Other: Alka to send Dr. Knox a message today regarding replying to questions re: medication - Metformin regularly. No side effects.  "Injectable is expensive so most likely not going to fill a prescription for that.      GOALS established today by client:  Exercise/Movement: average of steps/day. What is baseline? Continue to be mindful of getting 7000-07773 steps/day  Nutrition: sit down to eat at the table (together as family and less snacking)      NOTES:  Works full-time for [employer]  Health issues - blood pressure, cardiac  Experience with losing weight in the past - has lost 80lbs - physical health related   to wife with 1.5 year old and 4 year old (2 girls)  Family has had Covid 2x now (2nd time in July)  1 dog - most consistent exercise is walking the dog 1-1.5 mile e/o day - helpful  Making time to exercise can be a struggle - has added in stairs at work.      Follow-up appointments:     with Dr. Knox (med check and f/u)   with SERA Rucker #8 f/u  3/6 with ARMEN العلي f/u      Reminder:  Monthly Healthy Lifestyle Support Groups offered virtually via TEAMS.  Contact Maya Dariel ([email or web address]) to be added to the invite list.  Upcoming:  (, 12:30pm to 1:30pm):     Meetings  : \"Let's Talk\" a time for the group to share  : Guest Speaker, Honey Blanco RD, Registered Dietician, \"Tips to Maximize Your Metabolism\"  : Let's Talk\" a time for the group to share  : Guest Speaker, Angela Saldana RD, Registered Dietician, \"Heart Health\"  May 19: \"Let's Talk\" a time for the group to share   + dates coming soon...       SIGNATURE:  MARYCHUY Brooks, [facility]  National Board-Certified Health &   24-Week Healthy Lifestyle Plan Health   [place] [facility]  email:  [email or web address]  appointment [phone]  "

## 2023-01-23 ENCOUNTER — VIRTUAL VISIT (OUTPATIENT)
Dept: SURGERY | Facility: CLINIC | Age: 38
End: 2023-01-23
Payer: COMMERCIAL

## 2023-01-23 DIAGNOSIS — E66.9 OBESITY: Primary | ICD-10-CM

## 2023-01-23 PROCEDURE — 99207 PR MWM HEALTH COACH NO CHARGE: CPT

## 2023-01-23 NOTE — LETTER
2023         RE: Alfred Rosario  [address] [place] [address]        Dear Colleague,    Thank you for referring your patient, Alfred Rosario, to the [facility]ATRICS [facility]ARE [place]. Please see a copy of my visit note below.    Reason for Visit: 24-Week Healthy Lifestyle Plan Follow up Visit - Health Coaching Virtual Visit    Progress Notes:  Return 24-Week Healthy Lifestyle Plan Weight Management Health Coaching Note - Virtual Visit  Alfred Rosario   MRN: [id number]  : 1985  STEVEN: 2023    Health  Follow-up Visit #: 8  Provider: Alka Guerrire [facility]  Location: off-site/home office    Initial Start Date:  2022  Graduation Date:  2022  Tim (online resource) enrollment date(s):  2022  Physician:  Dr. Knox  Referred by: PCP - Dr. Boswell  Initial Weight (lbs): 307 lbs.  Current Weight (lbs): did not report today, was previously 286 lbs. (7% loss) on 23  Average Daily Water (ounces): - oz/day (drinks more water at work)  Weight Loss Medication: Saxenda was prescribed but denied by insurance.   Discount card still had this medication with out of pocket costs in the thousands.  Naltrexone - discontinued due to not feeling well on it (more mandel) and not feeling like it was very effective.  May try again at a future date.  October: Started Merformin per Dr. Knox (impacts hydration but feeling better now)  Nutrition Plan: real whole foods    Conversation today 23:  Nutrition: been sitting down for meals together as a family. Making food and then all together (wife works later than dinner time often).  Exercise/Movement/Activity: regularly increased in steps by getting outside regularly. Feels his moods is improved overall. Heartrate has been really good. Noticed a calmer.  Sleep: going well, done eating by 5/5:30 pm  Stress Management: spending time outside at work getting [employer] ready (short staffed so  "helping out in this capacity) has it's health benefits. More steps and feeling increase in calm. Improved mood.        Previous Goal(s) review from 1/4/23:  Exercise/Movement: average of steps/day. What is baseline? Continue to be mindful of getting 7000-41037 steps/day  Nutrition: sit down to eat at the table (together as family and less snacking)      GOALS established today 1/23 by client:  Exercise/Movement: ramp up consistency with getting on treadmill a bit more often.   Nutrition: continue to eat dinner with his kids (wife tends to work longer and thru dinner time some days)    Resources:  Sending names of 2 apps to use for tracking: Lost-It and My Fitness Pal    NOTES:  Works full-time for [employer]  Health issues - blood pressure, cardiac  Experience with losing weight in the past - has lost 80lbs - physical health related   to wife with 1.5 year old and 4 year old (2 girls)  Family has had Covid 2x now (2nd time in July)  1 dog - most consistent exercise is walking the dog 1-1.5 mile e/o day - helpful  Making time to exercise can be a struggle - has added in stairs at work.      Follow-up appointments:  2/16 with SERA Rucker #9  3/6 with ARMEN العلي f/u      Reminder:  Monthly Healthy Lifestyle Support Groups offered virtually via TEAMS.  Contact Maya Vieira ([email or web address]) to be added to the invite list.  Upcoming:  (Fridays, 12:30pm to 1:30pm):    2023 Meetings  January 20: \"Let's Talk\" a time for the group to share  February 17: Guest Speaker, Honey Blanco RD, Registered Dietician, \"Tips to Maximize Your Metabolism\"  March 17: Let's Talk\" a time for the group to share  April 14: Guest Speaker, Angela Saldana RD, Registered Dietician, \"Heart Health\"  May 19: \"Let's Talk\" a time for the group to share  June + dates coming soon...       SIGNATURE:  MARYCHUY Brooks, [facility]  National Board-Certified Health &   24-Week Healthy Lifestyle Plan Health   [place] [facility] Comprehensive Weight Management Program  email:  [email or web address]  appointment [phone]        Again, thank you for allowing me to participate in the care of your patient.        Sincerely,        Alka Guerrier

## 2023-01-23 NOTE — PROGRESS NOTES
Reason for Visit: 24-Week Healthy Lifestyle Plan Follow up Visit - Health Coaching Virtual Visit    Progress Notes:  Return 24-Week Healthy Lifestyle Plan Weight Management Health Coaching Note - Virtual Visit  Alfred Rosario   MRN: [id number]  : 1985  STEVEN: 2023    Health  Follow-up Visit #: 8  Provider: [facility]  Location: off-site/home office    Initial Start Date:  2022  Graduation Date:  2022  Tim (online resource) enrollment date(s):  2022  Physician:  Dr. Knox  Referred by: PCP - Dr. Boswell  Initial Weight (lbs): 307 lbs.  Current Weight (lbs): did not report today, was previously 286 lbs. (7% loss) on 23  Average Daily Water (ounces): - oz/day (drinks more water at work)  Weight Loss Medication: Saxenda was prescribed but denied by insurance.   Discount card still had this medication with out of pocket costs in the thousands.  Naltrexone - discontinued due to not feeling well on it (more mandel) and not feeling like it was very effective.  May try again at a future date.  October: Started Merformin per Dr. Knox (impacts hydration but feeling better now)  Nutrition Plan: real whole foods    Conversation today 23:  Nutrition: been sitting down for meals together as a family. Making food and then all together (wife works later than dinner time often).  Exercise/Movement/Activity: regularly increased in steps by getting outside regularly. Feels his moods is improved overall. Heartrate has been really good. Noticed a calmer.  Sleep: going well, done eating by 55:30 pm  Stress Management: spending time outside at work getting [employer] ready (short staffed so helping out in this capacity) has it's health benefits. More steps and feeling increase in calm. Improved mood.        Previous Goal(s) review from 23:  Exercise/Movement: average of steps/day. What is baseline? Continue to be mindful of getting 7000-48016 steps/day  Nutrition: sit  "down to eat at the table (together as family and less snacking)      GOALS established today  by client:  Exercise/Movement: ramp up consistency with getting on treadmill a bit more often.   Nutrition: continue to eat dinner with his kids (wife tends to work longer and thru dinner time some days)    Resources:  Sending names of 2 apps to use for tracking: Lost-It and My Fitness Pal    NOTES:  Works full-time for [employer]  Health issues - blood pressure, cardiac  Experience with losing weight in the past - has lost 80lbs - physical health related   to wife with 1.5 year old and 4 year old (2 girls)  Family has had Covid 2x now (2nd time in July)  1 dog - most consistent exercise is walking the dog 1-1.5 mile e/o day - helpful  Making time to exercise can be a struggle - has added in stairs at work.      Follow-up appointments:   with SERA Rucker #9  3/6 with ARMEN العلي f/u      Reminder:  Monthly Healthy Lifestyle Support Groups offered virtually via TEAMS.  Contact Maya Vieira ([email or web address]) to be added to the invite list.  Upcoming:  (, 12:30pm to 1:30pm):     Meetings  : \"Let's Talk\" a time for the group to share  : Guest Speaker, Honey Blanco RD, Registered Dietician, \"Tips to Maximize Your Metabolism\"  : Let's Talk\" a time for the group to share  : Guest Speaker, Angela Saldana RD, Registered Dietician, \"Heart Health\"  May 19: \"Let's Talk\" a time for the group to share   dates coming soon...       SIGNATURE:  MARYCHUY Brooks, [facility]  National Board-Certified Health &   24-Week Healthy Lifestyle Plan Health   [place] [facility]  email:  [email or web address]  appointment [phone]    "

## 2023-02-16 ENCOUNTER — TELEPHONE (OUTPATIENT)
Dept: FAMILY MEDICINE | Facility: CLINIC | Age: 38
End: 2023-02-16

## 2023-02-16 NOTE — TELEPHONE ENCOUNTER
Call attempt x2 for health and wellness coaching appointment #9 as part of the 24-week Healthy Lifestyle Plan.    Sent email and SavvySystemshart message to offer assistance in getting the appt rescheduled.    May call the main scheduling line for assistance as well:  [phone].     SIGNATURE:  MARYCHUY Brooks, [facility]  National Board-Certified Health &   24-Week Healthy Lifestyle Plan Health   [facility] Weight Management Program  email:  [email or web address]  appointment [phone]

## 2023-03-06 ENCOUNTER — VIRTUAL VISIT (OUTPATIENT)
Dept: SURGERY | Facility: CLINIC | Age: 38
End: 2023-03-06
Payer: COMMERCIAL

## 2023-03-06 DIAGNOSIS — E66.9 OBESITY (BMI 30-39.9): Primary | ICD-10-CM

## 2023-03-06 PROCEDURE — 97803 MED NUTRITION INDIV SUBSEQ: CPT | Mod: VID | Performed by: DIETITIAN, REGISTERED

## 2023-03-06 NOTE — LETTER
3/6/2023         RE: Alfred Rosario  [address] [place] [address]        Dear Colleague,    Thank you for referring your patient, Alfred Rosario, to the [facility]GERY [facility] BARIATRICS CARE [place]. Please see a copy of my visit note below.    Alfred Rosario is a 37 year old who is being evaluated via a billable video visit.     Medical  Weight Loss Follow-Up Diet Evaluation - 24 Week Program  Assessment:  Alfred is presenting today for a follow up weight management nutrition consultation. Pt has had an initial appointment with Dr. Knox  RD visit #5   Weight loss medication: Metformin  Pt's weight is 284 lb  Initial weight: 307 lb   Weight change: down 23 lbs    No flowsheet data found.  BMI: There is no height or weight on file to calculate BMI.  Patient weight not recorded    Estimated RMR ([place]-St Jeor equation):  2353 kcals x 1.2 (sedentary) = 2824 kcals (for weight maintenance)  2353 kcals x 1.3 (light active) = 3236 kcals (for weight maintenance)  Recommended Protein Intake: 120-140 grams of protein/day  Patient Active Problem List:  Patient Active Problem List   Diagnosis     Hypertension, uncontrolled     Dilated cardiomyopathy (H)     LEE ANN (obstructive sleep apnea)     Morbid obesity with BMI of 40.0-44.9, adult (H)     Paroxysmal A-fib (H)     Benign essential hypertension     Dyslipidemia     Metabolic syndrome     Pre-diabetes     Hepatic steatosis     Diabetes: No    Progress on goals from last visit: He is still struggling with managing snacking especially right before dinner.   1. Have a protein source at each meal - going well, trying to do more fish, and a lot of chicken  2. Meal prep for 3 meals per day, work on being more consistent with breakfast (main focus). Consider trying meals delivery programs.  3. Limit sweets intake - if it's around, hard to avoid. He does better when its not in the house    Dietary Recall:  Wakes up at 3 am  Protein  "shake (premiere) at 6-7 am with fruit  Lunch: 11 am  Protein and vegetable, rice OR salad with chicken  Small can diet coke sometimes in afternoon  When he gets home (2 pm) will have a snack, nuts and cheese   4-6 pm  Dinner: 5 pm - Pasta, eats what the kids eat which \"isn't always the best\"   Snack: craves sweets before bed - in his mind he views it as a reward for a long day, he would like to get away from this  Goes to bed at 8 pm  Beverages:   Water  [place]  Milk: 2%, 10-12 oz   Nutrition Diagnosis:    Overweight/Obesity (NC 3.3) related to excessive calorie intake as evidenced by BMI 39.2    Intervention:  1. Food and/or nutrient delivery: High protein, meal planning for 3 meals per day  2. Nutrition education: Discussed meal timing and front loading calories and increasing breakfast meal  3. Nutrition counseling: Reviewed progress with goals, goal setting    Monitoring/Evaluation:    Goals:  4. Have a protein source at each meal  5. Meal prep for 3 meals per day, work on being more consistent with breakfast (main focus)  6. Limit sweets intake  7. Increase water with lemon - 24-32 oz bottle x2  8. Treadmill or Gym - 1-2x walk per week    Pt will follow up with health  and 4 month(s) with dietitian.     Video-Visit Details    Type of service:  Video Visit    Video Start Time (time video started): 2:33 pm    Video End Time (time video stopped): 2:55 pm    Originating Location (pt. Location): Home        Distant Location (provider location):  On-site    Mode of Communication:  Video Conference via [facility]    Physician has received verbal consent for a Video Visit from the patient? Yes      Linda Terrell        Again, thank you for allowing me to participate in the care of your patient.        Sincerely,        Linda Terrell    "

## 2023-03-06 NOTE — PROGRESS NOTES
"Alfred Rosario is a 37 year old who is being evaluated via a billable video visit.     Medical  Weight Loss Follow-Up Diet Evaluation - 24 Week Program  Assessment:  Alfred is presenting today for a follow up weight management nutrition consultation. Pt has had an initial appointment with Dr. Knox  RD visit #5   Weight loss medication: Metformin  Pt's weight is 284 lb  Initial weight: 307 lb   Weight change: down 23 lbs    No flowsheet data found.  BMI: There is no height or weight on file to calculate BMI.  Patient weight not recorded    Estimated RMR (La Follette-St Jeor equation):  2353 kcals x 1.2 (sedentary) = 2824 kcals (for weight maintenance)  2353 kcals x 1.3 (light active) = 3236 kcals (for weight maintenance)  Recommended Protein Intake: 120-140 grams of protein/day  Patient Active Problem List:  Patient Active Problem List   Diagnosis     Hypertension, uncontrolled     Dilated cardiomyopathy (H)     LEE ANN (obstructive sleep apnea)     Morbid obesity with BMI of 40.0-44.9, adult (H)     Paroxysmal A-fib (H)     Benign essential hypertension     Dyslipidemia     Metabolic syndrome     Pre-diabetes     Hepatic steatosis     Diabetes: No    Progress on goals from last visit: He is still struggling with managing snacking especially right before dinner.   1. Have a protein source at each meal - going well, trying to do more fish, and a lot of chicken  2. Meal prep for 3 meals per day, work on being more consistent with breakfast (main focus). Consider trying meals delivery programs.  3. Limit sweets intake - if it's around, hard to avoid. He does better when its not in the house    Dietary Recall:  Wakes up at 3 am  Protein shake (premiere) at 6-7 am with fruit  Lunch: 11 am  Protein and vegetable, rice OR salad with chicken  Small can diet coke sometimes in afternoon  When he gets home (2 pm) will have a snack, nuts and cheese   4-6 pm  Dinner: 5 pm - Pasta, eats what the kids eat which \"isn't always the " [address] THERAPY  [facility]  DAILY NOTE    TIME   SLP Individual Minutes  Time In: 0940  Time Out: 1003  Minutes: 23  Timed Code Treatment Minutes: 23 Minutes        Date: 7/15/2021  Patient Name: Eloisa Fortune      CSN: [id number]   : 1934  (79 y.o.)  Gender: female   Referring Physician: Darrel Razo MD  Diagnosis: S/p craniotomy   Secondary Diagnosis: Dysphagia, cognitive linguistic deficits  Precautions: Fall risk, aspiration precautions   Current Diet: Regular, thin liquids   Swallowing Strategies: Full Upright Position, Small Bite/Sip and Alternate Solids and Liquids     Date of Last MBS/FEES: Not Applicable    Pain:   - Pain location: headache and neck - RN aware and administering pain medication    Subjective:  Upon arrival to room, patient using bathroom resulting in tx session starting 10 minutes late. RN Kay Arriaza at bedside requesting to complete med pass during session. Patient alert and fully cooperative. No family present. Short-Term Goals:  SHORT TERM GOAL #1:  Goal 1: Pt will consume a regular diet and thin liquids without overt s/s of aspiration to meet nutritional/hydration measures safely. INTERVENTIONS: Did not address this session         SHORT TERM GOAL #2:  Goal 2: Patient will complete functional immediate/delayed/prospective recall tasks (inclusion of compensatory strategies) with 80% accuracy, min cues to improve retention of pertinent information. INTERVENTIONS:   Orientation  Place -  [address] [place] [address]- independent  Month - independent  [place] - independent   Date - independent   Time - min cues to utilize clock. Delayed Recall of ST Information from previous session (3 Units):   2/3 given min cues to locate list and min assistance with reading visual cue d/t vision deficit.       SHORT TERM GOAL #3:  Goal 3: Pt will complete problem solving, verbal/visual reasoning, and executive "best\"   Snack: craves sweets before bed - in his mind he views it as a reward for a long day, he would like to get away from this  Goes to bed at 8 pm  Beverages:   Water  [place]  Milk: 2%, 10-12 oz   Nutrition Diagnosis:    Overweight/Obesity (NC 3.3) related to excessive calorie intake as evidenced by BMI 39.2    Intervention:  1. Food and/or nutrient delivery: High protein, meal planning for 3 meals per day  2. Nutrition education: Discussed meal timing and front loading calories and increasing breakfast meal  3. Nutrition counseling: Reviewed progress with goals, goal setting    Monitoring/Evaluation:    Goals:  4. Have a protein source at each meal  5. Meal prep for 3 meals per day, work on being more consistent with breakfast (main focus)  6. Limit sweets intake  7. Increase water with lemon - 24-32 oz bottle x2  8. Treadmill or Gym - 1-2x walk per week    Pt will follow up with health  and 4 month(s) with dietitian.     Video-Visit Details    Type of service:  Video Visit    Video Start Time (time video started): 2:33 pm    Video End Time (time video stopped): 2:55 pm    Originating Location (pt. Location): Home        Distant Location (provider location):  On-site    Mode of Communication:  Video Conference via [facility]    Physician has received verbal consent for a Video Visit from the patient? Yes      Linda Terrell    " functioning tasks (safety time, basic money/math comprehensive of preset up pill box) with 70% accuracy, mod cues to improve contribution within ADLs/IADLs. INTERVENTIONS: Did not address this session d/t focus on other goals. Prior Session  Functional problem solving/comprehension of already set up pill box:   Determining appropriate slot for current [place]: indep   Locating ALL tabs for Wednesday: required max assist  Determining next appropriate Wednesday tab according to current TOD: moderate assist   Determining mistakes made on pill box: 1/2 mod cues, 1/2 Total A   *severely poor success given decreased visual acuity, poor utilization of tactile skills/feedback and significantly poor mental flexibility with rigid thinking present     SHORT TERM GOAL #4:  Goal 4: Pt will complete sequencing and thought organization tasks (i.e. divergent naming, multi step commands, complex yes/no questions) with 70% accuracy, mod cues to improve mental flexibility and processing speed. INTERVENTIONS:  ST generated a 6 item grocery list. Patient was prompted to identify which department each item was located. Patient correctly identified department 5/6 trials independently, 1/6 given mod verbal cues d/t word finding difficulty with \"dairy\". *adequate thought organization   *slow processing speed observed   *increased difficulty possibly s/t visual impairment - poor working memory which impacts accuracy with verbally presented information. ST then prompted patient to chunk items on list that would be in the same department. Patient chunked items together 2/6 independently and 4/6 given mod-max cues. *poor success with task   *decreased comprehension of task - patient listing items not on list that would be found in dairy department, meat department, and produce department.         SHORT TERM GOAL #5:  Goal 5: Pt will complete functional attention tasks (i.e. sustained, selective, alternating) with no more than x3-4 errors/redirections within task completion for successful management of ADL's post discharge. INTERVENTIONS:   Pt was prompted to say \"bonifacio\" for red suit and \"halloween\" for black suit. Pt independently identified \"bonifacio\" or \"halloween\" correctly 49/52 cards, 2/52 self corrected with min cues, and 1/52 given total assist,  and completed task in 2:52 minutes. *good sustained and selective attention noted with task   *slower processing speed       Long-Term Goals:  Timeframe for Long-term Goals: 2 weeks:  Goal 1: Pt will improve cognitive-linguistic skills to a supervision level of assist for safe, functional return to home setting with family support post discharge. Goal 2: Pt will safely consume regular diet+thin liquids demonstrating independent carryover of trained compensatory swallowing strategies and without overt s/s aspiration or pulmonary compromise to assist with nutrition/hydration measures. Comprehension: 3 - Patient understands basic needs 50-74% of the time  Expression: 4 - Expresses basic ideas/needs 75-90%+ of the time  Social Interaction: 4 - Patient appropriate 75-90%+ of the time  Problem Solvin - Patient solves simple/routine tasks 25%-49%  Memory: 3 - Patient remembers 50%-74% of the time      EDUCATION:  Learner: Patient  Education:  Reviewed ST goals and Plan of Care and Reviewed recommendations for follow-up  Evaluation of Education: Demonstrates with assistance, Needs further instruction and Family not present    ASSESSMENT/PLAN:  Activity Tolerance:  Patient tolerance of  treatment: good. Cooperative with ST and POC. Assessment/Plan: Patient progressing toward established goals. Continues to require skilled care of licensed speech pathologist to progress toward achievement of established goals and plan of care. Plan for Next Session: sequencing and problem solving/reasoning.        [facility]) [address] M[address]

## 2023-03-06 NOTE — PATIENT INSTRUCTIONS
Mindful Eating  [email or web address]     Tips for Weight Loss and Weight Management  [email or web address]    Building a Healthy Relationship with Food  [email or web address]    Emotional Eating: How to [place]  [email or web address]."[employer]Hotelscan/[email or web address],0750

## 2023-03-31 DIAGNOSIS — I48.0 PAROXYSMAL A-FIB (H): ICD-10-CM

## 2023-04-01 ENCOUNTER — HEALTH MAINTENANCE LETTER (OUTPATIENT)
Age: 38
End: 2023-04-01

## 2023-04-19 ENCOUNTER — TELEPHONE (OUTPATIENT)
Dept: SURGERY | Facility: CLINIC | Age: 38
End: 2023-04-19
Payer: COMMERCIAL

## 2023-04-19 DIAGNOSIS — R73.09 ELEVATED HEMOGLOBIN A1C: ICD-10-CM

## 2023-04-19 DIAGNOSIS — E66.01 MORBID OBESITY (H): Primary | ICD-10-CM

## 2023-04-19 RX ORDER — SEMAGLUTIDE 0.25 MG/.5ML
0.25 INJECTION, SOLUTION SUBCUTANEOUS WEEKLY
Qty: 2 ML | Refills: 0 | Status: SHIPPED | OUTPATIENT
Start: 2023-04-19 | End: 2023-05-17

## 2023-04-19 RX ORDER — SEMAGLUTIDE 1 MG/.5ML
1 INJECTION, SOLUTION SUBCUTANEOUS WEEKLY
Qty: 2 ML | Refills: 0 | Status: SHIPPED | OUTPATIENT
Start: 2023-06-19 | End: 2023-07-17

## 2023-04-19 RX ORDER — SEMAGLUTIDE 0.5 MG/.5ML
0.5 INJECTION, SOLUTION SUBCUTANEOUS WEEKLY
Qty: 2 ML | Refills: 0 | Status: SHIPPED | OUTPATIENT
Start: 2023-05-19 | End: 2023-06-16

## 2023-04-19 NOTE — TELEPHONE ENCOUNTER
Patient has a new insurance for 2023. He would like to see if his new insurance will cover Wegovy or Saxenda. These were previously denied in 2022 by his old insurance.    [phone] okay to leave VM

## 2023-04-21 ENCOUNTER — TELEPHONE (OUTPATIENT)
Dept: SURGERY | Facility: CLINIC | Age: 38
End: 2023-04-21
Payer: COMMERCIAL

## 2023-04-21 NOTE — TELEPHONE ENCOUNTER
Prior Authorization Retail Medication Request    Medication/Dose: Wegovy titrating dose, starting at 0.25mg injections weekly for a month and then increasing monthly if tolerated to a max dose of 2.4mg (0.25mg, 0.5mg, 1mg, 1.7mg, 2.4mg)    Insurance Name:  [employer] ([place] [employer])  Insurance ID:  [id number]    Pharmacy Information (if different than what is on RX)  Name:  [facility]  Phone:  [phone]

## 2023-04-26 NOTE — TELEPHONE ENCOUNTER
Central Prior Authorization Team   Phone: [phone]      PA Initiation    Medication: Semaglutide-Weight Management (WEGOVY) 0.25 MG/0.5ML pen - PA INITIATED  Insurance Company: [employer] - Phone [phone] Fax [phone]  Ph[facility]illing the [id number] - [place], [place] - [address]  Filling Pharmacy Phone: [phone]  Filling Pharmacy Fax:    Start Date: 4/26/2023

## 2023-04-28 NOTE — TELEPHONE ENCOUNTER
Prior Authorization Approval    Authorization Effective Date: 4/28/2023  Authorization Expiration Date: 11/24/2023  Medication: Semaglutide-Weight Management (WEGOVY) 0.25 MG/0.5ML pen - PA APPROVED  Insurance Company: [employer] - Phone [phone] Fax [phone]  Which Pharmacy is filling the prescription (Not needed for infusion/clinic administered): [id number] - [place], [place] - [address]  Pharmacy Notified: Yes - tried calling pharmacy twice and had long hold times each time and never got through to anyone.  Patient Notified: Yes - called patient, no answer so left v/m notifying of a PA approval and to contact pharmacy to have filled.

## 2023-05-12 ENCOUNTER — VIRTUAL VISIT (OUTPATIENT)
Dept: SURGERY | Facility: CLINIC | Age: 38
End: 2023-05-12
Payer: COMMERCIAL

## 2023-05-12 VITALS — WEIGHT: 296 LBS | HEIGHT: 72 IN | BODY MASS INDEX: 40.09 KG/M2

## 2023-05-12 DIAGNOSIS — E88.810 METABOLIC SYNDROME: ICD-10-CM

## 2023-05-12 DIAGNOSIS — E66.01 MORBID OBESITY WITH BMI OF 40.0-44.9, ADULT (H): Primary | ICD-10-CM

## 2023-05-12 DIAGNOSIS — K76.0 HEPATIC STEATOSIS: ICD-10-CM

## 2023-05-12 PROCEDURE — 99213 OFFICE O/P EST LOW 20 MIN: CPT | Mod: VID | Performed by: FAMILY MEDICINE

## 2023-05-12 NOTE — PROGRESS NOTES
Alfred Rosario is 37 year old  male who presents for a billable video visit today.    How would you like to obtain your AVS? MyChart  If dropped from the video visit, the video invitation should be resent by: Text to cell phone: [phone]  Will anyone else be joining your video visit? No      Video Start Time: 1:37    Are there any specific questions or needs that you would like addressed at your visit today? Patient just started Wegovy 2 days ago.    Bariatric Follow-up    37 year old  male BMI:Body mass index is 40.14 kg/m .    Weight:   Wt Readings from Last 1 Encounters:   05/12/23 134.3 kg (296 lb)    pounds    Comorbidities:  Patient Active Problem List   Diagnosis     Hypertension, uncontrolled     Dilated cardiomyopathy (H)     LEE ANN (obstructive sleep apnea)     Morbid obesity with BMI of 40.0-44.9, adult (H)     Paroxysmal A-fib (H)     Benign essential hypertension     Dyslipidemia     Metabolic syndrome     Pre-diabetes     Hepatic steatosis       Interim: Initial weight 307# started Wegovy two days ago. Did not experience nausea. Pharmacy was out of 0.5mg so got the .25 and the 1.0mg. No fatigue, not tired    Plan:  DIET  Protein first, consider brown rice instead of white rice,    EXERCISE take a walk a day   PHARMACOTHERAPY continue to ramp up Wegovy Let me know 2 weeks before needing to ramp upt to 1.7mg and 2.4mg    LTG consistent physical activity, more whole foods.      -We reviewed his medications and whether associated with weight gain.    Current Outpatient Medications:      buPROPion (WELLBUTRIN XL) 150 MG 24 hr tablet, Take 1 tablet (150 mg) by mouth every morning, Disp: 90 tablet, Rfl: 3     carvedilol (COREG) 25 MG tablet, Take 1.5 tablets (37.5 mg) by mouth 2 times daily (with meals), Disp: 270 tablet, Rfl: 3     docosahexaenoic acid-epa 120-180 mg cap, [DOCOSAHEXAENOIC ACID--180 MG CAP] Take 2 g by mouth daily., Disp: , Rfl:      melatonin 5 MG tablet, Take 5 mg by  mouth nightly as needed for sleep, Disp: , Rfl:      metFORMIN (GLUCOPHAGE XR) 500 MG 24 hr tablet, Take 1 tablet (500 mg) by mouth 2 times daily (with meals), Disp: 180 tablet, Rfl: 3     multivitamin (MEN'S MULTI-VITAMIN) per tablet, [MULTIVITAMIN (MEN'S MULTI-VITAMIN) PER TABLET] Take 1 tablet by mouth daily., Disp: , Rfl:      rivaroxaban ANTICOAGULANT (XARELTO ANTICOAGULANT) 20 MG TABS tablet, Take 1 tablet (20 mg) by mouth daily Must follow-up with Dr. Fisher for continued refills, Disp: 90 tablet, Rfl: 0     sacubitril-valsartan (ENTRESTO)  MG per tablet, Take 1 tablet by mouth 2 times daily, Disp: 60 tablet, Rfl: 11     Semaglutide-Weight Management (WEGOVY) 0.25 MG/0.5ML pen, Inject 0.25 mg Subcutaneous once a week for 28 days, Disp: 2 mL, Rfl: 0     [START ON 5/19/2023] Semaglutide-Weight Management (WEGOVY) 0.5 MG/0.5ML pen, Inject 0.5 mg Subcutaneous once a week for 28 days, Disp: 2 mL, Rfl: 0     [START ON 6/19/2023] Semaglutide-Weight Management (WEGOVY) 1 MG/0.5ML pen, Inject 1 mg Subcutaneous once a week for 28 days, Disp: 2 mL, Rfl: 0     valsartan (DIOVAN) 320 MG tablet, Take 1 tablet (320 mg) by mouth daily, Disp: 90 tablet, Rfl: 3     furosemide (LASIX) 20 MG tablet, TAKE 1 TABLET (20 MG) BY MOUTH DAILY AS NEEDED (INCREASE WT GAIN) (Patient not taking: Reported on 5/12/2023), Disp: 90 tablet, Rfl: 1     naltrexone (DEPADE/REVIA) 50 MG tablet, Take 1/2 tablet with evening meal (Patient not taking: Reported on 5/12/2023), Disp: 45 tablet, Rfl: 3      We discussed [facility] Bariatric Basics including:  -eating 3 meals daily  -eating protein first  -eating slowly, chewing food well  -avoiding/limiting calorie containing beverages  -choosing wheat, not white with breads, crackers, pastas, nik, bagels, tortillas, rice  -limiting restaurant or cafeteria eating to twice a week or less  -We discussed the importance of restorative sleep and stress management in maintaining a healthy weight.  -We  "discussed insulin resistance and glycemic index as it relates to appetite and weight control  -We discussed the National Weight Control Registry healthy weight maintenance strategies and ways to optimize metabolism.  -We discussed the importance of physical activity including cardiovascular and strength training in maintaining a healthier weight and explored viable options.    Most recent labs:  Lab Results   Component Value Date    WBC 27.9 (H) 05/05/2022    HGB 13.1 (L) 05/05/2022    HCT 39.6 (L) 05/05/2022    MCV 90 05/05/2022     05/05/2022     Lab Results   Component Value Date    CHOL 191 02/09/2011     Lab Results   Component Value Date    HDL 43 03/27/2012       Lab Results   Component Value Date    TRIG 162 (H) 03/27/2012     Lab Results   Component Value Date    ALT 23 05/04/2022    AST 21 05/04/2022    ALKPHOS 53 05/04/2022       Lab Results   Component Value Date    TSH 2.80 02/10/2011       DIETARY HISTORY  Meals Per Day: 3  Eating Protein First?: yes  Food Diary: B:fruit, eggs if has times or yogurt L:salad sometimes a lot of time white rice and beef or chicken or Pasta D:spachetti with Japanese bread  Snacks Per Day: \"not a whole lot\"  Typical Snack: granola bar, nut packs, pistachios  Fluid Intake: \"good on water\"  Portion Control: improved  Calorie Containing Beverages: no except skim-  Alcohol per week: no  Typical Protein Food Choices: mostly beans and chicken,   Choosing Whole Grains: whole grain 12 grain  Grocery Shopping is done by: shared  Food Preparation is done by: shared  Meals at Restaurant/Cafeteria/Take Out Per Week: 0-1  Eating at the Table: yes  TV is Off During Meals: yes    Positive Changes Since Last Visit: portions  Struggling With: portions, carb    Knowledgeable in Reading Food Labels: yes  Getting Adequate Protein: yes  Sleeping 7-8 hours/day well  Stress management low/mod    PHYSICAL ACTIVITY PATTERNS:  Cardiovascular: outside more going to the park. Stairs at work " with  Strength Training: free weights on the treadmill    REVIEW OF SYSTEMS     PHYSICAL EXAM: (most recent vitals and today's stated weight)  Vitals: Ht 1.829 m (6')   Wt 134.3 kg (296 lb)   BMI 40.14 kg/m        GEN: Pleasant and in no acute distress  PSYCH: A&OX3,     I have reviewed the note as documented above.  This accurately captures the substance of my conversation with the patient.  Thank you for the opportunity to participate in the care of your patient.    Bailey Knox MD, FAAFP  [facility]  Diplomate, American Board of Obesity Medicine    Total time spent on the date of this encounter doing: chart review, review of test results, patient visit, physical exam, education, counseling, developing plan of care, and documenting = 25 minutes.          Video-Visit Details    Type of service:  Video Visit    Platform used for Video Visit: [employer]    Video End Time (time video stopped): 2:02 PM    Originating Location (pt. Location): Home        Distant Location (provider location):  On-site    Distant Location (provider location):  [facility] SURGERY [facility] [place]

## 2023-05-12 NOTE — LETTER
5/12/2023         RE: Alfred Rosario  [address] [place] [address]        Dear Colleague,    Thank you for referring your patient, Alfred Rosario, to the [facility] SURGERY CLINIC AND BARIATRICS CARE [place]. Please see a copy of my visit note below.    Alfred Rosario is 37 year old  male who presents for a billable video visit today.    How would you like to obtain your AVS? MyChart  If dropped from the video visit, the video invitation should be resent by: Text to cell phone: [phone]  Will anyone else be joining your video visit? No      Video Start Time: 1:37    Are there any specific questions or needs that you would like addressed at your visit today? Patient just started Wegovy 2 days ago.    Bariatric Follow-up    37 year old  male BMI:Body mass index is 40.14 kg/m .    Weight:   Wt Readings from Last 1 Encounters:   05/12/23 134.3 kg (296 lb)    pounds    Comorbidities:  Patient Active Problem List   Diagnosis     Hypertension, uncontrolled     Dilated cardiomyopathy (H)     LEE ANN (obstructive sleep apnea)     Morbid obesity with BMI of 40.0-44.9, adult (H)     Paroxysmal A-fib (H)     Benign essential hypertension     Dyslipidemia     Metabolic syndrome     Pre-diabetes     Hepatic steatosis       Interim: Initial weight 307# started Wegovy two days ago. Did not experience nausea. Pharmacy was out of 0.5mg so got the .25 and the 1.0mg. No fatigue, not tired    Plan:  DIET  Protein first, consider brown rice instead of white rice,    EXERCISE take a walk a day   PHARMACOTHERAPY continue to ramp up Wegovy Let me know 2 weeks before needing to ramp upt to 1.7mg and 2.4mg    LTG consistent physical activity, more whole foods.      -We reviewed his medications and whether associated with weight gain.    Current Outpatient Medications:      buPROPion (WELLBUTRIN XL) 150 MG 24 hr tablet, Take 1 tablet (150 mg) by mouth every morning, Disp: 90 tablet, Rfl: 3      carvedilol (COREG) 25 MG tablet, Take 1.5 tablets (37.5 mg) by mouth 2 times daily (with meals), Disp: 270 tablet, Rfl: 3     docosahexaenoic acid-epa 120-180 mg cap, [DOCOSAHEXAENOIC ACID--180 MG CAP] Take 2 g by mouth daily., Disp: , Rfl:      melatonin 5 MG tablet, Take 5 mg by mouth nightly as needed for sleep, Disp: , Rfl:      metFORMIN (GLUCOPHAGE XR) 500 MG 24 hr tablet, Take 1 tablet (500 mg) by mouth 2 times daily (with meals), Disp: 180 tablet, Rfl: 3     multivitamin (MEN'S MULTI-VITAMIN) per tablet, [MULTIVITAMIN (MEN'S MULTI-VITAMIN) PER TABLET] Take 1 tablet by mouth daily., Disp: , Rfl:      rivaroxaban ANTICOAGULANT (XARELTO ANTICOAGULANT) 20 MG TABS tablet, Take 1 tablet (20 mg) by mouth daily Must follow-up with Dr. Fisher for continued refills, Disp: 90 tablet, Rfl: 0     sacubitril-valsartan (ENTRESTO)  MG per tablet, Take 1 tablet by mouth 2 times daily, Disp: 60 tablet, Rfl: 11     Semaglutide-Weight Management (WEGOVY) 0.25 MG/0.5ML pen, Inject 0.25 mg Subcutaneous once a week for 28 days, Disp: 2 mL, Rfl: 0     [START ON 5/19/2023] Semaglutide-Weight Management (WEGOVY) 0.5 MG/0.5ML pen, Inject 0.5 mg Subcutaneous once a week for 28 days, Disp: 2 mL, Rfl: 0     [START ON 6/19/2023] Semaglutide-Weight Management (WEGOVY) 1 MG/0.5ML pen, Inject 1 mg Subcutaneous once a week for 28 days, Disp: 2 mL, Rfl: 0     valsartan (DIOVAN) 320 MG tablet, Take 1 tablet (320 mg) by mouth daily, Disp: 90 tablet, Rfl: 3     furosemide (LASIX) 20 MG tablet, TAKE 1 TABLET (20 MG) BY MOUTH DAILY AS NEEDED (INCREASE WT GAIN) (Patient not taking: Reported on 5/12/2023), Disp: 90 tablet, Rfl: 1     naltrexone (DEPADE/REVIA) 50 MG tablet, Take 1/2 tablet with evening meal (Patient not taking: Reported on 5/12/2023), Disp: 45 tablet, Rfl: 3      We discussed [facility] Bariatric Basics including:  -eating 3 meals daily  -eating protein first  -eating slowly, chewing food well  -avoiding/limiting calorie  "containing beverages  -choosing wheat, not white with breads, crackers, pastas, nik, bagels, tortillas, rice  -limiting restaurant or cafeteria eating to twice a week or less  -We discussed the importance of restorative sleep and stress management in maintaining a healthy weight.  -We discussed insulin resistance and glycemic index as it relates to appetite and weight control  -We discussed the National Weight Control Registry healthy weight maintenance strategies and ways to optimize metabolism.  -We discussed the importance of physical activity including cardiovascular and strength training in maintaining a healthier weight and explored viable options.    Most recent labs:  Lab Results   Component Value Date    WBC 27.9 (H) 05/05/2022    HGB 13.1 (L) 05/05/2022    HCT 39.6 (L) 05/05/2022    MCV 90 05/05/2022     05/05/2022     Lab Results   Component Value Date    CHOL 191 02/09/2011     Lab Results   Component Value Date    HDL 43 03/27/2012       Lab Results   Component Value Date    TRIG 162 (H) 03/27/2012     Lab Results   Component Value Date    ALT 23 05/04/2022    AST 21 05/04/2022    ALKPHOS 53 05/04/2022       Lab Results   Component Value Date    TSH 2.80 02/10/2011       DIETARY HISTORY  Meals Per Day: 3  Eating Protein First?: yes  Food Diary: B:fruit, eggs if has times or yogurt L:salad sometimes a lot of time white rice and beef or chicken or Pasta D:spachetti with Mongolian bread  Snacks Per Day: \"not a whole lot\"  Typical Snack: granola bar, nut packs, pistachios  Fluid Intake: \"good on water\"  Portion Control: improved  Calorie Containing Beverages: no except skim-  Alcohol per week: no  Typical Protein Food Choices: mostly beans and chicken,   Choosing Whole Grains: whole grain 12 grain  Grocery Shopping is done by: shared  Food Preparation is done by: shared  Meals at Restaurant/Cafeteria/Take Out Per Week: 0-1  Eating at the Table: yes  TV is Off During Meals: yes    Positive Changes Since " Last Visit: portions  Struggling With: portions, carb    Knowledgeable in Reading Food Labels: yes  Getting Adequate Protein: yes  Sleeping 7-8 hours/day well  Stress management low/mod    PHYSICAL ACTIVITY PATTERNS:  Cardiovascular: outside more going to the park. Stairs at work with  Strength Training: free weights on the treadmill    REVIEW OF SYSTEMS     PHYSICAL EXAM: (most recent vitals and today's stated weight)  Vitals: Ht 1.829 m (6')   Wt 134.3 kg (296 lb)   BMI 40.14 kg/m        GEN: Pleasant and in no acute distress  PSYCH: A&OX3,     I have reviewed the note as documented above.  This accurately captures the substance of my conversation with the patient.  Thank you for the opportunity to participate in the care of your patient.    Bailey Knox MD, FAAFP  [facility]  Diplomate, American Board of Obesity Medicine    Total time spent on the date of this encounter doing: chart review, review of test results, patient visit, physical exam, education, counseling, developing plan of care, and documenting = 25 minutes.          Video-Visit Details    Type of service:  Video Visit    Platform used for Video Visit: Social Project    Video End Time (time video stopped): 2:02 PM    Originating Location (pt. Location): Home        Distant Location (provider location):  On-site    Distant Location (provider location):  [facility] SURGERY [facility] AND BARIATRICS CARE [place]       Again, thank you for allowing me to participate in the care of your patient.        Sincerely,        Bailey Knox MD

## 2023-06-13 DIAGNOSIS — I48.0 PAROXYSMAL A-FIB (H): ICD-10-CM

## 2023-06-13 DIAGNOSIS — I42.0 DILATED CARDIOMYOPATHY (H): ICD-10-CM

## 2023-06-14 RX ORDER — RIVAROXABAN 20 MG/1
TABLET, FILM COATED ORAL
Qty: 30 TABLET | Refills: 0 | Status: SHIPPED | OUTPATIENT
Start: 2023-06-14 | End: 2023-08-10

## 2023-06-14 RX ORDER — SACUBITRIL AND VALSARTAN 97; 103 MG/1; MG/1
1 TABLET, FILM COATED ORAL 2 TIMES DAILY
Qty: 60 TABLET | Refills: 0 | Status: SHIPPED | OUTPATIENT
Start: 2023-06-14

## 2023-06-26 DIAGNOSIS — I50.21 ACUTE SYSTOLIC CONGESTIVE HEART FAILURE (H): ICD-10-CM

## 2023-06-27 RX ORDER — FUROSEMIDE 20 MG
20 TABLET ORAL DAILY PRN
Qty: 30 TABLET | Refills: 0 | Status: SHIPPED | OUTPATIENT
Start: 2023-06-27 | End: 2023-07-14

## 2023-07-10 ENCOUNTER — TELEPHONE (OUTPATIENT)
Dept: SURGERY | Facility: CLINIC | Age: 38
End: 2023-07-10

## 2023-07-10 NOTE — TELEPHONE ENCOUNTER
Sent patient a text message video link to connect. Called patient when he was not connected for the video nutrition visit. Left voicemail instructing patient to connect or to call the clinic (phone number provided) to reschedule appointment.    Linda Terrell RD, LD

## 2023-07-14 DIAGNOSIS — I50.21 ACUTE SYSTOLIC CONGESTIVE HEART FAILURE (H): ICD-10-CM

## 2023-07-14 RX ORDER — FUROSEMIDE 20 MG
20 TABLET ORAL DAILY PRN
Qty: 90 TABLET | Refills: 0 | Status: SHIPPED | OUTPATIENT
Start: 2023-07-14

## 2023-08-10 DIAGNOSIS — I48.0 PAROXYSMAL A-FIB (H): ICD-10-CM

## 2023-08-10 RX ORDER — RIVAROXABAN 20 MG/1
TABLET, FILM COATED ORAL
Qty: 30 TABLET | Refills: 0 | Status: SHIPPED | OUTPATIENT
Start: 2023-08-10

## 2023-10-10 DIAGNOSIS — I10 HYPERTENSION, UNCONTROLLED: ICD-10-CM

## 2023-10-10 RX ORDER — CARVEDILOL 25 MG/1
37.5 TABLET ORAL 2 TIMES DAILY WITH MEALS
Qty: 90 TABLET | Refills: 0 | Status: SHIPPED | OUTPATIENT
Start: 2023-10-10 | End: 2023-11-10

## 2023-10-18 DIAGNOSIS — E66.01 MORBID OBESITY WITH BMI OF 40.0-44.9, ADULT (H): ICD-10-CM

## 2023-10-18 DIAGNOSIS — R73.09 ELEVATED HEMOGLOBIN A1C: ICD-10-CM

## 2023-10-18 DIAGNOSIS — E88.810 METABOLIC SYNDROME: ICD-10-CM

## 2023-10-19 RX ORDER — METFORMIN HCL 500 MG
500 TABLET, EXTENDED RELEASE 24 HR ORAL 2 TIMES DAILY WITH MEALS
Qty: 180 TABLET | Refills: 3 | Status: SHIPPED | OUTPATIENT
Start: 2023-10-19

## 2023-11-10 DIAGNOSIS — I10 HYPERTENSION, UNCONTROLLED: ICD-10-CM

## 2023-11-13 RX ORDER — CARVEDILOL 25 MG/1
37.5 TABLET ORAL 2 TIMES DAILY WITH MEALS
Qty: 90 TABLET | Refills: 0 | Status: SHIPPED | OUTPATIENT
Start: 2023-11-13 | End: 2023-12-13

## 2023-12-13 DIAGNOSIS — I10 HYPERTENSION, UNCONTROLLED: ICD-10-CM

## 2023-12-14 RX ORDER — CARVEDILOL 25 MG/1
37.5 TABLET ORAL 2 TIMES DAILY WITH MEALS
Qty: 90 TABLET | Refills: 0 | Status: SHIPPED | OUTPATIENT
Start: 2023-12-14 | End: 2024-01-08

## 2024-01-08 DIAGNOSIS — I10 HYPERTENSION, UNCONTROLLED: ICD-10-CM

## 2024-01-09 RX ORDER — CARVEDILOL 25 MG/1
37.5 TABLET ORAL 2 TIMES DAILY WITH MEALS
Qty: 90 TABLET | Refills: 0 | Status: SHIPPED | OUTPATIENT
Start: 2024-01-09 | End: 2024-02-13

## 2024-02-07 DIAGNOSIS — I10 HYPERTENSION, UNCONTROLLED: ICD-10-CM

## 2024-02-07 NOTE — TELEPHONE ENCOUNTER
Refill request for the following medication(s):  Pending Prescriptions:                       Disp   Refills    valsartan (DIOVAN) 320 MG tablet          90 tab*0            Sig: Take 1 tablet (320 mg) by mouth daily      Pharmacy: [facility]/PHARMACY #[id number] [place], [place] - [address]

## 2024-02-08 RX ORDER — VALSARTAN 320 MG/1
320 TABLET ORAL DAILY
Qty: 90 TABLET | Refills: 0 | Status: SHIPPED | OUTPATIENT
Start: 2024-02-08

## 2024-02-13 DIAGNOSIS — I10 HYPERTENSION, UNCONTROLLED: ICD-10-CM

## 2024-02-13 RX ORDER — CARVEDILOL 25 MG/1
37.5 TABLET ORAL 2 TIMES DAILY WITH MEALS
Qty: 90 TABLET | Refills: 0 | Status: SHIPPED | OUTPATIENT
Start: 2024-02-13 | End: 2024-03-13

## 2024-02-13 NOTE — TELEPHONE ENCOUNTER
PATIENT REQUESTING 90 DAY SUPPLY -     Refill Request  Medication name: Pending Prescriptions:                       Disp   Refills    carvedilol (COREG) 25 MG tablet           90 tab*0            Sig: Take 1.5 tablets (37.5 mg) by mouth 2 times daily           (with meals)    Requested Pharmacy:  [facility]/PHARMACY #[id number] - [place], [place] - [address]

## 2024-03-13 DIAGNOSIS — I10 HYPERTENSION, UNCONTROLLED: ICD-10-CM

## 2024-03-13 RX ORDER — CARVEDILOL 25 MG/1
37.5 TABLET ORAL 2 TIMES DAILY WITH MEALS
Qty: 90 TABLET | Refills: 0 | Status: SHIPPED | OUTPATIENT
Start: 2024-03-13 | End: 2024-04-02

## 2024-04-01 DIAGNOSIS — I10 HYPERTENSION, UNCONTROLLED: ICD-10-CM

## 2024-04-02 RX ORDER — CARVEDILOL 25 MG/1
37.5 TABLET ORAL 2 TIMES DAILY WITH MEALS
Qty: 90 TABLET | Refills: 0 | Status: SHIPPED | OUTPATIENT
Start: 2024-04-02 | End: 2024-05-08

## 2024-05-08 DIAGNOSIS — I10 HYPERTENSION, UNCONTROLLED: ICD-10-CM

## 2024-05-08 RX ORDER — CARVEDILOL 25 MG/1
37.5 TABLET ORAL 2 TIMES DAILY WITH MEALS
Qty: 270 TABLET | Refills: 3 | Status: SHIPPED | OUTPATIENT
Start: 2024-05-08

## 2024-06-02 ENCOUNTER — HEALTH MAINTENANCE LETTER (OUTPATIENT)
Age: 39
End: 2024-06-02

## 2024-12-11 ENCOUNTER — MYC REFILL (OUTPATIENT)
Dept: FAMILY MEDICINE | Facility: CLINIC | Age: 39
End: 2024-12-11
Payer: COMMERCIAL

## 2024-12-11 DIAGNOSIS — I10 HYPERTENSION, UNCONTROLLED: ICD-10-CM

## 2024-12-11 RX ORDER — VALSARTAN 320 MG/1
320 TABLET ORAL DAILY
Qty: 90 TABLET | Refills: 0 | Status: SHIPPED | OUTPATIENT
Start: 2024-12-11

## 2025-01-01 ENCOUNTER — HOSPITAL ENCOUNTER (EMERGENCY)
Facility: CLINIC | Age: 40
End: 2025-02-01
Attending: EMERGENCY MEDICINE | Admitting: EMERGENCY MEDICINE
Payer: OTHER MISCELLANEOUS

## 2025-01-01 VITALS — BODY MASS INDEX: 47.78 KG/M2 | WEIGHT: 315 LBS

## 2025-01-01 DIAGNOSIS — I46.9 CARDIOPULMONARY ARREST (H): ICD-10-CM

## 2025-01-01 LAB
ABO + RH BLD: NORMAL
ALBUMIN SERPL BCG-MCNC: 3.5 G/DL (ref 3.5–5.2)
ALP SERPL-CCNC: 150 U/L (ref 40–150)
ALT SERPL W P-5'-P-CCNC: 24 U/L (ref 0–70)
ANION GAP SERPL CALCULATED.3IONS-SCNC: 28 MMOL/L (ref 7–15)
AST SERPL W P-5'-P-CCNC: 29 U/L (ref 0–45)
BASE EXCESS BLDV CALC-SCNC: -17 MMOL/L (ref -3–3)
BASE EXCESS BLDV CALC-SCNC: -17 MMOL/L (ref -3–3)
BASOPHILS # BLD MANUAL: 0.1 10E3/UL (ref 0–0.2)
BASOPHILS NFR BLD MANUAL: 1 %
BILIRUB SERPL-MCNC: 0.2 MG/DL
BLD GP AB SCN SERPL QL: NEGATIVE
BUN SERPL-MCNC: 16 MG/DL (ref 6–20)
CALCIUM SERPL-MCNC: 9.6 MG/DL (ref 8.8–10.4)
CHLORIDE SERPL-SCNC: 102 MMOL/L (ref 98–107)
CPB POCT: NO
CREAT SERPL-MCNC: 1.67 MG/DL (ref 0.67–1.17)
EGFRCR SERPLBLD CKD-EPI 2021: 53 ML/MIN/1.73M2
EOSINOPHIL # BLD MANUAL: 0.1 10E3/UL (ref 0–0.7)
EOSINOPHIL NFR BLD MANUAL: 1 %
ERYTHROCYTE [DISTWIDTH] IN BLOOD BY AUTOMATED COUNT: 14.8 % (ref 10–15)
GLUCOSE BLDC GLUCOMTR-MCNC: 100 MG/DL (ref 70–99)
GLUCOSE SERPL-MCNC: 114 MG/DL (ref 70–99)
HCO3 BLDV-SCNC: 20 MMOL/L (ref 21–28)
HCO3 BLDV-SCNC: 20 MMOL/L (ref 21–28)
HCO3 SERPL-SCNC: 19 MMOL/L (ref 22–29)
HCT VFR BLD AUTO: 52.6 % (ref 40–53)
HCT VFR BLD CALC: 48 % (ref 40–53)
HGB BLD-MCNC: 16 G/DL (ref 13.3–17.7)
HGB BLD-MCNC: 16.3 G/DL (ref 13.3–17.7)
LACTATE BLD-SCNC: 16.1 MMOL/L
LYMPHOCYTES # BLD MANUAL: 3 10E3/UL (ref 0.8–5.3)
LYMPHOCYTES NFR BLD MANUAL: 28 %
MCH RBC QN AUTO: 29.1 PG (ref 26.5–33)
MCHC RBC AUTO-ENTMCNC: 30.4 G/DL (ref 31.5–36.5)
MCV RBC AUTO: 96 FL (ref 78–100)
METAMYELOCYTES # BLD MANUAL: 0.5 10E3/UL
METAMYELOCYTES NFR BLD MANUAL: 5 %
MONOCYTES # BLD MANUAL: 0.1 10E3/UL (ref 0–1.3)
MONOCYTES NFR BLD MANUAL: 1 %
MYELOCYTES # BLD MANUAL: 0.2 10E3/UL
MYELOCYTES NFR BLD MANUAL: 2 %
NEUTROPHILS # BLD MANUAL: 6.6 10E3/UL (ref 1.6–8.3)
NEUTROPHILS NFR BLD MANUAL: 62 %
NRBC # BLD AUTO: 0.2 10E3/UL
NRBC BLD MANUAL-RTO: 2 %
PCO2 BLDV: 118 MM HG (ref 40–50)
PCO2 BLDV: 118 MM HG (ref 40–50)
PH BLDV: 6.84 [PH] (ref 7.32–7.43)
PH BLDV: 6.85 [PH] (ref 7.32–7.43)
PLAT MORPH BLD: ABNORMAL
PLATELET # BLD AUTO: ABNORMAL 10*3/UL
PO2 BLDV: 11 MM HG (ref 25–47)
PO2 BLDV: 13 MM HG (ref 25–47)
POTASSIUM BLD-SCNC: 4.4 MMOL/L (ref 3.4–5.3)
POTASSIUM SERPL-SCNC: 4.6 MMOL/L (ref 3.4–5.3)
PROT SERPL-MCNC: 6.3 G/DL (ref 6.4–8.3)
RBC # BLD AUTO: 5.5 10E6/UL (ref 4.4–5.9)
RBC MORPH BLD: ABNORMAL
SAO2 % BLDV: 5 % (ref 70–75)
SAO2 % BLDV: 6 % (ref 70–75)
SODIUM BLD-SCNC: 145 MMOL/L (ref 135–145)
SODIUM SERPL-SCNC: 149 MMOL/L (ref 135–145)
SPECIMEN EXP DATE BLD: NORMAL
WBC # BLD AUTO: 10.7 10E3/UL (ref 4–11)

## 2025-01-01 PROCEDURE — 86900 BLOOD TYPING SEROLOGIC ABO: CPT | Performed by: EMERGENCY MEDICINE

## 2025-01-01 PROCEDURE — 82803 BLOOD GASES ANY COMBINATION: CPT

## 2025-01-01 PROCEDURE — 250N000011 HC RX IP 250 OP 636

## 2025-01-01 PROCEDURE — 92950 HEART/LUNG RESUSCITATION CPR: CPT

## 2025-01-01 PROCEDURE — 85048 AUTOMATED LEUKOCYTE COUNT: CPT | Performed by: EMERGENCY MEDICINE

## 2025-01-01 PROCEDURE — 85014 HEMATOCRIT: CPT | Performed by: EMERGENCY MEDICINE

## 2025-01-01 PROCEDURE — 84155 ASSAY OF PROTEIN SERUM: CPT | Performed by: EMERGENCY MEDICINE

## 2025-01-01 PROCEDURE — 82962 GLUCOSE BLOOD TEST: CPT

## 2025-01-01 PROCEDURE — 250N000009 HC RX 250

## 2025-01-01 PROCEDURE — 36415 COLL VENOUS BLD VENIPUNCTURE: CPT | Performed by: EMERGENCY MEDICINE

## 2025-01-01 PROCEDURE — 99285 EMERGENCY DEPT VISIT HI MDM: CPT

## 2025-01-01 PROCEDURE — 272N000240 HC CARDIOVERT/DEFIB/PACER SUPP

## 2025-01-01 PROCEDURE — 85007 BL SMEAR W/DIFF WBC COUNT: CPT | Performed by: EMERGENCY MEDICINE

## 2025-01-01 PROCEDURE — 80053 COMPREHEN METABOLIC PANEL: CPT | Performed by: EMERGENCY MEDICINE

## 2025-01-01 ASSESSMENT — ACTIVITIES OF DAILY LIVING (ADL)
ADLS_ACUITY_SCORE: 46

## 2025-02-01 NOTE — ED TRIAGE NOTES
EMS report: at work at [employer], called EMS for CP/SOB. Walked to stretcher then started vomiting/foaming and mouth and went unresponsive. EMS administered 1 defibrillation for VF but has been asystole since. EMS gave epinephrine x2 doses PTA. Arrives with CPR in progress, IGel in place with BVM assisted respirations. I.O in L shin.     Triage Assessment (Adult)       Row Name 02/01/25 0922          Triage Assessment    Airway WDL X;airway symptoms     Airway Symptoms artificial airway in place        Respiratory WDL    Respiratory WDL X;rhythm/pattern     Rhythm/Pattern, Respiratory other (see comments)  % FiO2 upon arrival        Cardiac WDL    Cardiac WDL X  CPR in process upon arrival        Cognitive/Neuro/Behavioral WDL    Cognitive/Neuro/Behavioral WDL X     Level of Consciousness unresponsive        Otoniel Coma Scale    Best Eye Response 1-->(E1) none     Best Motor Response 1-->(M1) none     Best Verbal Response 1-->(V1) none     [place] Coma Scale Score 3

## 2025-02-01 NOTE — ED PROVIDER NOTES
Emergency Department Note      History of Present Illness     Chief Complaint   Cardiac Arrest    HPI   Alfred Rosario is a 39 year old male on Xarelto with a history of atrial fibrillation and cardiomyopathy presenting with cardiac arrest. The patient was brought to the ED via EMS after having difficulty breathing while at work. The patient told EMS that he was having chest pain and shortness of breath. When EMS arrived he began walking toward the ambulance and started vomiting and foaming at the mouth, shortly after that he went into cardiac arrest.  Initial rhythm was ventricular fibrillation and he received 1 shock.  Patient has been in asystole for ~15 minutes prior to ED arrival.  On arrival CPR is in progress and two doses of epinephrine had been administered.  Igel placed by EMS    Independent Historian   EMS as detailed above.    Review of External Notes   N/A    Past Medical History     Medical History and Problem List   Acute respiratory failure with hypoxia   Atrial fibrillation   Congestive heart failure   Dyslipidemia  Hypertension  Hypertensive urgency  Metabolic syndrome  Morbid obesity   Obstructive sleep apnea syndrome  Hypokalemia   Panic disorder  Dilated cardiomyopathy  Anxiety   Dog bite to face    Medications   Bupropion   Carvedilol   Furosemide  Metformin   Naltrexone   Valsartan   Xarelto  Docosahexaenoic acid    Surgical History   Left foot metatarsal surgery     Physical Exam     Patient Vitals for the past 24 hrs:   Weight   02/01/25 1000 (!) 159.8 kg (352 lb 4.7 oz)     Physical Exam  General: Unresponsive.  CPR in progress.  Obese  Head:  Scalp is NC/AT  Eyes:  No scleral icterus, pupils fixed and dilated  ENT:  The external nose and ears are normal.  Igel in place  CV:  Pulseless  Resp:  Lungs clear.  No spontaneous respiratory effort.  Actively being bagged  GI:  Abdomen is soft, no distension, no tenderness. No peritoneal signs  MS:  No lower extremity edema.  IO in left  tibial plateau  Skin:  Cool and dry, No rash or lesions noted.  Neuro: Unresponsive      Diagnostics     Lab Results   Labs Ordered and Resulted from Time of ED Arrival to Time of ED Departure   COMPREHENSIVE METABOLIC PANEL - Abnormal       Result Value    Sodium 149 (*)     Potassium 4.6      Carbon Dioxide (CO2) 19 (*)     Anion Gap 28 (*)     Urea Nitrogen 16.0      Creatinine 1.67 (*)     GFR Estimate 53 (*)     Calcium 9.6      Chloride 102      Glucose 114 (*)     Alkaline Phosphatase 150      AST 29      ALT 24      Protein Total 6.3 (*)     Albumin 3.5      Bilirubin Total 0.2     CBC WITH PLATELETS AND DIFFERENTIAL - Abnormal    WBC Count 10.7      RBC Count 5.50      Hemoglobin 16.0      Hematocrit 52.6      MCV 96      MCH 29.1      MCHC 30.4 (*)     RDW 14.8      Platelet Count       MANUAL DIFFERENTIAL - Abnormal    % Neutrophils 62      % Lymphocytes 28      % Monocytes 1      % Eosinophils 1      % Basophils 1      % Metamyelocytes 5      % Myelocytes 2      Absolute Neutrophils 6.6      Absolute Lymphocytes 3.0      Absolute Monocytes 0.1      Absolute Eosinophils 0.1      Absolute Basophils 0.1      Absolute Metamyelocytes 0.5 (*)     Absolute Myelocytes 0.2 (*)     NRBCs per 100 WBC 2      Absolute NRBCs 0.2      RBC Morphology Confirmed RBC Indices      Platelet Assessment Platelets Clumped (*)    ISTAT GASES ELECTROLYTES VENOUS POCT - Abnormal    CPB Applied No      Hematocrit POCT 48      Bicarbonate Venous POCT 20 (*)     Hemoglobin POCT 16.3      Potassium POCT 4.4      Sodium POCT 145      pCO2 Venous POCT 118 (*)     pH Venous POCT 6.84 (*)     pO2 Venous POCT 11 (*)     O2 Sat, Venous POCT 5 (*)     Base Excess/Deficit (+/-) POCT -17.0 (*)    ISTAT GASES LACTATE VENOUS POCT - Abnormal    Lactic Acid POCT 16.1 (*)     Bicarbonate Venous POCT 20 (*)     O2 Sat, Venous POCT 6 (*)     pCO2 Venous POCT 118 (*)     pH Venous POCT 6.85 (*)     pO2 Venous POCT 13 (*)     Base Excess/Deficit (+/-)  POCT -17.0 (*)    GLUCOSE BY METER - Abnormal    GLUCOSE BY METER POCT 100 (*)    LACTIC ACID WHOLE BLOOD   TYPE AND SCREEN, ADULT    ABO/RH(D) O POS      SPECIMEN EXPIRATION DATE [id number]     ABO/RH TYPE AND SCREEN       Imaging   No orders to display         Independent Interpretation   None    ED Course      Medications Administered   Medications - No data to display    Procedures   Procedures     Discussion of Management   None    ED Course   ED Course as of 02/01/25 1137   Sat Feb 01, 2025   0918 I obtained the history and examined the patient as noted above.      0936 Time of death.        Additional Documentation  None    Medical Decision Making / Diagnosis     CMS Diagnoses: None    MIPS       None    MDM   Alfred Rosario is a 39 year old male who presents in cardiac arrest.  Patient was seen in the stabilization room and resuscitative efforts were continued per ACLS protocol.  Patient's initial rhythm was asystole.  Igel was placed by EMS was kept in place and patient was easily bagged by respiratory therapist.  Due to patient's morbid obesity, chest compressions were done manually as habitus was not compatible with Mitch device.  While initially an ECMO activation from the field, ECMO unfortunately is not available.  He received multiple rounds of IV epinephrine.  At one point patient did have brief episode of fine V-fib and received 1 shock at 200 J as well as 300 mg amiodarone.  Repeated pulse checks continued to demonstrate asystole.  Labs demonstrate significantly elevated lactic acid, significant acidosis, and normal electrolytes/blood glucose.  Bedside ultrasound demonstrates cardiac standstill without other identified reversible causes of arrest.  After discussion with team, resuscitative efforts were discontinued and the patient was pronounced dead at 9:36 AM.  Following the patient's death his wife and parents did present to the emergency department; condolences extended.    Critical  care time; 35 minutes, time exclusive of procedures.    Disposition       Diagnosis     ICD-10-CM    1. Cardiopulmonary arrest (H)  I46.9            Discharge Medications   New Prescriptions    No medications on file         Scribe Disclosure:  Marta BARGER, am serving as a scribe at 9:27 AM on 2025 to document services personally performed by Edson Scales DO based on my observations and the provider's statements to me.        Edson Scales DO  25 [id number]

## (undated) RX ORDER — AMIODARONE HYDROCHLORIDE 150 MG/3ML
INJECTION, SOLUTION INTRAVENOUS
Status: DISPENSED

## (undated) RX ORDER — EPINEPHRINE IN SOD CHLOR,ISO 1 MG/10 ML
SYRINGE (ML) INTRAVENOUS
Status: DISPENSED